# Patient Record
Sex: FEMALE | Race: WHITE | Employment: OTHER | URBAN - METROPOLITAN AREA
[De-identification: names, ages, dates, MRNs, and addresses within clinical notes are randomized per-mention and may not be internally consistent; named-entity substitution may affect disease eponyms.]

---

## 2017-06-29 ENCOUNTER — HOSPITAL ENCOUNTER (OUTPATIENT)
Dept: VASCULAR LAB | Age: 67
Discharge: HOME | End: 2017-06-29
Attending: INTERNAL MEDICINE | Admitting: INTERNAL MEDICINE

## 2017-06-29 DIAGNOSIS — R60.9 EDEMA: ICD-10-CM

## 2017-07-17 ENCOUNTER — HOSPITAL ENCOUNTER (OUTPATIENT)
Dept: MRI IMAGING | Age: 67
Discharge: HOME | End: 2017-07-17
Attending: ORTHOPAEDIC SURGERY | Admitting: ORTHOPAEDIC SURGERY

## 2017-07-17 DIAGNOSIS — M23.204 DERANGEMENT OF MEDIAL MENISCUS OF LEFT KNEE DUE TO OLD INJURY: ICD-10-CM

## 2017-07-17 DIAGNOSIS — M25.562 LEFT KNEE PAIN, UNSPECIFIED CHRONICITY: ICD-10-CM

## 2020-04-07 ENCOUNTER — HOSPITAL ENCOUNTER (OUTPATIENT)
Dept: CT IMAGING | Age: 70
Discharge: HOME | End: 2020-04-07
Payer: MEDICARE

## 2020-04-07 PROCEDURE — 71270 CT THORAX DX C-/C+: CPT

## 2020-04-07 PROCEDURE — 6360000004 HC RX CONTRAST MEDICATION: Performed by: INTERNAL MEDICINE

## 2020-04-07 RX ADMIN — IOPAMIDOL 75 ML: 755 INJECTION, SOLUTION INTRAVENOUS at 14:51

## 2021-10-21 ENCOUNTER — OFFICE VISIT (OUTPATIENT)
Dept: ORTHOPEDIC SURGERY | Age: 71
End: 2021-10-21
Payer: MEDICARE

## 2021-10-21 VITALS — BODY MASS INDEX: 49.47 KG/M2 | WEIGHT: 262 LBS | HEIGHT: 61 IN

## 2021-10-21 DIAGNOSIS — M25.562 LEFT KNEE PAIN, UNSPECIFIED CHRONICITY: Primary | ICD-10-CM

## 2021-10-21 PROCEDURE — 4004F PT TOBACCO SCREEN RCVD TLK: CPT | Performed by: ORTHOPAEDIC SURGERY

## 2021-10-21 PROCEDURE — G8400 PT W/DXA NO RESULTS DOC: HCPCS | Performed by: ORTHOPAEDIC SURGERY

## 2021-10-21 PROCEDURE — 99204 OFFICE O/P NEW MOD 45 MIN: CPT | Performed by: ORTHOPAEDIC SURGERY

## 2021-10-21 PROCEDURE — 20610 DRAIN/INJ JOINT/BURSA W/O US: CPT | Performed by: ORTHOPAEDIC SURGERY

## 2021-10-21 PROCEDURE — G8484 FLU IMMUNIZE NO ADMIN: HCPCS | Performed by: ORTHOPAEDIC SURGERY

## 2021-10-21 PROCEDURE — G8427 DOCREV CUR MEDS BY ELIG CLIN: HCPCS | Performed by: ORTHOPAEDIC SURGERY

## 2021-10-21 PROCEDURE — 4040F PNEUMOC VAC/ADMIN/RCVD: CPT | Performed by: ORTHOPAEDIC SURGERY

## 2021-10-21 PROCEDURE — G8417 CALC BMI ABV UP PARAM F/U: HCPCS | Performed by: ORTHOPAEDIC SURGERY

## 2021-10-21 PROCEDURE — 1123F ACP DISCUSS/DSCN MKR DOCD: CPT | Performed by: ORTHOPAEDIC SURGERY

## 2021-10-21 PROCEDURE — 1090F PRES/ABSN URINE INCON ASSESS: CPT | Performed by: ORTHOPAEDIC SURGERY

## 2021-10-21 PROCEDURE — 3017F COLORECTAL CA SCREEN DOC REV: CPT | Performed by: ORTHOPAEDIC SURGERY

## 2021-10-21 RX ORDER — METHYLPREDNISOLONE ACETATE 40 MG/ML
40 INJECTION, SUSPENSION INTRA-ARTICULAR; INTRALESIONAL; INTRAMUSCULAR; SOFT TISSUE ONCE
Status: COMPLETED | OUTPATIENT
Start: 2021-10-21 | End: 2021-10-21

## 2021-10-21 RX ORDER — BUPIVACAINE HYDROCHLORIDE 5 MG/ML
4 INJECTION, SOLUTION PERINEURAL ONCE
Status: COMPLETED | OUTPATIENT
Start: 2021-10-21 | End: 2021-10-21

## 2021-10-21 RX ADMIN — BUPIVACAINE HYDROCHLORIDE 20 MG: 5 INJECTION, SOLUTION PERINEURAL at 11:18

## 2021-10-21 RX ADMIN — METHYLPREDNISOLONE ACETATE 40 MG: 40 INJECTION, SUSPENSION INTRA-ARTICULAR; INTRALESIONAL; INTRAMUSCULAR; SOFT TISSUE at 11:19

## 2021-10-25 NOTE — PROGRESS NOTES
10/21/2021     Reason for visit:  Left knee pain    History of Present Illness: The patient is a 71-year-old female who presents for evaluation of her left knee. She reports pain for several months with recent worsening on 10/8/2021. At the time she was coming down the stairs when she felt a popping sensation within the knee. Ever since then she has had worsening pain. She localized the pain diffusely throughout the knee including anterior and deep. The pain is made worse with standing, walking, stairs. Occasional swelling. Medical History:  Past Medical History:   Diagnosis Date    Asthma     Back pain     DJD    Bipolar 1 disorder     Diabetes mellitus     GERD (gastroesophageal reflux disease)     Hyperlipidemia     Hypertension     Tachycardia       Past Surgical History:   Procedure Laterality Date    APPENDECTOMY      CHOLECYSTECTOMY      ENDOMETRIAL ABLATION      LEG SURGERY      LEFT TIB FIB FX, SURGERY X2      No family history on file. Social History     Socioeconomic History    Marital status:      Spouse name: Not on file    Number of children: Not on file    Years of education: Not on file    Highest education level: Not on file   Occupational History    Not on file   Tobacco Use    Smoking status: Never Smoker    Tobacco comment: 1 CIG /DAY   Substance and Sexual Activity    Alcohol use: No    Drug use: No    Sexual activity: Not on file   Other Topics Concern    Not on file   Social History Narrative    Not on file     Social Determinants of Health     Financial Resource Strain:     Difficulty of Paying Living Expenses:    Food Insecurity:     Worried About Running Out of Food in the Last Year:     [address] in the Last Year:    Transportation Needs:     Lack of Transportation (Medical):      Lack of Transportation (Non-Medical):    Physical Activity:     Days of Exercise per Week:     Minutes of Exercise per Session:    Stress:     Feeling of Stress :    Social Connections:     Frequency of Communication with Friends and Family:     Frequency of Social Gatherings with Friends and Family:     Attends Zoroastrianism Services:     Active Member of Clubs or Organizations:     Attends Club or Organization Meetings:     Marital Status:    Intimate Partner Violence:     Fear of Current or Ex-Partner:     Emotionally Abused:     Physically Abused:     Sexually Abused:       Current Outpatient Medications on File Prior to Visit   Medication Sig Dispense Refill    naproxen (NAPROSYN) 500 MG tablet Take 1 tablet by mouth 2 times daily for 20 doses. 20 tablet 0    methocarbamol (ROBAXIN) 500 MG tablet Take 500 mg by mouth 4 times daily.  lisinopril (PRINIVIL;ZESTRIL) 10 MG tablet Take 10 mg by mouth daily.  omeprazole (PRILOSEC) 20 MG capsule Take 20 mg by mouth as needed.  niacin 500 MG CR capsule Take 500 mg by mouth nightly.  simvastatin (ZOCOR) 20 MG tablet Take 20 mg by mouth nightly.  ALPRAZolam (XANAX) 1 MG tablet Take 1 mg by mouth daily.  ARIPiprazole (ABILIFY) 10 MG tablet Take 10 mg by mouth daily.  montelukast (SINGULAIR) 10 MG tablet Take 10 mg by mouth nightly.  QUEtiapine (SEROQUEL) 100 MG tablet Take 100 mg by mouth daily.  albuterol (PROVENTIL HFA;VENTOLIN HFA) 108 (90 BASE) MCG/ACT inhaler Inhale 2 puffs into the lungs every 6 hours as needed.  QUEtiapine (SEROQUEL) 200 MG tablet Take 200 mg by mouth 2 times daily.  atenolol (TENORMIN) 50 MG tablet Take 50 mg by mouth See Admin Instructions. 1/2 TAB DAILY       glipiZIDE (GLUCOTROL) 5 MG CR tablet Take 5 mg by mouth daily.  ibuprofen (IBU) 600 MG tablet Take 1 tablet by mouth every 8 hours. 20 tablet 0     No current facility-administered medications on file prior to visit.       Allergies   Allergen Reactions    Penicillins Swelling        Review of Systems:  Constitutional: Patient is adequately groomed with no evidence of malnutrition  Mental Status: The patient is oriented to time, place and person. The patient's mood and affect are appropriate. Lymphatic: The lymphatic examination bilaterally reveals all areas to be without enlargement or induration. Vascular: Examination reveals no swelling or calf tenderness. Peripheral pulses are palpable and 2+. Neurological: The patient has good coordination. There is no weakness or sensory deficit. Skin:  Head/Neck: inspection reveals no rashes, ulcerations or lesions. Trunk: inspection reveals no rashes, ulcerations or lesions. Objective:  Ht 5' 1\" (1.549 m)   Wt 262 lb (118.8 kg)   BMI 49.50 kg/m²      Physical Exam:  The patient is well-appearing and in no apparent distress  Examination of the left knee   There is a small effusion, no gross deformity or skin changes  Range of motion reveals 0 to 130  Neg lachman, negative posterior drawer, no pain or laxity with varus or valgus stress at 0 degrees and 30 degrees of flexion  + joint line tenderness  5 out of 5 strength throughout distal muscle groups  Sensation is intact to light touch throughout all distributions  There is no calf swelling or tenderness  Palpable DP pulse, brisk cap refill, 2+ symmetric reflexes    Imagin view x-rays of the left knee were obtained the office today on 10/21/2021. Previous x-rays were reviewed as well. Mild decreased patellofemoral joint space is noted. Assessment:  Left knee pain. X-ray reveals patellofemoral degenerative joint disease. Possible additional meniscus tear    Plan:  I discussed with the patient the diagnosis and treatment options. We discussed operative and nonoperative management. At this point I do recommend nonoperative management. Nonoperative treatment options include activity modification, anti-inflammatory medications, physical therapy, and injections. The patient elected proceed with cortisone injection.   Therefore injection was given to to the left knee via sterile technique. The injection consisted of 40 mg of Depo-Medrol combined with 4 mL of 0.5% Marcaine. The patient tolerated this well. We will also start physical therapy. The patient will return as needed. If she does remain symptomatic we would obtain an MRI as a neck step. Greater than 45 minutes were spent with this encounter. Time spent included evaluating the patient's chart prior to arrival.  Evaluating the patient in the office including history, physical examination, imaging reviewing, and counseling on next steps. Lastly, time was spent discussing orders with my staff as well as providing documentation in the chart. Jose Constantino MD            Orthopaedic Surgery Sports Medicine and [address] and [address] [facility]            Team Physician [facility] ([place]      Disclaimer: This note was dictated with voice recognition software. Though review and correction are routine, we apologize for any errors.

## 2023-03-14 ENCOUNTER — TELEPHONE (OUTPATIENT)
Dept: CARDIOLOGY CLINIC | Age: 73
End: 2023-03-14

## 2023-03-14 ENCOUNTER — OFFICE VISIT (OUTPATIENT)
Dept: PULMONOLOGY | Age: 73
End: 2023-03-14
Payer: MEDICARE

## 2023-03-14 VITALS
BODY MASS INDEX: 55.32 KG/M2 | HEIGHT: 61 IN | SYSTOLIC BLOOD PRESSURE: 100 MMHG | DIASTOLIC BLOOD PRESSURE: 46 MMHG | OXYGEN SATURATION: 93 % | WEIGHT: 293 LBS

## 2023-03-14 DIAGNOSIS — I10 PRIMARY HYPERTENSION: ICD-10-CM

## 2023-03-14 DIAGNOSIS — G47.33 OSA (OBSTRUCTIVE SLEEP APNEA): ICD-10-CM

## 2023-03-14 DIAGNOSIS — R06.02 SHORTNESS OF BREATH: ICD-10-CM

## 2023-03-14 DIAGNOSIS — J45.20 MILD INTERMITTENT ASTHMA WITHOUT COMPLICATION: Primary | ICD-10-CM

## 2023-03-14 DIAGNOSIS — E66.01 CLASS 3 SEVERE OBESITY DUE TO EXCESS CALORIES WITH SERIOUS COMORBIDITY AND BODY MASS INDEX (BMI) OF 50.0 TO 59.9 IN ADULT (HCC): ICD-10-CM

## 2023-03-14 LAB
ANION GAP SERPL CALCULATED.3IONS-SCNC: 11 MMOL/L (ref 3–16)
BASOPHILS # BLD: 0 K/UL (ref 0–0.2)
BASOPHILS NFR BLD: 0.6 %
BUN SERPL-MCNC: 11 MG/DL (ref 7–20)
CALCIUM SERPL-MCNC: 9.8 MG/DL (ref 8.3–10.6)
CHLORIDE SERPL-SCNC: 99 MMOL/L (ref 99–110)
CO2 SERPL-SCNC: 32 MMOL/L (ref 21–32)
CREAT SERPL-MCNC: 0.6 MG/DL (ref 0.6–1.2)
DEPRECATED RDW RBC AUTO: 14.3 % (ref 12.4–15.4)
EOSINOPHIL # BLD: 0.2 K/UL (ref 0–0.6)
EOSINOPHIL NFR BLD: 4 %
GFR SERPLBLD CREATININE-BSD FMLA CKD-EPI: >60 ML/MIN/{1.73_M2}
GLUCOSE SERPL-MCNC: 123 MG/DL (ref 70–99)
HCT VFR BLD AUTO: 35.4 % (ref 36–48)
HGB BLD-MCNC: 11.4 G/DL (ref 12–16)
LYMPHOCYTES # BLD: 0.8 K/UL (ref 1–5.1)
LYMPHOCYTES NFR BLD: 19.8 %
MCH RBC QN AUTO: 30.4 PG (ref 26–34)
MCHC RBC AUTO-ENTMCNC: 32.2 G/DL (ref 31–36)
MCV RBC AUTO: 94.5 FL (ref 80–100)
MONOCYTES # BLD: 0.3 K/UL (ref 0–1.3)
MONOCYTES NFR BLD: 8 %
NEUTROPHILS # BLD: 2.9 K/UL (ref 1.7–7.7)
NEUTROPHILS NFR BLD: 67.6 %
NT-PROBNP SERPL-MCNC: 452 PG/ML (ref 0–124)
PATH INTERP BLD-IMP: NO
PLATELET # BLD AUTO: 98 K/UL (ref 135–450)
PLATELET BLD QL SMEAR: ABNORMAL
PMV BLD AUTO: 10.3 FL (ref 5–10.5)
POTASSIUM SERPL-SCNC: 4.7 MMOL/L (ref 3.5–5.1)
RBC # BLD AUTO: 3.74 M/UL (ref 4–5.2)
SLIDE REVIEW: ABNORMAL
SODIUM SERPL-SCNC: 142 MMOL/L (ref 136–145)
WBC # BLD AUTO: 4.3 K/UL (ref 4–11)

## 2023-03-14 PROCEDURE — 1090F PRES/ABSN URINE INCON ASSESS: CPT | Performed by: INTERNAL MEDICINE

## 2023-03-14 PROCEDURE — 3017F COLORECTAL CA SCREEN DOC REV: CPT | Performed by: INTERNAL MEDICINE

## 2023-03-14 PROCEDURE — G8427 DOCREV CUR MEDS BY ELIG CLIN: HCPCS | Performed by: INTERNAL MEDICINE

## 2023-03-14 PROCEDURE — G8400 PT W/DXA NO RESULTS DOC: HCPCS | Performed by: INTERNAL MEDICINE

## 2023-03-14 PROCEDURE — 1036F TOBACCO NON-USER: CPT | Performed by: INTERNAL MEDICINE

## 2023-03-14 PROCEDURE — G8419 CALC BMI OUT NRM PARAM NOF/U: HCPCS | Performed by: INTERNAL MEDICINE

## 2023-03-14 PROCEDURE — 3078F DIAST BP <80 MM HG: CPT | Performed by: INTERNAL MEDICINE

## 2023-03-14 PROCEDURE — 94664 DEMO&/EVAL PT USE INHALER: CPT | Performed by: INTERNAL MEDICINE

## 2023-03-14 PROCEDURE — 99205 OFFICE O/P NEW HI 60 MIN: CPT | Performed by: INTERNAL MEDICINE

## 2023-03-14 PROCEDURE — 3074F SYST BP LT 130 MM HG: CPT | Performed by: INTERNAL MEDICINE

## 2023-03-14 PROCEDURE — 1123F ACP DISCUSS/DSCN MKR DOCD: CPT | Performed by: INTERNAL MEDICINE

## 2023-03-14 PROCEDURE — G8484 FLU IMMUNIZE NO ADMIN: HCPCS | Performed by: INTERNAL MEDICINE

## 2023-03-14 RX ORDER — UBIDECARENONE 75 MG
50 CAPSULE ORAL DAILY
COMMUNITY

## 2023-03-14 RX ORDER — DULAGLUTIDE 1.5 MG/.5ML
INJECTION, SOLUTION SUBCUTANEOUS
COMMUNITY
Start: 2023-01-13

## 2023-03-14 RX ORDER — INSULIN DETEMIR 100 [IU]/ML
INJECTION, SOLUTION SUBCUTANEOUS
COMMUNITY
Start: 2023-02-09

## 2023-03-14 RX ORDER — FERROUS SULFATE 325(65) MG
325 TABLET ORAL
COMMUNITY

## 2023-03-14 NOTE — TELEPHONE ENCOUNTER
Patient was referred by Keith Salamanca  to the [facility] location with Dr. Isiah Lindquist . Patient has been scheduled for 4/28/23 at 9 am (am/pm). Patient verbalized understanding of appointment instructions. Call complete.

## 2023-03-14 NOTE — PROGRESS NOTES
PULMONARY OFFICE NEW PATIENT VISIT    CONSULTING PHYSICIAN:  Adam Cobos MD     REASON FOR VISIT:   Chief Complaint   Patient presents with    New Patient     Ref: Dr. Golden Sapp of Breath     Uses 2 L/ O2        DATE OF VISIT: 3/14/2023    HISTORY OF PRESENT ILLNESS: 81y.o. year old female comes into the pulmonary office for the first time. Patient reports that for the last 2 years she has been having progressively worsening shortness of breath mostly with exertion. For the last 3 weeks this has been worse. She gets short of breath with minimal exertion does occasionally wheeze. Denies any significant cough. Intermittently does have dry cough. No discolored expectoration. She has been on oxygen for the last 3 to 4 weeks. Uses it at 2 L/min all the time. Even with oxygen supplementation on a 2 L/min with exertion she does desaturate. Quickly recovers on resting. Off of oxygen her oxygen levels dropped down as low as in mid 70s. She has been gaining weight and has bilateral pedal edema. Also reports orthopnea. At nighttime her sleep remains disturbed with shallow breathing, snoring. Feels exhausted and tired throughout the day. Has been diagnosed with asthma since her childhood. Also smoked cigarettes for 20 years before quitting 30 years ago. Used to smoke about 1 pack/day. Also was exposed to secondhand smoking through her family. She was given Spiriva in the past but has not used it recently. Only uses albuterol as needed. Denies any fevers or night sweats. Does have palpitations, presyncope. She does take Lasix 20 mg p.o. daily but this has been ineffective in reducing her weight. Also has a history of breast cancer and did undergo chemotherapy in the past.  Suffered with subsegmental PE in 2018 and was on Xarelto for 2 years after that. Recent testing for lower extremities as well as CT angiogram have not revealed any recurrence of PEs.       REVIEW OF SYSTEMS: CONSTITUTIONAL SYMPTOMS: The patient denies fever and night sweats. Reports of daytime fatigue and recent weight gain. HEENT: No vision changes. No tinnitus, Denies sinus pain. No hoarseness, or dysphagia. NECK: Patient denies swelling in the neck. CARDIOVASCULAR: Denies chest pain, palpitation, syncope. RESPIRATORY: As per HPI. GASTROINTESTINAL: Denies nausea, abdominal pain or change in bowel function. GENITOURINARY: Denies obstructive symptoms. No history of incontinence. BREASTS: No masses or lumps in the breasts. SKIN: No rashes or itching. MUSCULOSKELETAL: Denies weakness or bone pain. Does have bilateral pedal edema. NEUROLOGICAL: No headaches or seizures. PSYCHIATRIC: Denies mood swings or depression. ENDOCRINE: Denies heat or cold intolerance or excessive thirst.  HEMATOLOGIC/LYMPHATIC: Denies easy bruising or lymph node swelling. ALLERGIC/IMMUNOLOGIC: No environmental allergies. PAST MEDICAL HISTORY:   Past Medical History:   Diagnosis Date    Asthma     Back pain     DJD    Bipolar 1 disorder (HCC)     Diabetes mellitus (HCC)     GERD (gastroesophageal reflux disease)     Hyperlipidemia     Hypertension     Tachycardia        PAST SURGICAL HISTORY:   Past Surgical History:   Procedure Laterality Date    APPENDECTOMY      CHOLECYSTECTOMY      ENDOMETRIAL ABLATION      LEG SURGERY      LEFT TIB FIB FX, SURGERY X2       SOCIAL HISTORY:   Social History     Tobacco Use    Smoking status: Never    Smokeless tobacco: Never    Tobacco comments:     1 CIG /DAY   Substance Use Topics    Alcohol use: No    Drug use: No       FAMILY HISTORY: No family history on file.     MEDICATIONS:     Current Outpatient Medications on File Prior to Visit   Medication Sig Dispense Refill    metFORMIN (GLUCOPHAGE) 500 MG tablet Take 1,000 mg by mouth daily (with breakfast)      ferrous sulfate (IRON 325) 325 (65 Fe) MG tablet Take 325 mg by mouth daily (with breakfast)      vitamin B-12 (CYANOCOBALAMIN) 100 MCG tablet Take 50 mcg by mouth daily      Furosemide (LASIX PO) Take by mouth daily      TRULICITY 1.5 DZ/5.9GK SC injection       LEVEMIR FLEXTOUCH 100 UNIT/ML injection pen       lisinopril (PRINIVIL;ZESTRIL) 10 MG tablet Take 30 mg by mouth daily      niacin 500 MG CR capsule Take 500 mg by mouth nightly. simvastatin (ZOCOR) 20 MG tablet Take 20 mg by mouth nightly. ARIPiprazole (ABILIFY) 10 MG tablet Take 10 mg by mouth daily. montelukast (SINGULAIR) 10 MG tablet Take 10 mg by mouth nightly. QUEtiapine (SEROQUEL) 100 MG tablet Take 100 mg by mouth daily. albuterol (PROVENTIL HFA;VENTOLIN HFA) 108 (90 BASE) MCG/ACT inhaler Inhale 2 puffs into the lungs every 6 hours as needed. QUEtiapine (SEROQUEL) 200 MG tablet Take 200 mg by mouth at bedtime      atenolol (TENORMIN) 50 MG tablet Take 50 mg by mouth See Admin Instructions. 1/2 TAB DAILY       glipiZIDE (GLUCOTROL) 5 MG CR tablet Take 5 mg by mouth daily. naproxen (NAPROSYN) 500 MG tablet Take 1 tablet by mouth 2 times daily for 20 doses. 20 tablet 0     No current facility-administered medications on file prior to visit. ALLERGIES:   Allergies as of 03/14/2023 - Fully Reviewed 03/14/2023   Allergen Reaction Noted    Penicillins Swelling 12/01/2012      OBJECTIVE:   height is 5' 1\" (1.549 m) and weight is 305 lb (138.3 kg) (abnormal). Her blood pressure is 100/46 (abnormal). Her oxygen saturation is 93%. PHYSICAL EXAM:    CONSTITUTIONAL: She is a 81y.o.-year-old who appears her stated age. She is alert and oriented x 3 and in no acute distress. HEENT: PERRLA, EOMI. No scleral icterus. No thrush, atraumatic, normocephalic. NECK: Supple, without cervical or supraclavicular lymphadenopathy:  CARDIOVASCULAR: S1 S2 RRR. Without murmer  RESPIRATORY & CHEST: Bibasilar decreased breath sounds. No wheezing or crackles heard.   GASTROINTESTINAL & ABDOMEN: Soft, nontender, positive bowel sounds in all quadrants, non-distended, without hepatosplenomegaly. GENITOURINARY: Deferred. MUSCULOSKELETAL: No tenderness to palpation of the axial skeleton. There is no clubbing. No cyanosis. 1+ pitting edema in bilateral lower extremities. SKIN OF BODY: No rash or jaundice. PSYCHIATRIC EVALUATION: Normal affect. Patient answers questions appropriately. HEMATOLOGIC/LYMPHATIC/ IMMUNOLOGIC: No palpable lymphadenopathy. NEUROLOGIC: Alert and oriented x 3. Groslly non-focal. Motor strength is 5+/5 in all muscle groups. The patient has a normal sensorium globally. LABS:    No flowsheet data found. IMAGING:    CT angiogram of the chest done on 12/28/2022 at [facility]:   PULMONARY ARTERIES:  Enhance normally without filling defect or other evidence of pulmonary embolism   LUNGS/AIRWAYS:  Mild basilar atelectatic changes left greater than right   PLEURA: Unremarkable. No pleural effusion or pneumothorax   MEDIASTINUM/SONU:  Unremarkable   HEART/PERICARDIUM:  Unremarkable   VESSELS:  Unremarkable. No aneurysm   CHEST WALL/LOWER NECK:  Unremarkable   UPPER ABDOMEN:  Unremarkable   BONES:  Chronic degenerative changes in the spine   OTHER:  None       IMPRESSION       No significant abnormality. No evidence of pulmonary embolus      Pulmonary Functions Testing Results:      Transthoracic echocardiogram with Doppler done on 11/15/2022 at [facility]    Summary:   Left ventricular function is normal.   Overall left ventricular ejection fraction is estimated to be 55-60%. Left ventricular wall motion is normal.   Left atrial size is at the upper limits of normal.   The Aortic Valve is mildly calcified. No hemodynamically significant valvular aortic stenosis. Borderline dilated ascending aorta. Right ventricular systolic pressure is normal at <35 mmHg. The aortic valve is trileaflet. Left atrial filling pressure is elevated based on E/E >15.    A contrast agent was used to demonstrate all left ventricular wall segments. IMPRESSION AND RECOMMENDATIONS:     1. Mild intermittent asthma without complication  -Patient has been diagnosed to have asthma since her childhood.  -She also smoked for 20 years in the past.  -She may have developed a COPD-asthma overlap.  -I will get a complete PFT in order to assess the level of obstructive airway disease present currently. -In the meanwhile I will represcribe her with Spiriva Respimat 2.5 mcg 2 puff daily.  -Patient was instructed in the use of Respimat demonstration Device. Patient understood instructions and is aware to call the office if they have any problems or questions. Time spent was 5 mins.  -She will continue to use albuterol as needed. - Full PFT Study With Bronchodilator; Future  -I will also get a daytime ABG done in order to rule out hypercapnia. 2. Class 3 severe obesity due to excess calories with serious comorbidity and body mass index (BMI) of 50.0 to 59.9 in [facility])  -I discussed weight loss as a treatment strategy in achieving the healthcare goals. If patient loses even 10 to 15% of current body weight, it will be beneficial in improving the overall health. 3. Primary hypertension  -Currently on atenolol, lisinopril  -Refractory hypertension could be leading to heart failure with preserved ejection fraction.  -Uncontrolled hypertension could be a sign of untreated sleep apnea. 4. ALEXANDRU (obstructive sleep apnea)  -Patient does shallow breathing at nighttime and does have daytime sleepiness.  -I also suspect that she may have obesity hypoventilation syndrome.  -I will get a split-night sleep study done in order to investigate this further.  - Sleep Study with PAP Titration; Future    5. Shortness of breath  -Patient has dyspnea on exertion, fluid retention, palpitation and presyncope. -She has a history of PE in the past.  Recent echocardiogram has not shown elevated right ventricle systolic pressure. (Echo windows were poor)  -I wonder if she has developed heart failure with preserved action fraction or chronic thromboembolic pulmonary hypertension? .  -She may need a repeat echocardiogram versus right heart catheterization  -I will send her to Dr. Tabby Rico (cardiology) for evaluation  - Full PFT Study With Bronchodilator; Future  - Brain natriuretic peptide; Future  - CBC with Auto Differential; Future  - Basic Metabolic Panel; Future  - Ambulatory referral to Cardiology  - BLOOD GAS, ARTERIAL; Future      Thank you Dr. Devin Gilman for letting me take care of this very pleasant patient. I spent 60 minutes of face to face time with the patient with greater than 50% spent on counseling about  the clinical diagnosis, treatment plan and prognosis. Return in about 1 month (around 4/14/2023). Torey Crooks MD  Pulmonary Critical Care and Sleep Medicine  3/14/2023, 12:16 PM    This note was completed using dragon medical speech recognition software. Grammatical errors, random word insertions, pronoun errors and incomplete sentences are occasional consequences of this technology due to software limitations. If there are questions or concerns about the content of this note of information contained within the body of this dictation they should be addressed with the provider for clarification.

## 2023-03-14 NOTE — TELEPHONE ENCOUNTER
Referral received from Dr Anabel Myles for patient to see Dr Samantha Meza for SOB and CHF. I called and LMOM to return the call to schedule an appt.

## 2023-03-15 ENCOUNTER — TELEPHONE (OUTPATIENT)
Dept: SLEEP CENTER | Age: 73
End: 2023-03-15

## 2023-03-15 NOTE — TELEPHONE ENCOUNTER
Called to schedule split night per Jeffrey Kohli with pt -she wants me to call back in May   Is not ready to schedule this appt just yet. Has a lot of other appts -wanted to hold off.      [employer] medicare insurance

## 2023-03-17 ENCOUNTER — HOSPITAL ENCOUNTER (OUTPATIENT)
Dept: PULMONOLOGY | Age: 73
Discharge: HOME | End: 2023-03-17
Payer: MEDICARE

## 2023-03-17 VITALS — OXYGEN SATURATION: 93 % | RESPIRATION RATE: 16 BRPM | HEART RATE: 75 BPM

## 2023-03-17 DIAGNOSIS — J45.20 MILD INTERMITTENT ASTHMA WITHOUT COMPLICATION: ICD-10-CM

## 2023-03-17 DIAGNOSIS — R06.02 SHORTNESS OF BREATH: ICD-10-CM

## 2023-03-17 LAB
DLCO %PRED: 35 %
DLCO PRED: NORMAL
DLCO/VA %PRED: NORMAL
DLCO/VA PRED: NORMAL
DLCO/VA: NORMAL
DLCO: NORMAL
EXPIRATORY TIME-POST: NORMAL
EXPIRATORY TIME: NORMAL
FEF 25-75% %CHNG: NORMAL
FEF 25-75% %PRED-POST: NORMAL
FEF 25-75% %PRED-PRE: NORMAL
FEF 25-75% PRED: NORMAL
FEF 25-75%-POST: NORMAL
FEF 25-75%-PRE: NORMAL
FEV1 %PRED-POST: 50 %
FEV1 %PRED-PRE: 49 %
FEV1 PRED: NORMAL
FEV1-POST: NORMAL
FEV1-PRE: NORMAL
FEV1/FVC %PRED-POST: NORMAL
FEV1/FVC %PRED-PRE: NORMAL
FEV1/FVC PRED: NORMAL
FEV1/FVC-POST: 103 %
FEV1/FVC-PRE: 102 %
FVC %PRED-POST: NORMAL
FVC %PRED-PRE: NORMAL
FVC PRED: NORMAL
FVC-POST: NORMAL
FVC-PRE: NORMAL
GAW %PRED: NORMAL
GAW PRED: NORMAL
GAW: NORMAL
IC %PRED: NORMAL
IC PRED: NORMAL
IC: NORMAL
MEP: NORMAL
MIP: NORMAL
MVV %PRED-PRE: NORMAL
MVV PRED: NORMAL
MVV-PRE: NORMAL
PEF %PRED-POST: NORMAL
PEF %PRED-PRE: NORMAL
PEF PRED: NORMAL
PEF%CHNG: NORMAL
PEF-POST: NORMAL
PEF-PRE: NORMAL
RAW %PRED: NORMAL
RAW PRED: NORMAL
RAW: NORMAL
RV %PRED: NORMAL
RV PRED: NORMAL
RV: NORMAL
SVC %PRED: NORMAL
SVC PRED: NORMAL
SVC: NORMAL
TLC %PRED: 66 %
TLC PRED: NORMAL
TLC: NORMAL
VA %PRED: NORMAL
VA PRED: NORMAL
VA: NORMAL
VTG %PRED: NORMAL
VTG PRED: NORMAL
VTG: NORMAL

## 2023-03-17 PROCEDURE — 94726 PLETHYSMOGRAPHY LUNG VOLUMES: CPT

## 2023-03-17 PROCEDURE — 94729 DIFFUSING CAPACITY: CPT

## 2023-03-17 PROCEDURE — 94760 N-INVAS EAR/PLS OXIMETRY 1: CPT

## 2023-03-17 PROCEDURE — 6370000000 HC RX 637 (ALT 250 FOR IP): Performed by: INTERNAL MEDICINE

## 2023-03-17 PROCEDURE — 94060 EVALUATION OF WHEEZING: CPT

## 2023-03-17 RX ORDER — ALBUTEROL SULFATE 90 UG/1
4 AEROSOL, METERED RESPIRATORY (INHALATION) ONCE
Status: CANCELLED | OUTPATIENT
Start: 2023-03-17

## 2023-03-17 RX ORDER — ALBUTEROL SULFATE 90 UG/1
4 AEROSOL, METERED RESPIRATORY (INHALATION) ONCE
Status: COMPLETED | OUTPATIENT
Start: 2023-03-17 | End: 2023-03-17

## 2023-03-17 RX ADMIN — Medication 4 PUFF: at 14:17

## 2023-03-17 ASSESSMENT — PULMONARY FUNCTION TESTS
FEV1/FVC_POST: 103
FEV1_PERCENT_PREDICTED_PRE: 49
FEV1/FVC_PRE: 102
FEV1_PERCENT_PREDICTED_POST: 50

## 2023-03-20 ENCOUNTER — HOSPITAL ENCOUNTER (INPATIENT)
Age: 73
LOS: 7 days | Discharge: HOME | End: 2023-03-27
Attending: EMERGENCY MEDICINE | Admitting: INTERNAL MEDICINE
Payer: MEDICARE

## 2023-03-20 ENCOUNTER — APPOINTMENT (OUTPATIENT)
Dept: CT IMAGING | Age: 73
End: 2023-03-20
Payer: MEDICARE

## 2023-03-20 ENCOUNTER — APPOINTMENT (OUTPATIENT)
Dept: GENERAL RADIOLOGY | Age: 73
End: 2023-03-20
Payer: MEDICARE

## 2023-03-20 DIAGNOSIS — I50.9 ACUTE ON CHRONIC CONGESTIVE HEART FAILURE, UNSPECIFIED HEART FAILURE TYPE (HCC): Primary | ICD-10-CM

## 2023-03-20 PROBLEM — I50.43 CHF (CONGESTIVE HEART FAILURE), NYHA CLASS I, ACUTE ON CHRONIC, COMBINED (HCC): Status: ACTIVE | Noted: 2023-03-20

## 2023-03-20 LAB
ALBUMIN SERPL-MCNC: 3.9 G/DL (ref 3.4–5)
ALBUMIN/GLOB SERPL: 1.3 {RATIO} (ref 1.1–2.2)
ALP SERPL-CCNC: 72 U/L (ref 40–129)
ALT SERPL-CCNC: 11 U/L (ref 10–40)
ANION GAP SERPL CALCULATED.3IONS-SCNC: 10 MMOL/L (ref 3–16)
AST SERPL-CCNC: 19 U/L (ref 15–37)
BASOPHILS # BLD: 0 K/UL (ref 0–0.2)
BASOPHILS NFR BLD: 0.5 %
BILIRUB SERPL-MCNC: 0.3 MG/DL (ref 0–1)
BUN SERPL-MCNC: 11 MG/DL (ref 7–20)
CALCIUM SERPL-MCNC: 9.3 MG/DL (ref 8.3–10.6)
CHLORIDE SERPL-SCNC: 97 MMOL/L (ref 99–110)
CO2 SERPL-SCNC: 35 MMOL/L (ref 21–32)
CREAT SERPL-MCNC: 0.5 MG/DL (ref 0.6–1.2)
D DIMER: 0.8 UG/ML FEU (ref 0–0.6)
DEPRECATED RDW RBC AUTO: 14.5 % (ref 12.4–15.4)
EKG ATRIAL RATE: 80 BPM
EKG DIAGNOSIS: NORMAL
EKG P AXIS: 44 DEGREES
EKG P-R INTERVAL: 132 MS
EKG Q-T INTERVAL: 354 MS
EKG QRS DURATION: 76 MS
EKG QTC CALCULATION (BAZETT): 408 MS
EKG R AXIS: 3 DEGREES
EKG T AXIS: 58 DEGREES
EKG VENTRICULAR RATE: 80 BPM
EOSINOPHIL # BLD: 0.1 K/UL (ref 0–0.6)
EOSINOPHIL NFR BLD: 4.2 %
FOLATE SERPL-MCNC: 14.91 NG/ML (ref 4.78–24.2)
GFR SERPLBLD CREATININE-BSD FMLA CKD-EPI: >60 ML/MIN/{1.73_M2}
GLUCOSE BLD-MCNC: 135 MG/DL (ref 70–99)
GLUCOSE BLD-MCNC: 135 MG/DL (ref 70–99)
GLUCOSE BLD-MCNC: 140 MG/DL (ref 70–99)
GLUCOSE SERPL-MCNC: 117 MG/DL (ref 70–99)
HCT VFR BLD AUTO: 32.8 % (ref 36–48)
HGB BLD-MCNC: 10.2 G/DL (ref 12–16)
IRON SATN MFR SERPL: 11 % (ref 15–50)
IRON SERPL-MCNC: 42 UG/DL (ref 37–145)
LYMPHOCYTES # BLD: 0.8 K/UL (ref 1–5.1)
LYMPHOCYTES NFR BLD: 32.1 %
MAGNESIUM SERPL-MCNC: 1.8 MG/DL (ref 1.8–2.4)
MCH RBC QN AUTO: 29.7 PG (ref 26–34)
MCHC RBC AUTO-ENTMCNC: 31.2 G/DL (ref 31–36)
MCV RBC AUTO: 95.2 FL (ref 80–100)
MONOCYTES # BLD: 0.2 K/UL (ref 0–1.3)
MONOCYTES NFR BLD: 9.3 %
NEUTROPHILS # BLD: 1.3 K/UL (ref 1.7–7.7)
NEUTROPHILS NFR BLD: 53.9 %
NT-PROBNP SERPL-MCNC: 416 PG/ML (ref 0–124)
PERFORMED ON: ABNORMAL
PLATELET # BLD AUTO: 86 K/UL (ref 135–450)
PMV BLD AUTO: 9.1 FL (ref 5–10.5)
POTASSIUM SERPL-SCNC: 3.9 MMOL/L (ref 3.5–5.1)
PROT SERPL-MCNC: 7 G/DL (ref 6.4–8.2)
RBC # BLD AUTO: 3.44 M/UL (ref 4–5.2)
SODIUM SERPL-SCNC: 142 MMOL/L (ref 136–145)
TIBC SERPL-MCNC: 367 UG/DL (ref 260–445)
TROPONIN T SERPL-MCNC: <0.01 NG/ML
TSH SERPL DL<=0.005 MIU/L-ACNC: 2.29 UIU/ML (ref 0.27–4.2)
VIT B12 SERPL-MCNC: 539 PG/ML (ref 211–911)
WBC # BLD AUTO: 2.4 K/UL (ref 4–11)

## 2023-03-20 PROCEDURE — 2700000000 HC OXYGEN THERAPY PER DAY

## 2023-03-20 PROCEDURE — 83735 ASSAY OF MAGNESIUM: CPT

## 2023-03-20 PROCEDURE — 96374 THER/PROPH/DIAG INJ IV PUSH: CPT

## 2023-03-20 PROCEDURE — 99285 EMERGENCY DEPT VISIT HI MDM: CPT

## 2023-03-20 PROCEDURE — 82746 ASSAY OF FOLIC ACID SERUM: CPT

## 2023-03-20 PROCEDURE — 6360000002 HC RX W HCPCS: Performed by: INTERNAL MEDICINE

## 2023-03-20 PROCEDURE — 84443 ASSAY THYROID STIM HORMONE: CPT

## 2023-03-20 PROCEDURE — 71260 CT THORAX DX C+: CPT | Performed by: INTERNAL MEDICINE

## 2023-03-20 PROCEDURE — 94760 N-INVAS EAR/PLS OXIMETRY 1: CPT

## 2023-03-20 PROCEDURE — 80053 COMPREHEN METABOLIC PANEL: CPT

## 2023-03-20 PROCEDURE — 94640 AIRWAY INHALATION TREATMENT: CPT

## 2023-03-20 PROCEDURE — 85025 COMPLETE CBC W/AUTO DIFF WBC: CPT

## 2023-03-20 PROCEDURE — 82607 VITAMIN B-12: CPT

## 2023-03-20 PROCEDURE — 93005 ELECTROCARDIOGRAM TRACING: CPT | Performed by: EMERGENCY MEDICINE

## 2023-03-20 PROCEDURE — 83880 ASSAY OF NATRIURETIC PEPTIDE: CPT

## 2023-03-20 PROCEDURE — 6370000000 HC RX 637 (ALT 250 FOR IP): Performed by: INTERNAL MEDICINE

## 2023-03-20 PROCEDURE — 84484 ASSAY OF TROPONIN QUANT: CPT

## 2023-03-20 PROCEDURE — 6360000002 HC RX W HCPCS: Performed by: EMERGENCY MEDICINE

## 2023-03-20 PROCEDURE — 2580000003 HC RX 258: Performed by: INTERNAL MEDICINE

## 2023-03-20 PROCEDURE — 36415 COLL VENOUS BLD VENIPUNCTURE: CPT

## 2023-03-20 PROCEDURE — 1200000000 HC SEMI PRIVATE

## 2023-03-20 PROCEDURE — 93010 ELECTROCARDIOGRAM REPORT: CPT | Performed by: INTERNAL MEDICINE

## 2023-03-20 PROCEDURE — 0202U NFCT DS 22 TRGT SARS-COV-2: CPT

## 2023-03-20 PROCEDURE — 85379 FIBRIN DEGRADATION QUANT: CPT

## 2023-03-20 PROCEDURE — 83540 ASSAY OF IRON: CPT

## 2023-03-20 PROCEDURE — 83036 HEMOGLOBIN GLYCOSYLATED A1C: CPT

## 2023-03-20 PROCEDURE — 6360000004 HC RX CONTRAST MEDICATION: Performed by: INTERNAL MEDICINE

## 2023-03-20 PROCEDURE — 83550 IRON BINDING TEST: CPT

## 2023-03-20 PROCEDURE — 71045 X-RAY EXAM CHEST 1 VIEW: CPT

## 2023-03-20 RX ORDER — INSULIN GLARGINE 100 [IU]/ML
10 INJECTION, SOLUTION SUBCUTANEOUS NIGHTLY
Status: DISCONTINUED | OUTPATIENT
Start: 2023-03-20 | End: 2023-03-27 | Stop reason: HOSPADM

## 2023-03-20 RX ORDER — POLYETHYLENE GLYCOL 3350 17 G/17G
17 POWDER, FOR SOLUTION ORAL DAILY PRN
Status: DISCONTINUED | OUTPATIENT
Start: 2023-03-20 | End: 2023-03-27 | Stop reason: HOSPADM

## 2023-03-20 RX ORDER — SODIUM CHLORIDE 0.9 % (FLUSH) 0.9 %
5-40 SYRINGE (ML) INJECTION EVERY 12 HOURS SCHEDULED
Status: DISCONTINUED | OUTPATIENT
Start: 2023-03-20 | End: 2023-03-27 | Stop reason: HOSPADM

## 2023-03-20 RX ORDER — FERROUS SULFATE 325(65) MG
325 TABLET ORAL
Status: DISCONTINUED | OUTPATIENT
Start: 2023-03-21 | End: 2023-03-27 | Stop reason: HOSPADM

## 2023-03-20 RX ORDER — FUROSEMIDE 10 MG/ML
40 INJECTION INTRAMUSCULAR; INTRAVENOUS 2 TIMES DAILY
Status: DISCONTINUED | OUTPATIENT
Start: 2023-03-20 | End: 2023-03-20

## 2023-03-20 RX ORDER — INSULIN LISPRO 100 [IU]/ML
0-4 INJECTION, SOLUTION INTRAVENOUS; SUBCUTANEOUS
Status: DISCONTINUED | OUTPATIENT
Start: 2023-03-20 | End: 2023-03-27 | Stop reason: HOSPADM

## 2023-03-20 RX ORDER — SODIUM CHLORIDE 0.9 % (FLUSH) 0.9 %
5-40 SYRINGE (ML) INJECTION PRN
Status: DISCONTINUED | OUTPATIENT
Start: 2023-03-20 | End: 2023-03-27 | Stop reason: HOSPADM

## 2023-03-20 RX ORDER — SODIUM CHLORIDE 9 MG/ML
INJECTION, SOLUTION INTRAVENOUS PRN
Status: DISCONTINUED | OUTPATIENT
Start: 2023-03-20 | End: 2023-03-27 | Stop reason: HOSPADM

## 2023-03-20 RX ORDER — ACETAMINOPHEN 650 MG/1
650 SUPPOSITORY RECTAL EVERY 6 HOURS PRN
Status: DISCONTINUED | OUTPATIENT
Start: 2023-03-20 | End: 2023-03-27 | Stop reason: HOSPADM

## 2023-03-20 RX ORDER — QUETIAPINE FUMARATE 200 MG/1
200 TABLET, FILM COATED ORAL NIGHTLY
Status: DISCONTINUED | OUTPATIENT
Start: 2023-03-20 | End: 2023-03-27 | Stop reason: HOSPADM

## 2023-03-20 RX ORDER — ENOXAPARIN SODIUM 100 MG/ML
30 INJECTION SUBCUTANEOUS 2 TIMES DAILY
Status: DISCONTINUED | OUTPATIENT
Start: 2023-03-20 | End: 2023-03-27 | Stop reason: HOSPADM

## 2023-03-20 RX ORDER — DEXTROSE MONOHYDRATE 100 MG/ML
INJECTION, SOLUTION INTRAVENOUS CONTINUOUS PRN
Status: DISCONTINUED | OUTPATIENT
Start: 2023-03-20 | End: 2023-03-27 | Stop reason: HOSPADM

## 2023-03-20 RX ORDER — ONDANSETRON 2 MG/ML
4 INJECTION INTRAMUSCULAR; INTRAVENOUS EVERY 6 HOURS PRN
Status: DISCONTINUED | OUTPATIENT
Start: 2023-03-20 | End: 2023-03-27 | Stop reason: HOSPADM

## 2023-03-20 RX ORDER — ATORVASTATIN CALCIUM 10 MG/1
10 TABLET, FILM COATED ORAL NIGHTLY
Status: DISCONTINUED | OUTPATIENT
Start: 2023-03-20 | End: 2023-03-27 | Stop reason: HOSPADM

## 2023-03-20 RX ORDER — ACETAMINOPHEN 325 MG/1
650 TABLET ORAL EVERY 6 HOURS PRN
Status: DISCONTINUED | OUTPATIENT
Start: 2023-03-20 | End: 2023-03-27 | Stop reason: HOSPADM

## 2023-03-20 RX ORDER — INSULIN LISPRO 100 [IU]/ML
0-4 INJECTION, SOLUTION INTRAVENOUS; SUBCUTANEOUS NIGHTLY
Status: DISCONTINUED | OUTPATIENT
Start: 2023-03-20 | End: 2023-03-27 | Stop reason: HOSPADM

## 2023-03-20 RX ORDER — FUROSEMIDE 10 MG/ML
20 INJECTION INTRAMUSCULAR; INTRAVENOUS ONCE
Status: COMPLETED | OUTPATIENT
Start: 2023-03-20 | End: 2023-03-20

## 2023-03-20 RX ORDER — MAGNESIUM SULFATE IN WATER 40 MG/ML
2000 INJECTION, SOLUTION INTRAVENOUS PRN
Status: DISCONTINUED | OUTPATIENT
Start: 2023-03-20 | End: 2023-03-27 | Stop reason: HOSPADM

## 2023-03-20 RX ORDER — QUETIAPINE FUMARATE 100 MG/1
100 TABLET, FILM COATED ORAL DAILY
Status: DISCONTINUED | OUTPATIENT
Start: 2023-03-20 | End: 2023-03-27 | Stop reason: HOSPADM

## 2023-03-20 RX ORDER — ATENOLOL 25 MG/1
25 TABLET ORAL DAILY
Status: DISCONTINUED | OUTPATIENT
Start: 2023-03-20 | End: 2023-03-20

## 2023-03-20 RX ORDER — ALBUTEROL SULFATE 90 UG/1
2 AEROSOL, METERED RESPIRATORY (INHALATION) EVERY 6 HOURS PRN
Status: DISCONTINUED | OUTPATIENT
Start: 2023-03-20 | End: 2023-03-27 | Stop reason: HOSPADM

## 2023-03-20 RX ORDER — POTASSIUM CHLORIDE 7.45 MG/ML
10 INJECTION INTRAVENOUS PRN
Status: DISCONTINUED | OUTPATIENT
Start: 2023-03-20 | End: 2023-03-27 | Stop reason: HOSPADM

## 2023-03-20 RX ORDER — POTASSIUM CHLORIDE 20 MEQ/1
40 TABLET, EXTENDED RELEASE ORAL PRN
Status: DISCONTINUED | OUTPATIENT
Start: 2023-03-20 | End: 2023-03-27 | Stop reason: HOSPADM

## 2023-03-20 RX ORDER — MONTELUKAST SODIUM 10 MG/1
10 TABLET ORAL NIGHTLY
Status: DISCONTINUED | OUTPATIENT
Start: 2023-03-20 | End: 2023-03-27 | Stop reason: HOSPADM

## 2023-03-20 RX ORDER — ONDANSETRON 4 MG/1
4 TABLET, ORALLY DISINTEGRATING ORAL EVERY 8 HOURS PRN
Status: DISCONTINUED | OUTPATIENT
Start: 2023-03-20 | End: 2023-03-27 | Stop reason: HOSPADM

## 2023-03-20 RX ORDER — IPRATROPIUM BROMIDE AND ALBUTEROL SULFATE 2.5; .5 MG/3ML; MG/3ML
1 SOLUTION RESPIRATORY (INHALATION) EVERY 4 HOURS PRN
Status: DISCONTINUED | OUTPATIENT
Start: 2023-03-20 | End: 2023-03-27 | Stop reason: HOSPADM

## 2023-03-20 RX ORDER — ARIPIPRAZOLE 5 MG/1
10 TABLET ORAL DAILY
Status: DISCONTINUED | OUTPATIENT
Start: 2023-03-20 | End: 2023-03-27 | Stop reason: HOSPADM

## 2023-03-20 RX ORDER — SIMVASTATIN 10 MG
20 TABLET ORAL NIGHTLY
Status: DISCONTINUED | OUTPATIENT
Start: 2023-03-20 | End: 2023-03-20 | Stop reason: CLARIF

## 2023-03-20 RX ADMIN — QUETIAPINE FUMARATE 200 MG: 200 TABLET ORAL at 20:35

## 2023-03-20 RX ADMIN — MONTELUKAST SODIUM 10 MG: 10 TABLET, FILM COATED ORAL at 20:34

## 2023-03-20 RX ADMIN — Medication 10 ML: at 11:56

## 2023-03-20 RX ADMIN — INSULIN GLARGINE 10 UNITS: 100 INJECTION, SOLUTION SUBCUTANEOUS at 20:35

## 2023-03-20 RX ADMIN — ENOXAPARIN SODIUM 30 MG: 100 INJECTION SUBCUTANEOUS at 20:35

## 2023-03-20 RX ADMIN — QUETIAPINE FUMARATE 100 MG: 100 TABLET ORAL at 16:52

## 2023-03-20 RX ADMIN — LISINOPRIL 30 MG: 20 TABLET ORAL at 11:25

## 2023-03-20 RX ADMIN — Medication 10 ML: at 20:39

## 2023-03-20 RX ADMIN — ATENOLOL 25 MG: 25 TABLET ORAL at 11:21

## 2023-03-20 RX ADMIN — ENOXAPARIN SODIUM 30 MG: 100 INJECTION SUBCUTANEOUS at 11:21

## 2023-03-20 RX ADMIN — ATORVASTATIN CALCIUM 10 MG: 10 TABLET, FILM COATED ORAL at 20:34

## 2023-03-20 RX ADMIN — IPRATROPIUM BROMIDE AND ALBUTEROL SULFATE 1 AMPULE: .5; 2.5 SOLUTION RESPIRATORY (INHALATION) at 15:04

## 2023-03-20 RX ADMIN — FUROSEMIDE 20 MG: 10 INJECTION, SOLUTION INTRAMUSCULAR; INTRAVENOUS at 04:55

## 2023-03-20 RX ADMIN — TIOTROPIUM BROMIDE INHALATION SPRAY 2 PUFF: 3.12 SPRAY, METERED RESPIRATORY (INHALATION) at 11:02

## 2023-03-20 RX ADMIN — ARIPIPRAZOLE 10 MG: 5 TABLET ORAL at 11:22

## 2023-03-20 RX ADMIN — IOPAMIDOL 75 ML: 755 INJECTION, SOLUTION INTRAVENOUS at 13:48

## 2023-03-20 ASSESSMENT — PAIN - FUNCTIONAL ASSESSMENT: PAIN_FUNCTIONAL_ASSESSMENT: NONE - DENIES PAIN

## 2023-03-20 NOTE — H&P
HOSPITALISTS HISTORY AND PHYSICAL    3/20/2023 5:33 PM    Patient Information:  Stevenson Velásquez is a 81 y.o. female [id number]  PCP:  Camerno Ruelas MD (Tel: [phone] )    Chief complaint:    Chief Complaint   Patient presents with    Shortness of Breath     Patient was brought in by [facility] due to having shortness of breath and trouble breathing. Per patent, this has been going on for 4 days. Patient has a history of asthma, copd and chf.         History of Present Illness:  Layman Brought is a 81 y.o. female  with PMHx of hypertension, HFpEF hyperlipidemia, diabetes mellitus, prior history of PE, morbid obesity and ALEXANDRU presented with worsening shortness of breath and hypoxia. Per patient report, she normally uses supplemental O2 only at night but from past 3 to 4 days, has been using 2 L around-the-clock as she desaturates on minimal exertion. Of note, patient was recently seen by pulmonologist; underwent PFTs which showed restrictive lung disease with reduced diffusion capacity and referred her to Dr. Ofelia Mario for cardiac cath. Patient endorsed chest discomfort with deep breaths but any fever, chills, chest pain, palpitations, PND, syncope, recent travel, sick contact, change in medications, bowel or bladder dysfunction. Initial diagnostic lab work showed proBNP of 416. CTPA -ve for PE or any other acute pulmonary pathology. REVIEW OF SYSTEMS:   Detailed 12 point ROS obtained which were negative except what mentioned above in HPI    Past Medical History:   has a past medical history of Asthma, Back pain, Bipolar 1 disorder ([address] Diabetes mellitus ([address] GERD (gastroesophageal reflux disease), Hyperlipidemia, Hypertension, and Tachycardia. Past Surgical History:   has a past surgical history that includes Cholecystectomy; Leg Surgery; Appendectomy; and Endometrial ablation.

## 2023-03-20 NOTE — ED PROVIDER NOTES
Emergency Department Encounter  Location: [address]    Patient: Shauna Buckner  MRN: [id number]  : 1950  Date of evaluation: 3/20/2023  ED Provider: Ted Herzog MD    Shauna Buckner was checked out to me by Dr Chelita Bruner Please see his/her initial documentation for details of the patient's initial ED presentation, physical exam and completed studies. In brief, Shauna Buckner is a 81 y.o. female that presented to the emergency department for shortness of breath  Hx copd, CHF    On exam pt is resting comfortably  Cardiac RRR, no murmur  Lungs clear bilaterally, no increased work of breathing  2 L O2 requirement at baseline. Peripheral edema noted. Echo 2022 reviewed:    Left ventricular function is normal.   Overall left ventricular ejection fraction is estimated to be 55-60%. Left ventricular wall motion is normal.   Left atrial size is at the upper limits of normal.   The Aortic Valve is mildly calcified. No hemodynamically significant valvular aortic stenosis. Borderline dilated ascending aorta. Right ventricular systolic pressure is normal at <35 mmHg. The aortic valve is trileaflet. Left atrial filling pressure is elevated based on E/E >15. A contrast agent was used to demonstrate all left ventricular wall segments.      I have reviewed and interpreted all of the currently available lab results and diagnostics from this visit:    Labs  Results for orders placed or performed during the hospital encounter of 23   CBC with Auto Differential   Result Value Ref Range    WBC 2.4 (L) 4.0 - 11.0 K/uL    RBC 3.44 (L) 4.00 - 5.20 M/uL    Hemoglobin 10.2 (L) 12.0 - 16.0 g/dL    Hematocrit 32.8 (L) 36.0 - 48.0 %    MCV 95.2 80.0 - 100.0 fL    MCH 29.7 26.0 - 34.0 pg    MCHC 31.2 31.0 - 36.0 g/dL    RDW 14.5 12.4 - 15.4 %    Platelets 86 (L) 180 - 450 K/uL    MPV 9.1 5.0 - 10.5 fL    Neutrophils % 53.9 %    Lymphocytes % 32.1 %    Monocytes %
components:    POC Glucose 139 (*)     All other components within normal limits   POCT GLUCOSE - Abnormal; Notable for the following components:    POC Glucose 151 (*)     All other components within normal limits   POCT GLUCOSE - Abnormal; Notable for the following components:    POC Glucose 112 (*)     All other components within normal limits   POCT GLUCOSE - Abnormal; Notable for the following components:    POC Glucose 168 (*)     All other components within normal limits   POCT GLUCOSE - Abnormal; Notable for the following components:    POC Glucose 134 (*)     All other components within normal limits   POCT GLUCOSE - Abnormal; Notable for the following components:    POC Glucose 140 (*)     All other components within normal limits   POCT GLUCOSE - Abnormal; Notable for the following components:    POC Glucose 155 (*)     All other components within normal limits   POCT GLUCOSE - Abnormal; Notable for the following components:    POC Glucose 148 (*)     All other components within normal limits   POCT GLUCOSE - Abnormal; Notable for the following components:    POC Glucose 126 (*)     All other components within normal limits   POCT GLUCOSE - Abnormal; Notable for the following components:    POC Glucose 203 (*)     All other components within normal limits   POCT GLUCOSE - Abnormal; Notable for the following components:    POC Glucose 166 (*)     All other components within normal limits   POCT GLUCOSE - Abnormal; Notable for the following components:    POC Glucose 115 (*)     All other components within normal limits   POCT GLUCOSE - Abnormal; Notable for the following components:    POC Glucose 139 (*)     All other components within normal limits   POCT GLUCOSE - Abnormal; Notable for the following components:    POC Glucose 127 (*)     All other components within normal limits   POCT GLUCOSE - Abnormal; Notable for the following components:    POC Glucose 114 (*)     All other components within normal

## 2023-03-21 LAB
ANION GAP SERPL CALCULATED.3IONS-SCNC: 8 MMOL/L (ref 3–16)
BASOPHILS # BLD: 0 K/UL (ref 0–0.2)
BASOPHILS NFR BLD: 0.5 %
BUN SERPL-MCNC: 10 MG/DL (ref 7–20)
C DIFF TOX A+B STL QL IA: NORMAL
CALCIUM SERPL-MCNC: 9.3 MG/DL (ref 8.3–10.6)
CHLORIDE SERPL-SCNC: 97 MMOL/L (ref 99–110)
CHOLEST SERPL-MCNC: 120 MG/DL (ref 0–199)
CO2 SERPL-SCNC: 36 MMOL/L (ref 21–32)
CREAT SERPL-MCNC: 0.6 MG/DL (ref 0.6–1.2)
DEPRECATED RDW RBC AUTO: 14.6 % (ref 12.4–15.4)
EOSINOPHIL # BLD: 0.2 K/UL (ref 0–0.6)
EOSINOPHIL NFR BLD: 6.1 %
EST. AVERAGE GLUCOSE BLD GHB EST-MCNC: 145.6 MG/DL
GFR SERPLBLD CREATININE-BSD FMLA CKD-EPI: >60 ML/MIN/{1.73_M2}
GLUCOSE BLD-MCNC: 112 MG/DL (ref 70–99)
GLUCOSE BLD-MCNC: 139 MG/DL (ref 70–99)
GLUCOSE BLD-MCNC: 151 MG/DL (ref 70–99)
GLUCOSE BLD-MCNC: 168 MG/DL (ref 70–99)
GLUCOSE SERPL-MCNC: 118 MG/DL (ref 70–99)
HBA1C MFR BLD: 6.7 %
HCT VFR BLD AUTO: 33.1 % (ref 36–48)
HDLC SERPL-MCNC: 49 MG/DL (ref 40–60)
HGB BLD-MCNC: 10.4 G/DL (ref 12–16)
LDLC SERPL CALC-MCNC: 51 MG/DL
LYMPHOCYTES # BLD: 0.9 K/UL (ref 1–5.1)
LYMPHOCYTES NFR BLD: 28.8 %
MAGNESIUM SERPL-MCNC: 1.9 MG/DL (ref 1.8–2.4)
MCH RBC QN AUTO: 29.8 PG (ref 26–34)
MCHC RBC AUTO-ENTMCNC: 31.3 G/DL (ref 31–36)
MCV RBC AUTO: 95.1 FL (ref 80–100)
MONOCYTES # BLD: 0.3 K/UL (ref 0–1.3)
MONOCYTES NFR BLD: 8.2 %
NEUTROPHILS # BLD: 1.8 K/UL (ref 1.7–7.7)
NEUTROPHILS NFR BLD: 56.4 %
ORGANISM: ABNORMAL
PERFORMED ON: ABNORMAL
PLATELET # BLD AUTO: 94 K/UL (ref 135–450)
PMV BLD AUTO: 9 FL (ref 5–10.5)
POTASSIUM SERPL-SCNC: 3.7 MMOL/L (ref 3.5–5.1)
RBC # BLD AUTO: 3.48 M/UL (ref 4–5.2)
REPORT: NORMAL
RESP PATH DNA+RNA PNL NPH NAA+NON-PROBE: ABNORMAL
SODIUM SERPL-SCNC: 141 MMOL/L (ref 136–145)
TRIGL SERPL-MCNC: 102 MG/DL (ref 0–150)
VLDLC SERPL CALC-MCNC: 20 MG/DL
WBC # BLD AUTO: 3.2 K/UL (ref 4–11)

## 2023-03-21 PROCEDURE — 87449 NOS EACH ORGANISM AG IA: CPT

## 2023-03-21 PROCEDURE — 85025 COMPLETE CBC W/AUTO DIFF WBC: CPT

## 2023-03-21 PROCEDURE — 94640 AIRWAY INHALATION TREATMENT: CPT

## 2023-03-21 PROCEDURE — 94760 N-INVAS EAR/PLS OXIMETRY 1: CPT

## 2023-03-21 PROCEDURE — 97530 THERAPEUTIC ACTIVITIES: CPT

## 2023-03-21 PROCEDURE — 6360000002 HC RX W HCPCS: Performed by: INTERNAL MEDICINE

## 2023-03-21 PROCEDURE — 80061 LIPID PANEL: CPT

## 2023-03-21 PROCEDURE — 1200000000 HC SEMI PRIVATE

## 2023-03-21 PROCEDURE — 6370000000 HC RX 637 (ALT 250 FOR IP): Performed by: INTERNAL MEDICINE

## 2023-03-21 PROCEDURE — 97162 PT EVAL MOD COMPLEX 30 MIN: CPT

## 2023-03-21 PROCEDURE — 97116 GAIT TRAINING THERAPY: CPT

## 2023-03-21 PROCEDURE — 2580000003 HC RX 258: Performed by: INTERNAL MEDICINE

## 2023-03-21 PROCEDURE — 36415 COLL VENOUS BLD VENIPUNCTURE: CPT

## 2023-03-21 PROCEDURE — 87324 CLOSTRIDIUM AG IA: CPT

## 2023-03-21 PROCEDURE — 2700000000 HC OXYGEN THERAPY PER DAY

## 2023-03-21 PROCEDURE — 80048 BASIC METABOLIC PNL TOTAL CA: CPT

## 2023-03-21 PROCEDURE — 83735 ASSAY OF MAGNESIUM: CPT

## 2023-03-21 RX ADMIN — ACETAMINOPHEN 650 MG: 325 TABLET ORAL at 08:26

## 2023-03-21 RX ADMIN — TIOTROPIUM BROMIDE INHALATION SPRAY 2 PUFF: 3.12 SPRAY, METERED RESPIRATORY (INHALATION) at 07:47

## 2023-03-21 RX ADMIN — LISINOPRIL 30 MG: 20 TABLET ORAL at 08:26

## 2023-03-21 RX ADMIN — Medication 10 ML: at 21:14

## 2023-03-21 RX ADMIN — MONTELUKAST SODIUM 10 MG: 10 TABLET, FILM COATED ORAL at 21:13

## 2023-03-21 RX ADMIN — QUETIAPINE FUMARATE 200 MG: 200 TABLET ORAL at 21:13

## 2023-03-21 RX ADMIN — INSULIN GLARGINE 10 UNITS: 100 INJECTION, SOLUTION SUBCUTANEOUS at 21:17

## 2023-03-21 RX ADMIN — FERROUS SULFATE TAB 325 MG (65 MG ELEMENTAL FE) 325 MG: 325 (65 FE) TAB at 08:26

## 2023-03-21 RX ADMIN — ENOXAPARIN SODIUM 30 MG: 100 INJECTION SUBCUTANEOUS at 08:26

## 2023-03-21 RX ADMIN — ENOXAPARIN SODIUM 30 MG: 100 INJECTION SUBCUTANEOUS at 21:15

## 2023-03-21 RX ADMIN — ATORVASTATIN CALCIUM 10 MG: 10 TABLET, FILM COATED ORAL at 21:13

## 2023-03-21 RX ADMIN — ARIPIPRAZOLE 5 MG: 5 TABLET ORAL at 08:26

## 2023-03-21 RX ADMIN — ACETAMINOPHEN 650 MG: 325 TABLET ORAL at 21:14

## 2023-03-21 RX ADMIN — Medication 10 ML: at 08:27

## 2023-03-21 RX ADMIN — QUETIAPINE FUMARATE 100 MG: 100 TABLET ORAL at 08:26

## 2023-03-21 ASSESSMENT — PAIN SCALES - GENERAL
PAINLEVEL_OUTOF10: 6
PAINLEVEL_OUTOF10: 6

## 2023-03-21 ASSESSMENT — PAIN DESCRIPTION - PAIN TYPE: TYPE: ACUTE PAIN

## 2023-03-21 ASSESSMENT — PAIN DESCRIPTION - DESCRIPTORS
DESCRIPTORS: ACHING
DESCRIPTORS: ACHING

## 2023-03-21 ASSESSMENT — PAIN DESCRIPTION - LOCATION
LOCATION: HEAD
LOCATION: GENERALIZED

## 2023-03-21 NOTE — DISCHARGE INSTR - DIET
For nutrition questions after discharge please call the Registered Dietitian at [phone]. Heart Failure Nutrition Therapy  This diet will help you feel better and support your heart by reducing symptoms of fluid retention, shortness of breath and swelling. You should focus on:  Limiting sodium in your diet by reading labels and limiting foods high in sodium. Limit your daily sodium intake to 2,000 mg per day. Select foods with 140 mg of sodium or less per serving. Foods with more than 300 mg of sodium per serving may not fit into a reduced-sodium meal plan. Check serving sizes. If you eat more than 1 serving, you will get more sodium than the amount listed. Limiting fluid in your diet. Ask your doctor how much fluid you can have per day  Remember foods that are liquid at room temperature such as popsicles, soup, ice cream and Jell-O are fluids. Checking your weight to make sure you're not retaining too much fluid. Weigh yourself every morning. If you gain 3 or more pounds in one day or 5 pounds within 1 week, call your doctor. Foods to choose and avoid:  Avoid processed foods. Eat more fresh foods. Fresh and frozen fruits and vegetables are good choices. Choose fresh meats. Avoid processed meats such as cervantes, sausage and hot dogs. Do not add salt to your food while cooking or at the table. Try dry or fresh herbs, pepper, lemon juice, or a sodium-free seasoning blend such as Mrs. Dash to add flavor to food. Do not use a salt substitute. Use caution at [employer] foods are high in sodium. Ask for your food to be cooked without salt and request sauces and dressing to come on the side. 

## 2023-03-21 NOTE — PLAN OF CARE
Problem: Discharge Planning  Goal: Discharge to home or other facility with appropriate resources  Outcome: Progressing     Problem: Safety - Adult  Goal: Free from fall injury  Outcome: Progressing     Problem: Respiratory - Adult  Goal: Achieves optimal ventilation and oxygenation  Outcome: Progressing

## 2023-03-22 ENCOUNTER — APPOINTMENT (OUTPATIENT)
Dept: ULTRASOUND IMAGING | Age: 73
End: 2023-03-22
Payer: MEDICARE

## 2023-03-22 LAB
ANION GAP SERPL CALCULATED.3IONS-SCNC: 11 MMOL/L (ref 3–16)
BASOPHILS # BLD: 0 K/UL (ref 0–0.2)
BASOPHILS NFR BLD: 0.6 %
BUN SERPL-MCNC: 12 MG/DL (ref 7–20)
CALCIUM SERPL-MCNC: 9.1 MG/DL (ref 8.3–10.6)
CHLORIDE SERPL-SCNC: 99 MMOL/L (ref 99–110)
CO2 SERPL-SCNC: 30 MMOL/L (ref 21–32)
CREAT SERPL-MCNC: 0.6 MG/DL (ref 0.6–1.2)
CRP SERPL-MCNC: 3.1 MG/L (ref 0–5.1)
DEPRECATED RDW RBC AUTO: 14.5 % (ref 12.4–15.4)
EOSINOPHIL # BLD: 0.2 K/UL (ref 0–0.6)
EOSINOPHIL NFR BLD: 5.2 %
GFR SERPLBLD CREATININE-BSD FMLA CKD-EPI: >60 ML/MIN/{1.73_M2}
GLUCOSE BLD-MCNC: 134 MG/DL (ref 70–99)
GLUCOSE BLD-MCNC: 140 MG/DL (ref 70–99)
GLUCOSE BLD-MCNC: 148 MG/DL (ref 70–99)
GLUCOSE BLD-MCNC: 155 MG/DL (ref 70–99)
GLUCOSE SERPL-MCNC: 123 MG/DL (ref 70–99)
HCT VFR BLD AUTO: 33.8 % (ref 36–48)
HGB BLD-MCNC: 10.7 G/DL (ref 12–16)
LV EF: 58 %
LVEF MODALITY: NORMAL
LYMPHOCYTES # BLD: 1.2 K/UL (ref 1–5.1)
LYMPHOCYTES NFR BLD: 27.9 %
MAGNESIUM SERPL-MCNC: 2.1 MG/DL (ref 1.8–2.4)
MCH RBC QN AUTO: 30.3 PG (ref 26–34)
MCHC RBC AUTO-ENTMCNC: 31.8 G/DL (ref 31–36)
MCV RBC AUTO: 95.4 FL (ref 80–100)
MONOCYTES # BLD: 0.4 K/UL (ref 0–1.3)
MONOCYTES NFR BLD: 8.7 %
NEUTROPHILS # BLD: 2.4 K/UL (ref 1.7–7.7)
NEUTROPHILS NFR BLD: 57.6 %
PERFORMED ON: ABNORMAL
PLATELET # BLD AUTO: 101 K/UL (ref 135–450)
PMV BLD AUTO: 8.9 FL (ref 5–10.5)
POTASSIUM SERPL-SCNC: 3.8 MMOL/L (ref 3.5–5.1)
RBC # BLD AUTO: 3.54 M/UL (ref 4–5.2)
SODIUM SERPL-SCNC: 140 MMOL/L (ref 136–145)
WBC # BLD AUTO: 4.2 K/UL (ref 4–11)

## 2023-03-22 PROCEDURE — 6360000004 HC RX CONTRAST MEDICATION: Performed by: INTERNAL MEDICINE

## 2023-03-22 PROCEDURE — 94760 N-INVAS EAR/PLS OXIMETRY 1: CPT

## 2023-03-22 PROCEDURE — 83735 ASSAY OF MAGNESIUM: CPT

## 2023-03-22 PROCEDURE — 1200000000 HC SEMI PRIVATE

## 2023-03-22 PROCEDURE — 80048 BASIC METABOLIC PNL TOTAL CA: CPT

## 2023-03-22 PROCEDURE — 94640 AIRWAY INHALATION TREATMENT: CPT

## 2023-03-22 PROCEDURE — 97530 THERAPEUTIC ACTIVITIES: CPT

## 2023-03-22 PROCEDURE — 86140 C-REACTIVE PROTEIN: CPT

## 2023-03-22 PROCEDURE — 97535 SELF CARE MNGMENT TRAINING: CPT

## 2023-03-22 PROCEDURE — 85025 COMPLETE CBC W/AUTO DIFF WBC: CPT

## 2023-03-22 PROCEDURE — 6370000000 HC RX 637 (ALT 250 FOR IP): Performed by: INTERNAL MEDICINE

## 2023-03-22 PROCEDURE — C8929 TTE W OR WO FOL WCON,DOPPLER: HCPCS

## 2023-03-22 PROCEDURE — 2700000000 HC OXYGEN THERAPY PER DAY

## 2023-03-22 PROCEDURE — 97165 OT EVAL LOW COMPLEX 30 MIN: CPT

## 2023-03-22 PROCEDURE — 97166 OT EVAL MOD COMPLEX 45 MIN: CPT

## 2023-03-22 PROCEDURE — 76700 US EXAM ABDOM COMPLETE: CPT

## 2023-03-22 PROCEDURE — 94680 O2 UPTK RST&XERS DIR SIMPLE: CPT

## 2023-03-22 PROCEDURE — 36415 COLL VENOUS BLD VENIPUNCTURE: CPT

## 2023-03-22 PROCEDURE — 6360000002 HC RX W HCPCS: Performed by: INTERNAL MEDICINE

## 2023-03-22 PROCEDURE — 82728 ASSAY OF FERRITIN: CPT

## 2023-03-22 PROCEDURE — 2580000003 HC RX 258: Performed by: INTERNAL MEDICINE

## 2023-03-22 RX ORDER — FUROSEMIDE 10 MG/ML
20 INJECTION INTRAMUSCULAR; INTRAVENOUS ONCE
Status: COMPLETED | OUTPATIENT
Start: 2023-03-22 | End: 2023-03-22

## 2023-03-22 RX ORDER — FUROSEMIDE 10 MG/ML
40 INJECTION INTRAMUSCULAR; INTRAVENOUS DAILY
Status: DISCONTINUED | OUTPATIENT
Start: 2023-03-23 | End: 2023-03-23

## 2023-03-22 RX ADMIN — ATORVASTATIN CALCIUM 10 MG: 10 TABLET, FILM COATED ORAL at 20:34

## 2023-03-22 RX ADMIN — ACETAMINOPHEN 650 MG: 325 TABLET ORAL at 05:57

## 2023-03-22 RX ADMIN — QUETIAPINE FUMARATE 200 MG: 200 TABLET ORAL at 20:34

## 2023-03-22 RX ADMIN — TIOTROPIUM BROMIDE INHALATION SPRAY 2 PUFF: 3.12 SPRAY, METERED RESPIRATORY (INHALATION) at 11:10

## 2023-03-22 RX ADMIN — INSULIN GLARGINE 10 UNITS: 100 INJECTION, SOLUTION SUBCUTANEOUS at 20:35

## 2023-03-22 RX ADMIN — Medication 10 ML: at 08:26

## 2023-03-22 RX ADMIN — PERFLUTREN 1.5 ML: 6.52 INJECTION, SUSPENSION INTRAVENOUS at 14:01

## 2023-03-22 RX ADMIN — FUROSEMIDE 20 MG: 10 INJECTION, SOLUTION INTRAMUSCULAR; INTRAVENOUS at 20:47

## 2023-03-22 RX ADMIN — MONTELUKAST SODIUM 10 MG: 10 TABLET, FILM COATED ORAL at 20:34

## 2023-03-22 RX ADMIN — Medication 10 ML: at 20:47

## 2023-03-22 RX ADMIN — LISINOPRIL 30 MG: 20 TABLET ORAL at 08:25

## 2023-03-22 RX ADMIN — ENOXAPARIN SODIUM 30 MG: 100 INJECTION SUBCUTANEOUS at 20:35

## 2023-03-22 RX ADMIN — FERROUS SULFATE TAB 325 MG (65 MG ELEMENTAL FE) 325 MG: 325 (65 FE) TAB at 08:24

## 2023-03-22 RX ADMIN — ACETAMINOPHEN 650 MG: 325 TABLET ORAL at 20:34

## 2023-03-22 RX ADMIN — QUETIAPINE FUMARATE 100 MG: 100 TABLET ORAL at 08:24

## 2023-03-22 RX ADMIN — ARIPIPRAZOLE 10 MG: 5 TABLET ORAL at 08:25

## 2023-03-22 RX ADMIN — ENOXAPARIN SODIUM 30 MG: 100 INJECTION SUBCUTANEOUS at 08:25

## 2023-03-22 ASSESSMENT — PAIN SCALES - GENERAL
PAINLEVEL_OUTOF10: 6
PAINLEVEL_OUTOF10: 6
PAINLEVEL_OUTOF10: 4

## 2023-03-22 ASSESSMENT — PAIN DESCRIPTION - LOCATION
LOCATION: HEAD
LOCATION: HEAD

## 2023-03-22 ASSESSMENT — PAIN DESCRIPTION - PAIN TYPE: TYPE: ACUTE PAIN

## 2023-03-22 ASSESSMENT — PAIN DESCRIPTION - DESCRIPTORS
DESCRIPTORS: ACHING
DESCRIPTORS: ACHING

## 2023-03-22 NOTE — PLAN OF CARE
Problem: Discharge Planning  Goal: Discharge to home or other facility with appropriate resources  Outcome: Progressing     Problem: Safety - Adult  Goal: Free from fall injury  Outcome: Progressing     Problem: Respiratory - Adult  Goal: Achieves optimal ventilation and oxygenation  Outcome: Progressing     Problem: Chronic Conditions and Co-morbidities  Goal: Patient's chronic conditions and co-morbidity symptoms are monitored and maintained or improved  Outcome: Progressing     Problem: Pain  Goal: Verbalizes/displays adequate comfort level or baseline comfort level  Outcome: Progressing

## 2023-03-22 NOTE — DISCHARGE INSTRUCTIONS
0 =  [x]  1 =    [] 2 =    [] 3 =    []    Total Score = 14    If your total score is 10 or greater, you are experiencing excessive sleepiness and should consider seeking a medical follow-up. Take a copy of this screening test to your primary care physician on your next office visit. Interpretation:      0 -   7: It is unlikely that you are abnormally sleepy. 8 -   9: You have an average amount of daytime sleepiness. 10 - 15: You may be excessively sleepy depending on the situation. You may want to consider seeking medical attention. 16 - 24: You are excessively sleepy and should consider seeking medical attention.

## 2023-03-22 NOTE — DISCHARGE INSTR - COC
Continuity of Care Form    Patient Name: Osbaldo Vaughn   :  1950  MRN:  [id number]    Admit date:  3/20/2023  Discharge date:  ***    Code Status Order: Full Code   Advance Directives:     Admitting Physician:  Tricia Radford MD  PCP: Israel Laird MD    Discharging Nurse: [facility] Unit/Room#: 3AN-3321/3321-01  Discharging Unit Phone Number: ***    Emergency Contact:   Extended Emergency Contact Information  Primary Emergency Contact: Shelby Gomez  Home Phone: [phone]  Relation: Brother/Sister    Past Surgical History:  Past Surgical History:   Procedure Laterality Date    APPENDECTOMY      CHOLECYSTECTOMY      ENDOMETRIAL ABLATION      LEG SURGERY      LEFT TIB FIB FX, SURGERY X2       Immunization History:   Immunization History   Administered Date(s) Administered    COVID-19, PFIZER PURPLE top, DILUTE for use, (age 15 y+), 30mcg/0.3mL 2021, 2021, 2022       Active Problems:  Patient Active Problem List   Diagnosis Code    CHF (congestive heart failure), NYHA class I, [facility] I[facility].43       Isolation/Infection:   Isolation            Droplet Plus          Patient Infection Status       Infection Onset Added Last Indicated Last Indicated By Review Planned Expiration Resolved Resolved By    None active    Resolved    C-diff Rule Out 23 Clostridium difficile toxin/antigen (Ordered)   23 Rule-Out Test Resulted    COVID-19 (Rule Out) 23 Respiratory Panel, Molecular, with COVID-19 (Restricted: peds pts or suitable admitted adults) (Ordered)   23 Rule-Out Test Resulted            Nurse Assessment:  Last Vital Signs: /67   Pulse 77   Temp 97.8 °F (36.6 °C) (Oral)   Resp 20   Ht 5' 1\" (1.549 m)   Wt 277 lb 9 oz (125.9 kg)   SpO2 96%   BMI 52.44 kg/m²     Last documented pain score (0-10 scale): Pain Level: 4  Last Weight:   Wt Readings from Last 1 Encounters:   23 277

## 2023-03-23 PROBLEM — J96.21 ACUTE ON CHRONIC RESPIRATORY FAILURE WITH HYPOXIA (HCC): Status: ACTIVE | Noted: 2023-03-23

## 2023-03-23 PROBLEM — D61.818 PANCYTOPENIA (HCC): Status: ACTIVE | Noted: 2023-03-23

## 2023-03-23 PROBLEM — I50.9 ACUTE ON CHRONIC CONGESTIVE HEART FAILURE (HCC): Status: ACTIVE | Noted: 2023-03-23

## 2023-03-23 PROBLEM — E66.01 MORBID OBESITY (HCC): Status: ACTIVE | Noted: 2023-03-23

## 2023-03-23 PROBLEM — D50.9 IRON DEFICIENCY ANEMIA: Status: ACTIVE | Noted: 2023-03-23

## 2023-03-23 LAB
ANION GAP SERPL CALCULATED.3IONS-SCNC: 7 MMOL/L (ref 3–16)
BASE EXCESS BLDA CALC-SCNC: 11.1 MMOL/L (ref -3–3)
BASOPHILS # BLD: 0 K/UL (ref 0–0.2)
BASOPHILS NFR BLD: 0.9 %
BUN SERPL-MCNC: 8 MG/DL (ref 7–20)
CALCIUM SERPL-MCNC: 9 MG/DL (ref 8.3–10.6)
CHLORIDE SERPL-SCNC: 100 MMOL/L (ref 99–110)
CO2 BLDA-SCNC: 86.8 MMOL/L
CO2 SERPL-SCNC: 33 MMOL/L (ref 21–32)
COHGB MFR BLDA: 1.6 % (ref 0–1.5)
CREAT SERPL-MCNC: 0.6 MG/DL (ref 0.6–1.2)
DEPRECATED RDW RBC AUTO: 14.6 % (ref 12.4–15.4)
EOSINOPHIL # BLD: 0.2 K/UL (ref 0–0.6)
EOSINOPHIL NFR BLD: 5.9 %
FERRITIN SERPL IA-MCNC: 57.1 NG/ML (ref 15–150)
GFR SERPLBLD CREATININE-BSD FMLA CKD-EPI: >60 ML/MIN/{1.73_M2}
GLUCOSE BLD-MCNC: 115 MG/DL (ref 70–99)
GLUCOSE BLD-MCNC: 126 MG/DL (ref 70–99)
GLUCOSE BLD-MCNC: 166 MG/DL (ref 70–99)
GLUCOSE BLD-MCNC: 203 MG/DL (ref 70–99)
GLUCOSE SERPL-MCNC: 147 MG/DL (ref 70–99)
HCO3 BLDA-SCNC: 37.1 MMOL/L (ref 21–29)
HCT VFR BLD AUTO: 32.5 % (ref 36–48)
HGB BLD-MCNC: 10.4 G/DL (ref 12–16)
HGB BLDA-MCNC: 11.6 G/DL (ref 12–16)
LYMPHOCYTES # BLD: 0.8 K/UL (ref 1–5.1)
LYMPHOCYTES NFR BLD: 24.2 %
MAGNESIUM SERPL-MCNC: 1.9 MG/DL (ref 1.8–2.4)
MCH RBC QN AUTO: 30.3 PG (ref 26–34)
MCHC RBC AUTO-ENTMCNC: 32.1 G/DL (ref 31–36)
MCV RBC AUTO: 94.3 FL (ref 80–100)
METHGB MFR BLDA: 0.4 %
MONOCYTES # BLD: 0.3 K/UL (ref 0–1.3)
MONOCYTES NFR BLD: 8.1 %
NEUTROPHILS # BLD: 2.1 K/UL (ref 1.7–7.7)
NEUTROPHILS NFR BLD: 60.9 %
NT-PROBNP SERPL-MCNC: 80 PG/ML (ref 0–124)
O2 THERAPY: ABNORMAL
PCO2 BLDA: 54.4 MMHG (ref 35–45)
PERFORMED ON: ABNORMAL
PH BLDA: 7.44 [PH] (ref 7.35–7.45)
PLATELET # BLD AUTO: 97 K/UL (ref 135–450)
PMV BLD AUTO: 8.7 FL (ref 5–10.5)
PO2 BLDA: 72.8 MMHG (ref 75–108)
POTASSIUM SERPL-SCNC: 4 MMOL/L (ref 3.5–5.1)
RBC # BLD AUTO: 3.45 M/UL (ref 4–5.2)
S PYO AG THROAT QL: NEGATIVE
SAO2 % BLDA: 95.4 %
SODIUM SERPL-SCNC: 140 MMOL/L (ref 136–145)
WBC # BLD AUTO: 3.4 K/UL (ref 4–11)

## 2023-03-23 PROCEDURE — 1200000000 HC SEMI PRIVATE

## 2023-03-23 PROCEDURE — 82803 BLOOD GASES ANY COMBINATION: CPT

## 2023-03-23 PROCEDURE — 36600 WITHDRAWAL OF ARTERIAL BLOOD: CPT

## 2023-03-23 PROCEDURE — 99223 1ST HOSP IP/OBS HIGH 75: CPT | Performed by: INTERNAL MEDICINE

## 2023-03-23 PROCEDURE — 85025 COMPLETE CBC W/AUTO DIFF WBC: CPT

## 2023-03-23 PROCEDURE — 6360000002 HC RX W HCPCS: Performed by: INTERNAL MEDICINE

## 2023-03-23 PROCEDURE — 87880 STREP A ASSAY W/OPTIC: CPT

## 2023-03-23 PROCEDURE — 83735 ASSAY OF MAGNESIUM: CPT

## 2023-03-23 PROCEDURE — 2580000003 HC RX 258: Performed by: INTERNAL MEDICINE

## 2023-03-23 PROCEDURE — 87081 CULTURE SCREEN ONLY: CPT

## 2023-03-23 PROCEDURE — 36415 COLL VENOUS BLD VENIPUNCTURE: CPT

## 2023-03-23 PROCEDURE — 80048 BASIC METABOLIC PNL TOTAL CA: CPT

## 2023-03-23 PROCEDURE — 83880 ASSAY OF NATRIURETIC PEPTIDE: CPT

## 2023-03-23 PROCEDURE — 6370000000 HC RX 637 (ALT 250 FOR IP): Performed by: INTERNAL MEDICINE

## 2023-03-23 RX ORDER — FUROSEMIDE 10 MG/ML
80 INJECTION INTRAMUSCULAR; INTRAVENOUS 2 TIMES DAILY
Status: DISCONTINUED | OUTPATIENT
Start: 2023-03-23 | End: 2023-03-26

## 2023-03-23 RX ORDER — LISINOPRIL 20 MG/1
20 TABLET ORAL DAILY
Status: DISCONTINUED | OUTPATIENT
Start: 2023-03-24 | End: 2023-03-23

## 2023-03-23 RX ORDER — SPIRONOLACTONE 25 MG/1
12.5 TABLET ORAL DAILY
Status: DISCONTINUED | OUTPATIENT
Start: 2023-03-24 | End: 2023-03-27 | Stop reason: HOSPADM

## 2023-03-23 RX ADMIN — MONTELUKAST SODIUM 10 MG: 10 TABLET, FILM COATED ORAL at 22:15

## 2023-03-23 RX ADMIN — Medication 10 ML: at 08:45

## 2023-03-23 RX ADMIN — ACETAMINOPHEN 650 MG: 325 TABLET ORAL at 05:38

## 2023-03-23 RX ADMIN — ENOXAPARIN SODIUM 30 MG: 100 INJECTION SUBCUTANEOUS at 22:17

## 2023-03-23 RX ADMIN — ARIPIPRAZOLE 10 MG: 5 TABLET ORAL at 08:44

## 2023-03-23 RX ADMIN — ACETAMINOPHEN 650 MG: 325 TABLET ORAL at 12:21

## 2023-03-23 RX ADMIN — QUETIAPINE FUMARATE 200 MG: 200 TABLET ORAL at 22:15

## 2023-03-23 RX ADMIN — INSULIN GLARGINE 10 UNITS: 100 INJECTION, SOLUTION SUBCUTANEOUS at 22:10

## 2023-03-23 RX ADMIN — ACETAMINOPHEN 650 MG: 325 TABLET ORAL at 22:16

## 2023-03-23 RX ADMIN — Medication 10 ML: at 22:31

## 2023-03-23 RX ADMIN — LISINOPRIL 30 MG: 20 TABLET ORAL at 08:44

## 2023-03-23 RX ADMIN — ENOXAPARIN SODIUM 30 MG: 100 INJECTION SUBCUTANEOUS at 08:45

## 2023-03-23 RX ADMIN — ATORVASTATIN CALCIUM 10 MG: 10 TABLET, FILM COATED ORAL at 22:16

## 2023-03-23 RX ADMIN — FUROSEMIDE 40 MG: 10 INJECTION, SOLUTION INTRAMUSCULAR; INTRAVENOUS at 08:44

## 2023-03-23 RX ADMIN — FUROSEMIDE 80 MG: 10 INJECTION, SOLUTION INTRAMUSCULAR; INTRAVENOUS at 22:12

## 2023-03-23 RX ADMIN — FERROUS SULFATE TAB 325 MG (65 MG ELEMENTAL FE) 325 MG: 325 (65 FE) TAB at 08:44

## 2023-03-23 RX ADMIN — QUETIAPINE FUMARATE 100 MG: 100 TABLET ORAL at 08:44

## 2023-03-23 ASSESSMENT — PAIN SCALES - GENERAL
PAINLEVEL_OUTOF10: 3
PAINLEVEL_OUTOF10: 6

## 2023-03-23 ASSESSMENT — PAIN DESCRIPTION - LOCATION: LOCATION: HEAD

## 2023-03-23 ASSESSMENT — PAIN DESCRIPTION - DESCRIPTORS: DESCRIPTORS: ACHING

## 2023-03-24 LAB
ANION GAP SERPL CALCULATED.3IONS-SCNC: 8 MMOL/L (ref 3–16)
BUN SERPL-MCNC: 10 MG/DL (ref 7–20)
CALCIUM SERPL-MCNC: 9.2 MG/DL (ref 8.3–10.6)
CHLORIDE SERPL-SCNC: 98 MMOL/L (ref 99–110)
CO2 SERPL-SCNC: 37 MMOL/L (ref 21–32)
CREAT SERPL-MCNC: 0.6 MG/DL (ref 0.6–1.2)
DEPRECATED RDW RBC AUTO: 14.3 % (ref 12.4–15.4)
GFR SERPLBLD CREATININE-BSD FMLA CKD-EPI: >60 ML/MIN/{1.73_M2}
GLUCOSE BLD-MCNC: 114 MG/DL (ref 70–99)
GLUCOSE BLD-MCNC: 127 MG/DL (ref 70–99)
GLUCOSE BLD-MCNC: 139 MG/DL (ref 70–99)
GLUCOSE BLD-MCNC: 148 MG/DL (ref 70–99)
GLUCOSE SERPL-MCNC: 125 MG/DL (ref 70–99)
HCT VFR BLD AUTO: 35.6 % (ref 36–48)
HGB BLD-MCNC: 11.3 G/DL (ref 12–16)
MAGNESIUM SERPL-MCNC: 2.5 MG/DL (ref 1.8–2.4)
MCH RBC QN AUTO: 29.8 PG (ref 26–34)
MCHC RBC AUTO-ENTMCNC: 31.8 G/DL (ref 31–36)
MCV RBC AUTO: 93.8 FL (ref 80–100)
PERFORMED ON: ABNORMAL
PLATELET # BLD AUTO: 109 K/UL (ref 135–450)
PMV BLD AUTO: 8.7 FL (ref 5–10.5)
POTASSIUM SERPL-SCNC: 3.9 MMOL/L (ref 3.5–5.1)
RBC # BLD AUTO: 3.8 M/UL (ref 4–5.2)
SODIUM SERPL-SCNC: 143 MMOL/L (ref 136–145)
WBC # BLD AUTO: 3.5 K/UL (ref 4–11)

## 2023-03-24 PROCEDURE — 97530 THERAPEUTIC ACTIVITIES: CPT

## 2023-03-24 PROCEDURE — 97110 THERAPEUTIC EXERCISES: CPT

## 2023-03-24 PROCEDURE — 85027 COMPLETE CBC AUTOMATED: CPT

## 2023-03-24 PROCEDURE — 1200000000 HC SEMI PRIVATE

## 2023-03-24 PROCEDURE — 6360000002 HC RX W HCPCS: Performed by: INTERNAL MEDICINE

## 2023-03-24 PROCEDURE — 36415 COLL VENOUS BLD VENIPUNCTURE: CPT

## 2023-03-24 PROCEDURE — 94761 N-INVAS EAR/PLS OXIMETRY MLT: CPT

## 2023-03-24 PROCEDURE — 2580000003 HC RX 258: Performed by: INTERNAL MEDICINE

## 2023-03-24 PROCEDURE — 6370000000 HC RX 637 (ALT 250 FOR IP): Performed by: INTERNAL MEDICINE

## 2023-03-24 PROCEDURE — 99233 SBSQ HOSP IP/OBS HIGH 50: CPT | Performed by: INTERNAL MEDICINE

## 2023-03-24 PROCEDURE — 97116 GAIT TRAINING THERAPY: CPT

## 2023-03-24 PROCEDURE — 80048 BASIC METABOLIC PNL TOTAL CA: CPT

## 2023-03-24 PROCEDURE — 83735 ASSAY OF MAGNESIUM: CPT

## 2023-03-24 PROCEDURE — 2700000000 HC OXYGEN THERAPY PER DAY

## 2023-03-24 PROCEDURE — 94640 AIRWAY INHALATION TREATMENT: CPT

## 2023-03-24 RX ORDER — KETOROLAC TROMETHAMINE 30 MG/ML
30 INJECTION, SOLUTION INTRAMUSCULAR; INTRAVENOUS EVERY 6 HOURS PRN
Status: DISCONTINUED | OUTPATIENT
Start: 2023-03-24 | End: 2023-03-27 | Stop reason: HOSPADM

## 2023-03-24 RX ADMIN — ACETAMINOPHEN 650 MG: 325 TABLET ORAL at 10:51

## 2023-03-24 RX ADMIN — QUETIAPINE FUMARATE 100 MG: 100 TABLET ORAL at 08:22

## 2023-03-24 RX ADMIN — SPIRONOLACTONE 12.5 MG: 25 TABLET ORAL at 08:22

## 2023-03-24 RX ADMIN — IRON SUCROSE 200 MG: 20 INJECTION, SOLUTION INTRAVENOUS at 11:39

## 2023-03-24 RX ADMIN — TIOTROPIUM BROMIDE INHALATION SPRAY 2 PUFF: 3.12 SPRAY, METERED RESPIRATORY (INHALATION) at 09:00

## 2023-03-24 RX ADMIN — FERROUS SULFATE TAB 325 MG (65 MG ELEMENTAL FE) 325 MG: 325 (65 FE) TAB at 08:23

## 2023-03-24 RX ADMIN — QUETIAPINE FUMARATE 200 MG: 200 TABLET ORAL at 20:31

## 2023-03-24 RX ADMIN — KETOROLAC TROMETHAMINE 30 MG: 30 INJECTION, SOLUTION INTRAMUSCULAR; INTRAVENOUS at 15:30

## 2023-03-24 RX ADMIN — ENOXAPARIN SODIUM 30 MG: 100 INJECTION SUBCUTANEOUS at 20:32

## 2023-03-24 RX ADMIN — MONTELUKAST SODIUM 10 MG: 10 TABLET, FILM COATED ORAL at 20:31

## 2023-03-24 RX ADMIN — Medication 10 ML: at 20:32

## 2023-03-24 RX ADMIN — FUROSEMIDE 80 MG: 10 INJECTION, SOLUTION INTRAMUSCULAR; INTRAVENOUS at 17:10

## 2023-03-24 RX ADMIN — LISINOPRIL 30 MG: 20 TABLET ORAL at 08:21

## 2023-03-24 RX ADMIN — ATORVASTATIN CALCIUM 10 MG: 10 TABLET, FILM COATED ORAL at 20:32

## 2023-03-24 RX ADMIN — ENOXAPARIN SODIUM 30 MG: 100 INJECTION SUBCUTANEOUS at 08:25

## 2023-03-24 RX ADMIN — EMPAGLIFLOZIN 10 MG: 10 TABLET, FILM COATED ORAL at 08:22

## 2023-03-24 RX ADMIN — FUROSEMIDE 80 MG: 10 INJECTION, SOLUTION INTRAMUSCULAR; INTRAVENOUS at 08:23

## 2023-03-24 RX ADMIN — ACETAMINOPHEN 650 MG: 325 TABLET ORAL at 04:53

## 2023-03-24 RX ADMIN — INSULIN GLARGINE 10 UNITS: 100 INJECTION, SOLUTION SUBCUTANEOUS at 20:33

## 2023-03-24 RX ADMIN — ARIPIPRAZOLE 10 MG: 5 TABLET ORAL at 08:22

## 2023-03-24 RX ADMIN — Medication 10 ML: at 08:25

## 2023-03-24 ASSESSMENT — PAIN DESCRIPTION - ORIENTATION: ORIENTATION: MID

## 2023-03-24 ASSESSMENT — PAIN SCALES - GENERAL
PAINLEVEL_OUTOF10: 6
PAINLEVEL_OUTOF10: 6
PAINLEVEL_OUTOF10: 4
PAINLEVEL_OUTOF10: 2

## 2023-03-24 ASSESSMENT — PAIN DESCRIPTION - LOCATION
LOCATION: HEAD

## 2023-03-24 ASSESSMENT — PAIN DESCRIPTION - DESCRIPTORS
DESCRIPTORS: ACHING

## 2023-03-24 NOTE — CONSULTS
Recent Labs     03/20/23  0517   PROT 7.0     PTT:  No results for input(s): APTT in the last 720 hours. CMP:    Recent Labs     03/23/23  0434 03/22/23  0520 03/21/23  0425 03/20/23  0517    140 141 142   K 4.0 3.8 3.7 3.9    99 97* 97*   CO2 33* 30 36* 35*   GLUCOSE 147* 123* 118* 117*   BUN 8 12 10 11   CREATININE 0.6 0.6 0.6 0.5*   LABGLOM >60 >60 >60 >60   CALCIUM 9.0 9.1 9.3 9.3   PROT  --   --   --  7.0   LABALBU  --   --   --  3.9   AGRATIO  --   --   --  1.3   BILITOT  --   --   --  0.3   ALKPHOS  --   --   --  72   ALT  --   --   --  11   AST  --   --   --  19   MG 1.90 2.10 1.90 1.80       Lab Results   Component Value Date    CALCIUM 9.0 03/23/2023       LDH:No results for input(s): LDH in the last 720 hours. Radiology Review:  US ABDOMEN COMPLETE  Narrative: EXAMINATION:  COMPLETE ABDOMINAL ULTRASOUND 3/22/2023 3:09 pm    COMPARISON:  None. HISTORY:  ORDERING SYSTEM PROVIDED HISTORY: pancytopenia, evaluate for hepatomegaly,  splenomegaly  TECHNOLOGIST PROVIDED HISTORY:    Reason for exam:->pancytopenia, evaluate for hepatomegaly, splenomegaly  Reason for Exam: pancytopenia  Eval for hepatomegaly and splenomegaly  Additional signs and symptoms: +coronavirus  Relevant Medical/Surgical History: CHF (congestive heart failure), NYHA class  I, acute on chronic[facility]    FINDINGS:  LIVER: The liver demonstrates normal echogenicity without evidence of  intrahepatic biliary ductal dilatation. Enlarged 25.25 cm. BILIARY SYSTEM: Not present    Common bile duct is within normal limits measuring 6.7 mm. KIDNEYS: The kidneys are unremarkable in appearance without evidence of  hydronephrosis. The left kidney is barely seen. No hydronephrosis. PANCREAS: Visualized portions of the pancreas are unremarkable. SPLEEN: Enlarged at 16.1 x 8.8 by 9.7 cm. Volume is 725 mL. IVC: The IVC is patent. AORTA: Not seen secondary to overlying bowel gas.     OTHER: No evidence of
pounds in one week. Mutually agreed upon goals were discussed such as calling the MD as soon as they recognize symptoms and weight gain, maintaining proper diet, taking medications as prescribed, joining cardiac rehab when able. Also reviewed importance of risk factor reduction. Patient provided with CHF Zone Management tool and CHF symptoms magnet. Discussed importance of lifestyle changes: encourage daily weights, fluid limitation    PATIENT/CAREGIVER TEACHING:    Level of patient/caregiver understanding able to:   [ x] Verbalize understanding [ ] Demonstrate understanding [ ] Teach back   [ ] Needs reinforcement [ ] Other:       Time spent teachin mins    1. WEIGHT: Admit Weight: (!) 305 lb (138.3 kg)      Today  Weight: 291 lb 12.8 oz (132.4 kg)   2. I/O   Intake/Output Summary (Last 24 hours) at 3/24/2023 1439  Last data filed at 3/24/2023 1208  Gross per 24 hour   Intake 720 ml   Output --   Net 720 ml       Recommendations:   Patient needs one week hospital follow up at discharge  2. Educate further on fluid restriction 48 oz- 64 oz during inpatient stay so she can understand how to measure intake at home. 3. Continue to educate on S/S.   4. Emphasize daily weights, diet, and knowing when and who to call  5. Provided patient with CHF Resource Line for questions and concerns.          MARION JERNIGAN RN 3/24/2023 2:39 PM
LHC/RHC at some point to evaluate for CHF. Patient has a strong family history of coronary artery disease, but also her morbid obesity [BMI of 50] could also be contributing to this. Check ABG to evaluate for hypercapnia. Will empirically start her on BiPAP today. Pancytopenia with hepatosplenomegaly ? RICHEY, hemoglobin around 10-out of proportion to the degree of patient's hypoxia and shortness of breath. Patient will need evaluation and treatment of ALEXANDRU/OHS. Ultimately, patient will benefit from weight loss surgery following completion of cardiac work-up.     Pulmonary will follow    Miquel Villagomez MD
our CHF team    Might benefit from ischemic work up as an outpatient given her risk factors for CAD. The patient was seen for > 80 minutes. I reviewed interval history, physical exam, review of data including labs, imaging, development and implementation of treatment plan and coordination of complex care. More than 50% of the time was devoted to counseling the patient on their diagnoses/treatments, as well as coordination of care with the other care teams        I appreciate the opportunity of cooperating in the care of this patient.     Germain Copeland M.D., [facility]

## 2023-03-25 LAB
ANION GAP SERPL CALCULATED.3IONS-SCNC: 7 MMOL/L (ref 3–16)
BUN SERPL-MCNC: 13 MG/DL (ref 7–20)
CALCIUM SERPL-MCNC: 9.5 MG/DL (ref 8.3–10.6)
CHLORIDE SERPL-SCNC: 94 MMOL/L (ref 99–110)
CO2 SERPL-SCNC: 38 MMOL/L (ref 21–32)
CREAT SERPL-MCNC: 0.6 MG/DL (ref 0.6–1.2)
DEPRECATED RDW RBC AUTO: 14.7 % (ref 12.4–15.4)
GFR SERPLBLD CREATININE-BSD FMLA CKD-EPI: >60 ML/MIN/{1.73_M2}
GLUCOSE BLD-MCNC: 142 MG/DL (ref 70–99)
GLUCOSE BLD-MCNC: 148 MG/DL (ref 70–99)
GLUCOSE BLD-MCNC: 151 MG/DL (ref 70–99)
GLUCOSE SERPL-MCNC: 127 MG/DL (ref 70–99)
HCT VFR BLD AUTO: 36.1 % (ref 36–48)
HGB BLD-MCNC: 11.5 G/DL (ref 12–16)
MAGNESIUM SERPL-MCNC: 2.1 MG/DL (ref 1.8–2.4)
MCH RBC QN AUTO: 29.9 PG (ref 26–34)
MCHC RBC AUTO-ENTMCNC: 31.7 G/DL (ref 31–36)
MCV RBC AUTO: 94.3 FL (ref 80–100)
NT-PROBNP SERPL-MCNC: 33 PG/ML (ref 0–124)
PERFORMED ON: ABNORMAL
PLATELET # BLD AUTO: 109 K/UL (ref 135–450)
PMV BLD AUTO: 9.1 FL (ref 5–10.5)
POTASSIUM SERPL-SCNC: 4.2 MMOL/L (ref 3.5–5.1)
RBC # BLD AUTO: 3.83 M/UL (ref 4–5.2)
S PYO THROAT QL CULT: NORMAL
SODIUM SERPL-SCNC: 139 MMOL/L (ref 136–145)
WBC # BLD AUTO: 3.5 K/UL (ref 4–11)

## 2023-03-25 PROCEDURE — 80048 BASIC METABOLIC PNL TOTAL CA: CPT

## 2023-03-25 PROCEDURE — 85027 COMPLETE CBC AUTOMATED: CPT

## 2023-03-25 PROCEDURE — 6370000000 HC RX 637 (ALT 250 FOR IP): Performed by: INTERNAL MEDICINE

## 2023-03-25 PROCEDURE — 6360000002 HC RX W HCPCS: Performed by: INTERNAL MEDICINE

## 2023-03-25 PROCEDURE — 94761 N-INVAS EAR/PLS OXIMETRY MLT: CPT

## 2023-03-25 PROCEDURE — 83735 ASSAY OF MAGNESIUM: CPT

## 2023-03-25 PROCEDURE — 1200000000 HC SEMI PRIVATE

## 2023-03-25 PROCEDURE — 2580000003 HC RX 258: Performed by: INTERNAL MEDICINE

## 2023-03-25 PROCEDURE — 36415 COLL VENOUS BLD VENIPUNCTURE: CPT

## 2023-03-25 PROCEDURE — 2700000000 HC OXYGEN THERAPY PER DAY

## 2023-03-25 PROCEDURE — 83880 ASSAY OF NATRIURETIC PEPTIDE: CPT

## 2023-03-25 PROCEDURE — 99232 SBSQ HOSP IP/OBS MODERATE 35: CPT | Performed by: NURSE PRACTITIONER

## 2023-03-25 PROCEDURE — 94640 AIRWAY INHALATION TREATMENT: CPT

## 2023-03-25 RX ADMIN — EMPAGLIFLOZIN 10 MG: 10 TABLET, FILM COATED ORAL at 09:00

## 2023-03-25 RX ADMIN — Medication 1 SPRAY: at 10:57

## 2023-03-25 RX ADMIN — LISINOPRIL 30 MG: 20 TABLET ORAL at 09:00

## 2023-03-25 RX ADMIN — ACETAMINOPHEN 650 MG: 325 TABLET ORAL at 20:27

## 2023-03-25 RX ADMIN — ARIPIPRAZOLE 10 MG: 5 TABLET ORAL at 08:59

## 2023-03-25 RX ADMIN — QUETIAPINE FUMARATE 200 MG: 200 TABLET ORAL at 20:27

## 2023-03-25 RX ADMIN — INSULIN GLARGINE 10 UNITS: 100 INJECTION, SOLUTION SUBCUTANEOUS at 20:23

## 2023-03-25 RX ADMIN — Medication 10 ML: at 20:27

## 2023-03-25 RX ADMIN — IRON SUCROSE 200 MG: 20 INJECTION, SOLUTION INTRAVENOUS at 09:08

## 2023-03-25 RX ADMIN — ATORVASTATIN CALCIUM 10 MG: 10 TABLET, FILM COATED ORAL at 20:27

## 2023-03-25 RX ADMIN — QUETIAPINE FUMARATE 100 MG: 100 TABLET ORAL at 09:00

## 2023-03-25 RX ADMIN — KETOROLAC TROMETHAMINE 30 MG: 30 INJECTION, SOLUTION INTRAMUSCULAR; INTRAVENOUS at 06:00

## 2023-03-25 RX ADMIN — Medication 10 ML: at 09:01

## 2023-03-25 RX ADMIN — FUROSEMIDE 80 MG: 10 INJECTION, SOLUTION INTRAMUSCULAR; INTRAVENOUS at 09:00

## 2023-03-25 RX ADMIN — KETOROLAC TROMETHAMINE 30 MG: 30 INJECTION, SOLUTION INTRAMUSCULAR; INTRAVENOUS at 22:52

## 2023-03-25 RX ADMIN — ENOXAPARIN SODIUM 30 MG: 100 INJECTION SUBCUTANEOUS at 08:59

## 2023-03-25 RX ADMIN — KETOROLAC TROMETHAMINE 30 MG: 30 INJECTION, SOLUTION INTRAMUSCULAR; INTRAVENOUS at 14:49

## 2023-03-25 RX ADMIN — SPIRONOLACTONE 12.5 MG: 25 TABLET ORAL at 09:00

## 2023-03-25 RX ADMIN — FERROUS SULFATE TAB 325 MG (65 MG ELEMENTAL FE) 325 MG: 325 (65 FE) TAB at 09:00

## 2023-03-25 RX ADMIN — MONTELUKAST SODIUM 10 MG: 10 TABLET, FILM COATED ORAL at 20:27

## 2023-03-25 RX ADMIN — TIOTROPIUM BROMIDE INHALATION SPRAY 2 PUFF: 3.12 SPRAY, METERED RESPIRATORY (INHALATION) at 08:55

## 2023-03-25 RX ADMIN — FUROSEMIDE 80 MG: 10 INJECTION, SOLUTION INTRAMUSCULAR; INTRAVENOUS at 18:10

## 2023-03-25 RX ADMIN — ENOXAPARIN SODIUM 30 MG: 100 INJECTION SUBCUTANEOUS at 20:27

## 2023-03-25 ASSESSMENT — PAIN DESCRIPTION - DESCRIPTORS
DESCRIPTORS: ACHING

## 2023-03-25 ASSESSMENT — PAIN SCALES - GENERAL
PAINLEVEL_OUTOF10: 6
PAINLEVEL_OUTOF10: 6
PAINLEVEL_OUTOF10: 7
PAINLEVEL_OUTOF10: 6
PAINLEVEL_OUTOF10: 0

## 2023-03-25 ASSESSMENT — PAIN DESCRIPTION - LOCATION
LOCATION: HEAD

## 2023-03-25 NOTE — PLAN OF CARE
Problem: Discharge Planning  Goal: Discharge to home or other facility with appropriate resources  Outcome: Progressing  Flowsheets (Taken 3/25/2023 0809)  Discharge to home or other facility with appropriate resources: Identify barriers to discharge with patient and caregiver     Problem: Safety - Adult  Goal: Free from fall injury  Outcome: Progressing     Problem: Respiratory - Adult  Goal: Achieves optimal ventilation and oxygenation  Outcome: Progressing     Problem: Pain  Goal: Verbalizes/displays adequate comfort level or baseline comfort level  Outcome: Progressing  Flowsheets (Taken 3/25/2023 0809)  Verbalizes/displays adequate comfort level or baseline comfort level: Encourage patient to monitor pain and request assistance     Problem: Chronic Conditions and Co-morbidities  Goal: Patient's chronic conditions and co-morbidity symptoms are monitored and maintained or improved  Recent Flowsheet Documentation  Taken 3/25/2023 0809 by Julia Madrid RN  Care Plan - Patient's Chronic Conditions and Co-Morbidity Symptoms are Monitored and Maintained or Improved: Monitor and assess patient's chronic conditions and comorbid symptoms for stability, deterioration, or improvement

## 2023-03-26 LAB
ANION GAP SERPL CALCULATED.3IONS-SCNC: 10 MMOL/L (ref 3–16)
BACTERIA URNS QL MICRO: ABNORMAL /HPF
BILIRUB UR QL STRIP.AUTO: NEGATIVE
BUN SERPL-MCNC: 22 MG/DL (ref 7–20)
CALCIUM SERPL-MCNC: 9.3 MG/DL (ref 8.3–10.6)
CHLORIDE SERPL-SCNC: 92 MMOL/L (ref 99–110)
CLARITY UR: ABNORMAL
CO2 SERPL-SCNC: 35 MMOL/L (ref 21–32)
COLOR UR: YELLOW
CREAT SERPL-MCNC: 0.9 MG/DL (ref 0.6–1.2)
DEPRECATED RDW RBC AUTO: 14.5 % (ref 12.4–15.4)
EPI CELLS #/AREA URNS AUTO: 4 /HPF (ref 0–5)
GFR SERPLBLD CREATININE-BSD FMLA CKD-EPI: >60 ML/MIN/{1.73_M2}
GLUCOSE BLD-MCNC: 124 MG/DL (ref 70–99)
GLUCOSE BLD-MCNC: 144 MG/DL (ref 70–99)
GLUCOSE BLD-MCNC: 150 MG/DL (ref 70–99)
GLUCOSE BLD-MCNC: 183 MG/DL (ref 70–99)
GLUCOSE SERPL-MCNC: 132 MG/DL (ref 70–99)
GLUCOSE UR STRIP.AUTO-MCNC: >=1000 MG/DL
HCT VFR BLD AUTO: 33.5 % (ref 36–48)
HGB BLD-MCNC: 10.8 G/DL (ref 12–16)
HGB UR QL STRIP.AUTO: ABNORMAL
HYALINE CASTS #/AREA URNS AUTO: 1 /LPF (ref 0–8)
KETONES UR STRIP.AUTO-MCNC: NEGATIVE MG/DL
LEUKOCYTE ESTERASE UR QL STRIP.AUTO: ABNORMAL
MCH RBC QN AUTO: 30.2 PG (ref 26–34)
MCHC RBC AUTO-ENTMCNC: 32.3 G/DL (ref 31–36)
MCV RBC AUTO: 93.6 FL (ref 80–100)
NITRITE UR QL STRIP.AUTO: NEGATIVE
PERFORMED ON: ABNORMAL
PH UR STRIP.AUTO: 5 [PH] (ref 5–8)
PLATELET # BLD AUTO: 113 K/UL (ref 135–450)
PMV BLD AUTO: 9 FL (ref 5–10.5)
POTASSIUM SERPL-SCNC: 3.8 MMOL/L (ref 3.5–5.1)
PROT UR STRIP.AUTO-MCNC: NEGATIVE MG/DL
RBC # BLD AUTO: 3.58 M/UL (ref 4–5.2)
RBC CLUMPS #/AREA URNS AUTO: 14 /HPF (ref 0–4)
SODIUM SERPL-SCNC: 137 MMOL/L (ref 136–145)
SP GR UR STRIP.AUTO: 1.01 (ref 1–1.03)
UA COMPLETE W REFLEX CULTURE PNL UR: YES
UA DIPSTICK W REFLEX MICRO PNL UR: YES
URN SPEC COLLECT METH UR: ABNORMAL
UROBILINOGEN UR STRIP-ACNC: 0.2 E.U./DL
WBC # BLD AUTO: 4.4 K/UL (ref 4–11)
WBC #/AREA URNS AUTO: 66 /HPF (ref 0–5)

## 2023-03-26 PROCEDURE — 6370000000 HC RX 637 (ALT 250 FOR IP): Performed by: INTERNAL MEDICINE

## 2023-03-26 PROCEDURE — 81001 URINALYSIS AUTO W/SCOPE: CPT

## 2023-03-26 PROCEDURE — 6360000002 HC RX W HCPCS: Performed by: INTERNAL MEDICINE

## 2023-03-26 PROCEDURE — 85027 COMPLETE CBC AUTOMATED: CPT

## 2023-03-26 PROCEDURE — 87086 URINE CULTURE/COLONY COUNT: CPT

## 2023-03-26 PROCEDURE — 94640 AIRWAY INHALATION TREATMENT: CPT

## 2023-03-26 PROCEDURE — 36415 COLL VENOUS BLD VENIPUNCTURE: CPT

## 2023-03-26 PROCEDURE — 2700000000 HC OXYGEN THERAPY PER DAY

## 2023-03-26 PROCEDURE — 94760 N-INVAS EAR/PLS OXIMETRY 1: CPT

## 2023-03-26 PROCEDURE — 2580000003 HC RX 258: Performed by: INTERNAL MEDICINE

## 2023-03-26 PROCEDURE — 80048 BASIC METABOLIC PNL TOTAL CA: CPT

## 2023-03-26 PROCEDURE — 99232 SBSQ HOSP IP/OBS MODERATE 35: CPT | Performed by: NURSE PRACTITIONER

## 2023-03-26 PROCEDURE — 6370000000 HC RX 637 (ALT 250 FOR IP): Performed by: NURSE PRACTITIONER

## 2023-03-26 PROCEDURE — 1200000000 HC SEMI PRIVATE

## 2023-03-26 RX ORDER — FUROSEMIDE 40 MG/1
40 TABLET ORAL 2 TIMES DAILY
Status: DISCONTINUED | OUTPATIENT
Start: 2023-03-26 | End: 2023-03-27 | Stop reason: HOSPADM

## 2023-03-26 RX ORDER — SPIRONOLACTONE 25 MG/1
12.5 TABLET ORAL DAILY
Qty: 30 TABLET | Refills: 3 | Status: SHIPPED | OUTPATIENT
Start: 2023-03-27

## 2023-03-26 RX ORDER — CIPROFLOXACIN 2 MG/ML
400 INJECTION, SOLUTION INTRAVENOUS EVERY 12 HOURS
Status: DISCONTINUED | OUTPATIENT
Start: 2023-03-26 | End: 2023-03-26

## 2023-03-26 RX ORDER — LEVOFLOXACIN 5 MG/ML
750 INJECTION, SOLUTION INTRAVENOUS EVERY 24 HOURS
Status: DISCONTINUED | OUTPATIENT
Start: 2023-03-26 | End: 2023-03-27 | Stop reason: HOSPADM

## 2023-03-26 RX ORDER — FUROSEMIDE 40 MG/1
40 TABLET ORAL 2 TIMES DAILY
Qty: 60 TABLET | Refills: 3 | Status: SHIPPED | OUTPATIENT
Start: 2023-03-26

## 2023-03-26 RX ADMIN — Medication 10 ML: at 20:45

## 2023-03-26 RX ADMIN — ENOXAPARIN SODIUM 30 MG: 100 INJECTION SUBCUTANEOUS at 20:45

## 2023-03-26 RX ADMIN — ACETAMINOPHEN 650 MG: 325 TABLET ORAL at 08:56

## 2023-03-26 RX ADMIN — FUROSEMIDE 40 MG: 40 TABLET ORAL at 17:18

## 2023-03-26 RX ADMIN — ATORVASTATIN CALCIUM 10 MG: 10 TABLET, FILM COATED ORAL at 20:45

## 2023-03-26 RX ADMIN — LEVOFLOXACIN 750 MG: 5 INJECTION, SOLUTION INTRAVENOUS at 17:20

## 2023-03-26 RX ADMIN — FUROSEMIDE 40 MG: 40 TABLET ORAL at 08:57

## 2023-03-26 RX ADMIN — ENOXAPARIN SODIUM 30 MG: 100 INJECTION SUBCUTANEOUS at 08:58

## 2023-03-26 RX ADMIN — INSULIN GLARGINE 10 UNITS: 100 INJECTION, SOLUTION SUBCUTANEOUS at 20:46

## 2023-03-26 RX ADMIN — ARIPIPRAZOLE 10 MG: 5 TABLET ORAL at 08:57

## 2023-03-26 RX ADMIN — ACETAMINOPHEN 650 MG: 325 TABLET ORAL at 19:06

## 2023-03-26 RX ADMIN — TIOTROPIUM BROMIDE INHALATION SPRAY 2 PUFF: 3.12 SPRAY, METERED RESPIRATORY (INHALATION) at 07:49

## 2023-03-26 RX ADMIN — SPIRONOLACTONE 12.5 MG: 25 TABLET ORAL at 08:57

## 2023-03-26 RX ADMIN — MONTELUKAST SODIUM 10 MG: 10 TABLET, FILM COATED ORAL at 20:45

## 2023-03-26 RX ADMIN — KETOROLAC TROMETHAMINE 30 MG: 30 INJECTION, SOLUTION INTRAMUSCULAR; INTRAVENOUS at 15:24

## 2023-03-26 RX ADMIN — QUETIAPINE FUMARATE 100 MG: 100 TABLET ORAL at 08:58

## 2023-03-26 RX ADMIN — FERROUS SULFATE TAB 325 MG (65 MG ELEMENTAL FE) 325 MG: 325 (65 FE) TAB at 08:57

## 2023-03-26 RX ADMIN — QUETIAPINE FUMARATE 200 MG: 200 TABLET ORAL at 20:45

## 2023-03-26 RX ADMIN — EMPAGLIFLOZIN 10 MG: 10 TABLET, FILM COATED ORAL at 08:57

## 2023-03-26 RX ADMIN — LISINOPRIL 30 MG: 20 TABLET ORAL at 08:56

## 2023-03-26 RX ADMIN — Medication 10 ML: at 08:57

## 2023-03-26 ASSESSMENT — PAIN DESCRIPTION - ORIENTATION
ORIENTATION: MID
ORIENTATION: MID

## 2023-03-26 ASSESSMENT — PAIN DESCRIPTION - LOCATION
LOCATION: HEAD
LOCATION: HEAD

## 2023-03-26 ASSESSMENT — PAIN DESCRIPTION - DIRECTION: RADIATING_TOWARDS: PAIN

## 2023-03-26 ASSESSMENT — PAIN SCALES - GENERAL
PAINLEVEL_OUTOF10: 6
PAINLEVEL_OUTOF10: 7
PAINLEVEL_OUTOF10: 7

## 2023-03-26 ASSESSMENT — PAIN DESCRIPTION - DESCRIPTORS
DESCRIPTORS: ACHING
DESCRIPTORS: ACHING

## 2023-03-26 ASSESSMENT — PAIN DESCRIPTION - PAIN TYPE
TYPE: ACUTE PAIN
TYPE: ACUTE PAIN

## 2023-03-26 NOTE — PLAN OF CARE
Problem: Discharge Planning  Goal: Discharge to home or other facility with appropriate resources  3/25/2023 2346 by Johana Barroso RN  Outcome: Progressing    Problem: Cardiovascular - Adult  Goal: Maintains optimal cardiac output and hemodynamic stability  Outcome: Progressing  IV Lasix BID. I&O. Daily weights. CHF education 10 mins. Cardiology and CHF team following. Oncology following. IV iron. Daily labs monitoring CBC and BMP. Problem: Safety - Adult  Goal: Free from fall injury  3/25/2023 2346 by Johana Barroso RN  Outcome: Progressing     Problem: Respiratory - Adult  Goal: Achieves optimal ventilation and oxygenation  3/25/2023 2346 by Johana Barroso RN  Outcome: Progressing  Supplemental oxygen 2L. Pulmonology following.      Problem: Chronic Conditions and Co-morbidities  Goal: Patient's chronic conditions and co-morbidity symptoms are monitored and maintained or improved  Outcome: Progressing     Problem: Pain  Goal: Verbalizes/displays adequate comfort level or baseline comfort level  3/25/2023 2346 by Johana Barroso RN  Outcome: Progressing

## 2023-03-26 NOTE — RT PROTOCOL NOTE
RT Inhaler-Nebulizer Bronchodilator Protocol Note    There is a bronchodilator order in the chart from a provider indicating to follow the RT Bronchodilator Protocol and there is an Initiate RT Inhaler-Nebulizer Bronchodilator Protocol order as well (see protocol at bottom of note). CXR Findings:  No results found. The findings from the last RT Protocol Assessment were as follows:   History Pulmonary Disease: Chronic pulmonary disease  Respiratory Pattern: Regular pattern and RR 12-20 bpm  Breath Sounds: Clear breath sounds  Cough: Strong, spontaneous, non-productive  Indication for Bronchodilator Therapy: Decreased or absent breath sounds  Bronchodilator Assessment Score: 2    Aerosolized bronchodilator medication orders have been revised according to the RT Inhaler-Nebulizer Bronchodilator Protocol below. Respiratory Therapist to perform RT Therapy Protocol Assessment initially then follow the protocol. Repeat RT Therapy Protocol Assessment PRN for score 0-3 or on second treatment, BID, and PRN for scores above 3. No Indications - adjust the frequency to every 6 hours PRN wheezing or bronchospasm, if no treatments needed after 48 hours then discontinue using Per Protocol order mode. If indication present, adjust the RT bronchodilator orders based on the Bronchodilator Assessment Score as indicated below. Use Inhaler orders unless patient has one or more of the following: on home nebulizer, not able to hold breath for 10 seconds, is not alert and oriented, cannot activate and use MDI correctly, or respiratory rate 25 breaths per minute or more, then use the equivalent nebulizer order(s) with same Frequency and PRN reasons based on the score. If a patient is on this medication at home then do not decrease Frequency below that used at home.     0-3 - enter or revise RT bronchodilator order(s) to equivalent RT Bronchodilator order with Frequency of every 4 hours PRN for wheezing or increased work
RT Nebulizer Bronchodilator Protocol Note    There is a bronchodilator order in the chart from a provider indicating to follow the RT Bronchodilator Protocol and there is an Initiate RT Bronchodilator Protocol order as well (see protocol at bottom of note). CXR Findings:  No results found. The findings from the last RT Protocol Assessment were as follows:  Smoking: Chronic pulmonary disease  Respiratory Pattern: Regular pattern and RR 12-20 bpm  Breath Sounds: Clear breath sounds  Cough: Strong, spontaneous, non-productive  Indication for Bronchodilator Therapy:    Bronchodilator Assessment Score: 2    Aerosolized bronchodilator medication orders have been revised according to the RT Nebulizer Bronchodilator Protocol below. Respiratory Therapist to perform RT Therapy Protocol Assessment initially then follow the protocol. Repeat RT Therapy Protocol Assessment PRN for score 0-3 or on second treatment, BID, and PRN for scores above 3. No Indications - adjust the frequency to every 6 hours PRN wheezing or bronchospasm, if no treatments needed after 48 hours then discontinue using Per Protocol order mode. If indication present, adjust the RT bronchodilator orders based on the Bronchodilator Assessment Score as indicated below. If a patient is on this medication at home then do not decrease Frequency below that used at home. 0-3 - enter or revise RT bronchodilator order(s) to equivalent RT Bronchodilator order with Frequency of every 4 hours PRN for wheezing or increased work of breathing using Per Protocol order mode. 4-6 - enter or revise RT Bronchodilator order(s) to two equivalent RT bronchodilator orders with one order with BID Frequency and one order with Frequency of every 4 hours PRN wheezing or increased work of breathing using Per Protocol order mode.          7-10 - enter or revise RT Bronchodilator order(s) to two equivalent RT bronchodilator orders with one order with TID
hours PRN for wheezing or increased work of breathing using Per Protocol order mode. 4-6 - enter or revise RT Bronchodilator order(s) to two equivalent RT bronchodilator orders with one order with BID Frequency and one order with Frequency of every 4 hours PRN wheezing or increased work of breathing using Per Protocol order mode. 7-10 - enter or revise RT Bronchodilator order(s) to two equivalent RT bronchodilator orders with one order with TID Frequency and one order with Frequency of every 4 hours PRN wheezing or increased work of breathing using Per Protocol order mode. 11-13 - enter or revise RT Bronchodilator order(s) to one equivalent RT bronchodilator order with QID Frequency and an Albuterol order with Frequency of every 4 hours PRN wheezing or increased work of breathing using Per Protocol order mode. Greater than 13 - enter or revise RT Bronchodilator order(s) to one equivalent RT bronchodilator order with every 4 hours Frequency and an Albuterol order with Frequency of every 2 hours PRN wheezing or increased work of breathing using Per Protocol order mode. RT to enter RT Home Evaluation for COPD & MDI Assessment order using Per Protocol order mode.     Electronically signed by Caren Hinton RCP on 3/20/2023 at 11:06 AM

## 2023-03-27 VITALS
DIASTOLIC BLOOD PRESSURE: 79 MMHG | HEART RATE: 82 BPM | HEIGHT: 61 IN | WEIGHT: 286.82 LBS | OXYGEN SATURATION: 97 % | SYSTOLIC BLOOD PRESSURE: 144 MMHG | RESPIRATION RATE: 18 BRPM | BODY MASS INDEX: 54.15 KG/M2 | TEMPERATURE: 98.1 F

## 2023-03-27 PROBLEM — I10 PRIMARY HYPERTENSION: Status: ACTIVE | Noted: 2023-03-27

## 2023-03-27 LAB
BACTERIA UR CULT: NORMAL
DEPRECATED RDW RBC AUTO: 14.8 % (ref 12.4–15.4)
GLUCOSE BLD-MCNC: 111 MG/DL (ref 70–99)
GLUCOSE BLD-MCNC: 134 MG/DL (ref 70–99)
GLUCOSE BLD-MCNC: 174 MG/DL (ref 70–99)
HCT VFR BLD AUTO: 33.4 % (ref 36–48)
HGB BLD-MCNC: 10.8 G/DL (ref 12–16)
MCH RBC QN AUTO: 30.1 PG (ref 26–34)
MCHC RBC AUTO-ENTMCNC: 32.2 G/DL (ref 31–36)
MCV RBC AUTO: 93.4 FL (ref 80–100)
PERFORMED ON: ABNORMAL
PLATELET # BLD AUTO: 113 K/UL (ref 135–450)
PMV BLD AUTO: 9.4 FL (ref 5–10.5)
RBC # BLD AUTO: 3.57 M/UL (ref 4–5.2)
WBC # BLD AUTO: 4.3 K/UL (ref 4–11)

## 2023-03-27 PROCEDURE — 94761 N-INVAS EAR/PLS OXIMETRY MLT: CPT

## 2023-03-27 PROCEDURE — 94640 AIRWAY INHALATION TREATMENT: CPT

## 2023-03-27 PROCEDURE — 99231 SBSQ HOSP IP/OBS SF/LOW 25: CPT | Performed by: NURSE PRACTITIONER

## 2023-03-27 PROCEDURE — 6370000000 HC RX 637 (ALT 250 FOR IP): Performed by: INTERNAL MEDICINE

## 2023-03-27 PROCEDURE — 97116 GAIT TRAINING THERAPY: CPT

## 2023-03-27 PROCEDURE — 85027 COMPLETE CBC AUTOMATED: CPT

## 2023-03-27 PROCEDURE — 99233 SBSQ HOSP IP/OBS HIGH 50: CPT | Performed by: INTERNAL MEDICINE

## 2023-03-27 PROCEDURE — 6360000002 HC RX W HCPCS: Performed by: INTERNAL MEDICINE

## 2023-03-27 PROCEDURE — 6370000000 HC RX 637 (ALT 250 FOR IP): Performed by: NURSE PRACTITIONER

## 2023-03-27 PROCEDURE — 2580000003 HC RX 258: Performed by: INTERNAL MEDICINE

## 2023-03-27 PROCEDURE — 2700000000 HC OXYGEN THERAPY PER DAY

## 2023-03-27 RX ORDER — CIPROFLOXACIN 500 MG/1
500 TABLET, FILM COATED ORAL 2 TIMES DAILY
Qty: 10 TABLET | Refills: 0 | Status: SHIPPED | OUTPATIENT
Start: 2023-03-27 | End: 2023-04-01

## 2023-03-27 RX ADMIN — EMPAGLIFLOZIN 10 MG: 10 TABLET, FILM COATED ORAL at 08:17

## 2023-03-27 RX ADMIN — FUROSEMIDE 40 MG: 40 TABLET ORAL at 08:17

## 2023-03-27 RX ADMIN — Medication 10 ML: at 08:18

## 2023-03-27 RX ADMIN — ENOXAPARIN SODIUM 30 MG: 100 INJECTION SUBCUTANEOUS at 08:16

## 2023-03-27 RX ADMIN — FERROUS SULFATE TAB 325 MG (65 MG ELEMENTAL FE) 325 MG: 325 (65 FE) TAB at 08:17

## 2023-03-27 RX ADMIN — ACETAMINOPHEN 650 MG: 325 TABLET ORAL at 08:17

## 2023-03-27 RX ADMIN — LISINOPRIL 30 MG: 20 TABLET ORAL at 08:17

## 2023-03-27 RX ADMIN — FUROSEMIDE 40 MG: 40 TABLET ORAL at 16:39

## 2023-03-27 RX ADMIN — QUETIAPINE FUMARATE 100 MG: 100 TABLET ORAL at 08:18

## 2023-03-27 RX ADMIN — KETOROLAC TROMETHAMINE 30 MG: 30 INJECTION, SOLUTION INTRAMUSCULAR; INTRAVENOUS at 16:39

## 2023-03-27 RX ADMIN — SPIRONOLACTONE 12.5 MG: 25 TABLET ORAL at 08:16

## 2023-03-27 RX ADMIN — TIOTROPIUM BROMIDE INHALATION SPRAY 2 PUFF: 3.12 SPRAY, METERED RESPIRATORY (INHALATION) at 07:46

## 2023-03-27 RX ADMIN — KETOROLAC TROMETHAMINE 30 MG: 30 INJECTION, SOLUTION INTRAMUSCULAR; INTRAVENOUS at 01:55

## 2023-03-27 RX ADMIN — ARIPIPRAZOLE 10 MG: 5 TABLET ORAL at 08:16

## 2023-03-27 RX ADMIN — SODIUM CHLORIDE, PRESERVATIVE FREE 10 ML: 5 INJECTION INTRAVENOUS at 01:56

## 2023-03-27 ASSESSMENT — PAIN DESCRIPTION - LOCATION: LOCATION: HEAD

## 2023-03-27 ASSESSMENT — PAIN SCALES - GENERAL
PAINLEVEL_OUTOF10: 7
PAINLEVEL_OUTOF10: 6
PAINLEVEL_OUTOF10: 6
PAINLEVEL_OUTOF10: 7

## 2023-03-27 ASSESSMENT — PAIN DESCRIPTION - DESCRIPTORS: DESCRIPTORS: ACHING

## 2023-03-27 NOTE — PLAN OF CARE
Problem: Discharge Planning  Goal: Discharge to home or other facility with appropriate resources  Outcome: Completed     Problem: Safety - Adult  Goal: Free from fall injury  Outcome: Completed     Problem: Respiratory - Adult  Goal: Achieves optimal ventilation and oxygenation  Outcome: Completed     Problem: Cardiovascular - Adult  Goal: Maintains optimal cardiac output and hemodynamic stability  Outcome: Completed     Problem: Chronic Conditions and Co-morbidities  Goal: Patient's chronic conditions and co-morbidity symptoms are monitored and maintained or improved  Outcome: Completed     Problem: Pain  Goal: Verbalizes/displays adequate comfort level or baseline comfort level  Outcome: Completed

## 2023-03-27 NOTE — CARE COORDINATION
SW reviewed pt's chart for discharge planning. Patient was evaluated by PT/OT who did not have any recommendations for continuing therapy or DME. Patient is currently on oxygen here at 2 liters. Merna wilson/Thelma who reported pt is already active with their home oxygen services at 2 liters. Pt will just need to have a family member bring a portable tank or portable concentrator at discharge. No other d/c needs identified at this time. Discharge Plan:  Home w/continuing home oxygen.     Electronically signed by JOHN Miguel LSW on 3/27/2023 at 4:11 PM

## 2023-03-27 NOTE — PROGRESS NOTES
03/20/23 1105   RT Protocol   History Pulmonary Disease 2   Respiratory pattern 0   Breath sounds 0   Cough 0   Bronchodilator Assessment Score 2
03/22/23 1108   Resting (Room Air)   SpO2 86   HR 86   Resting (On O2)   SpO2 94   HR 86   O2 Device Nasal cannula   O2 Flow Rate (l/min) 2 l/min   During Walk (On O2)   SpO2 90   HR 98   O2 Device Nasal cannula   O2 Flow Rate (l/min) 2 l/min   Rate of Dyspnea 2   After Walk   SpO2 92   HR 88   O2 Device Nasal cannula   O2 Flow Rate (l/min) 2 l/min   Rate of Dyspnea 1   Does the Patient Qualify for Home O2 Yes   Liter Flow at Rest 2   Liter Flow on Exertion 2   Does the Patient Need Portable Oxygen Tanks Yes     Electronically signed by Mona Phelps RCP on 3/22/2023 at 11:09 AM
03/26/23 0755   RT Protocol   History Pulmonary Disease 2   Respiratory pattern 0   Breath sounds 0   Cough 0   Indications for Bronchodilator Therapy Decreased or absent breath sounds   Bronchodilator Assessment Score 2
4 Eyes Skin Assessment     NAME:  Josephine Leija  YOB: 1950  MEDICAL RECORD NUMBER:  [id number]    The patient is being assessed for  Admission    I agree that One RN has performed a thorough Head to Toe Skin Assessment on the patient. ALL assessment sites listed below have been assessed. Areas assessed by both nurses:    Head, Face, Ears, Shoulders, Back, Chest, Arms, Elbows, Hands, Sacrum. Buttock, Coccyx, Ischium, and Legs. Feet and Heels        Does the Patient have a Wound?  No noted wound(s)       Gene Prevention initiated by RN: Yes   Wound Care Orders initiated by RN: NA    Pressure Injury (Stage 3,4, Unstageable, DTI, NWPT, and Complex wounds) if present, place referral order by RN under : NA    New and Established Ostomies, if present place, referral order under : NA      Nurse 1 eSignature: Electronically signed by Donna Shaw RN on 3/20/23 at 7:25 PM EDT    **SHARE this note so that the co-signing nurse can place an eSignature**    Nurse 2 eSignature: Electronically signed by Judith Cordova RN on 3/21/23 at 2:34 AM EDT
[address]   Cardiology Progress Note     Date: 3/25/2023  Admit Date: 3/20/2023     Reason for consultation: shortness of breath, dHF    Chief Complaint:   Chief Complaint   Patient presents with    Shortness of Breath     Patient was brought in by [facility] due to having shortness of breath and trouble breathing. Per patent, this has been going on for 4 days. Patient has a history of asthma, copd and chf.        History of Present Illness: History obtained from patient and medical record. Meghan Bland is a 81 y.o. female with a past medical history of PMH of HTN, HFpEF, hyperlipidemia, diabetes, prior history of PE, morbid obesity, and ALEXANDRU who presented with shortness of breath and hypoxia. She normally uses supplemental oxygen only at night but for the last 3-4 days, she has been using the oxygen around the clock as she has been desaturating with minimal exertion. She recently saw Dr. Tanner Carvajal and underwent PFTs showing restrictive lung disease with reduced diffusion capacity. She was due to see cardiology next week in the office. Interval Hx: Today, she says she is very anxious to get home. Continues to complain of shortness of breath with minimal exertion. Patient seen and examined. Clinical notes reviewed. Telemetry reviewed. No new complaints today. No major events overnight. Denies having chest pain, palpitations, shortness of breath, orthopnea/PND, cough, or dizziness at the time of this visit. Allergies: Allergies   Allergen Reactions    Penicillins Swelling       Home Meds:  Prior to Visit Medications    Medication Sig Taking?  Authorizing Provider   metFORMIN (GLUCOPHAGE) 500 MG tablet Take 1,000 mg by mouth daily (with breakfast)  Historical Provider, MD   ferrous sulfate (IRON 325) 325 (65 Fe) MG tablet Take 325 mg by mouth daily (with breakfast)  Historical Provider, MD   vitamin B-12 (CYANOCOBALAMIN) 100 MCG tablet Take 50 mcg by mouth daily  Historical
Definity given per IV per echo protocol. Pt tolerated well.
HOSPITALISTS PROGRESS NOTE    3/21/2023 8:45 AM        Name: Harley Harding . Admitted: 3/20/2023  Primary Care Provider: Janeen Logan MD (Tel: [phone])      Brief Course: 81 y.o. female  with PMHx of hypertension, HFpEF hyperlipidemia, diabetes mellitus, prior history of PE, morbid obesity and ALEXANDRU presented with worsening shortness of breath and hypoxia. Of note, patient was recently seen by pulmonologist; underwent PFTs which showed restrictive lung disease with reduced diffusion capacity and referred her to Dr. Yas Wood for cardiac cath. Initial diagnostic lab work showed proBNP of 416. CTPA -ve for PE or any other acute pulmonary pathology. Interval history:   Pt seen and examined today   Overnight events noted and interval ancillary notes reviewed. On 2 L nasal cannula satting around 91%  Afebrile overnight, WBC 3.2K. Respiratory molecular panel positive for coronavirus 229 E  Reported she is not feeling better; endorsed generalized body aches& pains and shortness of breath  Echo pending. PT/OT pending  Plan for discharge tomorrow medical stable. Will need home O2 eval      Assessment & Plan:     Shortness of breath: Likely  2/2 coronavirus 2/20/1990  CTPA negative for PE or any acute pulmonary pathology  Last echo on 11/22 showed EF of 55 to 60%. Recent PFT suggestive of restrictive lung disease with reduced diffusion capacity  echo ordered. Continue IV diuresis     Hypertension; continue home dose of lisinopril and atenolol. Monitor BP closely     Hx of diabetes mellitus; hold oral antidiabetic medication. Hemoglobin A1c ordered  Continue Lantus and SSI monitor blood glucose closely    Pancytopenia; chronic. Follows with oncology  Monitor CBC       Morbid obesity; BMI 52.44.   Counseled on weight loss and healthy lifestyle modification     Hx of ALEXANDRU    DVT PPX: Lovenox  Code:Full Code    Disposition: Once
Hospitalist Progress Note      PCP: Ev Dougherty MD    Date of Admission: 3/20/2023    Chief Complaint: Dyspnea      Subjective:   Dyspnea improving. Some generalized weakness and intermittent cough. Otherwise denies new acute complaints. Medications:  Reviewed    Infusion Medications    sodium chloride      dextrose       Scheduled Medications    furosemide  40 mg IntraVENous Daily    sodium chloride flush  5-40 mL IntraVENous 2 times per day    enoxaparin  30 mg SubCUTAneous BID    insulin lispro  0-4 Units SubCUTAneous TID WC    insulin lispro  0-4 Units SubCUTAneous Nightly    ferrous sulfate  325 mg Oral Daily with breakfast    lisinopril  30 mg Oral Daily    montelukast  10 mg Oral Nightly    tiotropium  2 puff Inhalation Daily    insulin glargine  10 Units SubCUTAneous Nightly    QUEtiapine  100 mg Oral Daily    QUEtiapine  200 mg Oral Nightly    ARIPiprazole  10 mg Oral Daily    atorvastatin  10 mg Oral Nightly     PRN Meds: sodium chloride flush, sodium chloride, ondansetron **OR** ondansetron, polyethylene glycol, acetaminophen **OR** acetaminophen, potassium chloride **OR** potassium alternative oral replacement **OR** potassium chloride, magnesium sulfate, glucose, dextrose bolus **OR** dextrose bolus, glucagon (rDNA), dextrose, albuterol sulfate HFA, ipratropium-albuterol    No intake or output data in the 24 hours ending 03/23/23 1612    Exam:    /82   Pulse 84   Temp 97.8 °F (36.6 °C) (Oral)   Resp 18   Ht 5' 1\" (1.549 m)   Wt 277 lb 9 oz (125.9 kg)   SpO2 91%   BMI 52.44 kg/m²     Gen/overall appearance: Not in acute distress. Alert. Head: Normocephalic, atraumatic  Eyes: EOMI, no scleral icterus  CVS: regular rate and rhythm, Normal S1S2  Pulm: Diminished. No crackles/wheezes  Gastrointestinal: Soft, nontender, nondistended, no guarding or rebound  Extremities: No edema.  No erythema or warmth  Neuro: No gross focal deficits noted  Skin: Warm, dry    Labs:   Recent Labs
Hospitalist Progress Note      PCP: Klaudia Stewart MD    Date of Admission: 3/20/2023    Chief Complaint: Dyspnea      Subjective:   Dyspnea intermittent. Worse with exertion  Otherwise denies new acute complaints. Medications:  Reviewed    Infusion Medications    sodium chloride      dextrose       Scheduled Medications    furosemide  80 mg IntraVENous BID    spironolactone  12.5 mg Oral Daily    empagliflozin  10 mg Oral Daily    sodium chloride flush  5-40 mL IntraVENous 2 times per day    enoxaparin  30 mg SubCUTAneous BID    insulin lispro  0-4 Units SubCUTAneous TID WC    insulin lispro  0-4 Units SubCUTAneous Nightly    ferrous sulfate  325 mg Oral Daily with breakfast    lisinopril  30 mg Oral Daily    montelukast  10 mg Oral Nightly    tiotropium  2 puff Inhalation Daily    insulin glargine  10 Units SubCUTAneous Nightly    QUEtiapine  100 mg Oral Daily    QUEtiapine  200 mg Oral Nightly    ARIPiprazole  10 mg Oral Daily    atorvastatin  10 mg Oral Nightly     PRN Meds: sodium chloride, ketorolac, sodium chloride flush, sodium chloride, ondansetron **OR** ondansetron, polyethylene glycol, acetaminophen **OR** acetaminophen, potassium chloride **OR** potassium alternative oral replacement **OR** potassium chloride, magnesium sulfate, glucose, dextrose bolus **OR** dextrose bolus, glucagon (rDNA), dextrose, albuterol sulfate HFA, ipratropium-albuterol      Intake/Output Summary (Last 24 hours) at 3/25/2023 1254  Last data filed at 3/24/2023 1850  Gross per 24 hour   Intake 240 ml   Output --   Net 240 ml         Exam:    /82   Pulse 88   Temp 98.1 °F (36.7 °C) (Oral)   Resp 18   Ht 5' 1\" (1.549 m)   Wt 292 lb (132.5 kg)   SpO2 93%   BMI 55.17 kg/m²     Gen/overall appearance: Not in acute distress. Alert. Head: Normocephalic, atraumatic  Eyes: EOMI, no scleral icterus  CVS: regular rate and rhythm, Normal S1S2  Pulm: Diminished. No crackles/wheezes  Extremities: trace edema.  No
Hospitalist Progress Note      PCP: Mayra Poole MD    Date of Admission: 3/20/2023    Chief Complaint: Dyspnea      Subjective:   Dyspnea intermittent. Some generalized weakness and cough at times  Otherwise denies new acute complaints. Medications:  Reviewed    Infusion Medications    sodium chloride      dextrose       Scheduled Medications    iron sucrose (VENOFER) iv piggyback 100 mL  200 mg IntraVENous Q24H    furosemide  80 mg IntraVENous BID    spironolactone  12.5 mg Oral Daily    empagliflozin  10 mg Oral Daily    sodium chloride flush  5-40 mL IntraVENous 2 times per day    enoxaparin  30 mg SubCUTAneous BID    insulin lispro  0-4 Units SubCUTAneous TID WC    insulin lispro  0-4 Units SubCUTAneous Nightly    ferrous sulfate  325 mg Oral Daily with breakfast    lisinopril  30 mg Oral Daily    montelukast  10 mg Oral Nightly    tiotropium  2 puff Inhalation Daily    insulin glargine  10 Units SubCUTAneous Nightly    QUEtiapine  100 mg Oral Daily    QUEtiapine  200 mg Oral Nightly    ARIPiprazole  10 mg Oral Daily    atorvastatin  10 mg Oral Nightly     PRN Meds: ketorolac, sodium chloride flush, sodium chloride, ondansetron **OR** ondansetron, polyethylene glycol, acetaminophen **OR** acetaminophen, potassium chloride **OR** potassium alternative oral replacement **OR** potassium chloride, magnesium sulfate, glucose, dextrose bolus **OR** dextrose bolus, glucagon (rDNA), dextrose, albuterol sulfate HFA, ipratropium-albuterol      Intake/Output Summary (Last 24 hours) at 3/24/2023 1504  Last data filed at 3/24/2023 1208  Gross per 24 hour   Intake 720 ml   Output --   Net 720 ml       Exam:    BP (!) 146/81   Pulse (!) 102   Temp 97.9 °F (36.6 °C) (Oral)   Resp 20   Ht 5' 1\" (1.549 m)   Wt 291 lb 12.8 oz (132.4 kg)   SpO2 97%   BMI 55.14 kg/m²     Gen/overall appearance: Not in acute distress. Alert.   Head: Normocephalic, atraumatic  Eyes: EOMI, no scleral icterus  CVS: regular rate and
[facility] Department   Phone: [phone]    Physical Therapy    [] Initial Evaluation            [x] Daily Treatment Note         [] Discharge Summary      Patient: Jm Washburn   : 1950   MRN: [id number]   Date of Service:  3/27/2023  Admitting Diagnosis: CHF (congestive heart failure), NYHA class I, acute on chronic, combined ([facility] [address]  Current Admission Summary: Pt. Presents with acute on chronic CHF. Negative for PE. Covid and C-diff pending. Past Medical History:  has a past medical history of Asthma, Back pain, Bipolar 1 disorder ([facility] [address] [facility] GERD (gastroesophageal reflux disease), Hyperlipidemia, Hypertension, and Tachycardia. Past Surgical History:  has a past surgical history that includes Cholecystectomy; Leg Surgery; Appendectomy; and Endometrial ablation. Discharge Recommendations: Jm Washburn scored a 23/24 on the AM-PAC short mobility form. At this time, no further PT is recommended upon discharge due to being steady with mobility. Recommend patient returns to prior setting with prior services.      DME Required For Discharge: no DME required at discharge  Precautions/Restrictions: high fall risk, up ad lee  Weight Bearing Restrictions: no restrictions  [] Right Upper Extremity  [] Left Upper Extremity [] Right Lower Extremity  [] Left Lower Extremity     Required Braces/Orthotics: no braces required   [] Right  [] Left  Positional Restrictions:no positional restrictions    Pre-Admission Information   Lives With: family, Sister -in-law     Type of Home: house  Home Layout: one level, finished basement  Home Access: level entry  Bathroom Layout: tub/shower unit, with transfer bench  Bathroom Equipment: grab bars in shower, grab bars around toilet, tub transfer bench  Toilet Height: standard height  Home Equipment: rolling walker, single point cane, manual wheelchair, lift chair, oxygen, alert
[facility] Department   Phone: [phone]    Physical Therapy    [] Initial Evaluation            [x] Daily Treatment Note         [] Discharge Summary      Patient: Josephine Leija   : 1950   MRN: [id number]   Date of Service:  3/24/2023  Admitting Diagnosis: CHF (congestive heart failure), NYHA class I, acute on chronic, combined ([facility] [address]  Current Admission Summary: Pt. Presents with acute on chronic CHF. Negative for PE. Covid and C-diff pending. Past Medical History:  has a past medical history of Asthma, Back pain, Bipolar 1 disorder ([facility] [address] [facility] GERD (gastroesophageal reflux disease), Hyperlipidemia, Hypertension, and Tachycardia. Past Surgical History:  has a past surgical history that includes Cholecystectomy; Leg Surgery; Appendectomy; and Endometrial ablation. Discharge Recommendations: Josephine Leija scored a 20/24 on the AM-PAC short mobility form. At this time, no further PT is recommended upon discharge due to being steady with mobility. Recommend patient returns to prior setting with prior services.      DME Required For Discharge: no DME required at discharge  Precautions/Restrictions: Isolation precautions covid+  Weight Bearing Restrictions: no restrictions  [] Right Upper Extremity  [] Left Upper Extremity [] Right Lower Extremity  [] Left Lower Extremity     Required Braces/Orthotics: no braces required   [] Right  [] Left  Positional Restrictions:no positional restrictions    Pre-Admission Information   Lives With: family, Sister -in-law     Type of Home: house  Home Layout: one level, finished basement  Home Access: level entry  Bathroom Layout: tub/shower unit, with transfer bench  Bathroom Equipment: grab bars in shower, grab bars around toilet, tub transfer bench  Toilet Height: standard height  Home Equipment: rolling walker, single point cane, manual wheelchair, lift chair, oxygen, alert
Melinda tc/o burning with urination and states she feels like she is getting a uti. Message sent to notify Dr. Mauricio Barnard via perfect serve.
ONCOLOGY HEMATOLOGY CARE PROGRESS NOTE      HPI:    The patient was evaluated in [facility] office on 3/13/2023 for pancytopenia. The patient has several medical problems which include hypertension, diabetes, hyperlipidemia, asthma/COPD, morbid obesity. Following work-up done in the office was negative    Iron studies, B12, folate  Hepatitis B surface antigen, hepatitis C antibody, hepatitis B core IgM, hepatitis A IgM  BILLY, RA  Copper panel    Ultrasound of the liver and spleen were ordered due to high clinical suspicion of RICHEY causing splenomegaly and therefore pancytopenia    She is hospitalized on 3/20/2023 which shortness of breath. CTPA was negative for pulmonary embolism. Recent PFTs were suggestive of restrictive lung disease with reduced diffusion capacity. She has been evaluated by pulmonary as well as cardiology. She might need cardiac catheterization. Patient does not report external bleeding  No frequent infections    SUBJECTIVE:    Feels okay  Has stable shortness of breath  No external bleeding      ROS:         OBJECTIVE        Physical    VITALS:  Patient Vitals for the past 24 hrs:   BP Temp Temp src Pulse Resp SpO2 Weight   03/24/23 0901 -- -- -- -- -- 97 % --   03/24/23 0800 (!) 146/81 97.9 °F (36.6 °C) Oral (!) 102 20 94 % --   03/24/23 0453 -- -- -- -- -- -- 291 lb 12.8 oz (132.4 kg)   03/24/23 0345 (!) 93/54 98 °F (36.7 °C) Oral 78 14 94 % --   03/23/23 2330 (!) 96/58 98.3 °F (36.8 °C) Oral 82 16 91 % --   03/23/23 2212 (!) 149/86 -- -- -- -- -- --   03/23/23 1622 137/82 97.9 °F (36.6 °C) Oral 88 18 91 % --         24HR INTAKE/OUTPUT:  No intake or output data in the 24 hours ending 03/24/23 1256    Conscious alert and oriented. Morbidly obese-BMI 52  Has mild to moderate respiratory distress.   Abdominal obesity noted  Extremities 1+ edema noted      DATA:  CBC:    Recent Labs     03/24/23  0449 03/23/23  0434 03/22/23  0520 03/21/23  0425
PULMONARY AND CRITICAL CARE MEDICINE PROGRESS NOTE      SUBJECTIVE: Patient is sitting comfortably in the chair. She is maintaining saturation on 2 L nasal cannula. States improved shortness of breath. No cough or wheezing or chest pain or hemoptysis. Still has lower extremity edema. REVIEW OF SYSTEMS:   CONSTITUTIONAL SYMPTOMS: The patient denies fever, fatigue, night sweats, weight loss or weight gain. HEENT: No vision changes. No tinnitus, Denies sinus pain. No hoarseness, or dysphagia. CARDIOVASCULAR: Denies chest pain, palpitation, syncope. RESPIRATORY: See above  GASTROINTESTINAL: Denies nausea, abdominal pain or change in bowel function. SKIN: No rashes or itching. MUSCULOSKELETAL: Denies weakness or bone pain. NEUROLOGICAL: No headaches or seizures.      MEDICATIONS:      furosemide  40 mg Oral BID    levofloxacin  750 mg IntraVENous Q24H    spironolactone  12.5 mg Oral Daily    empagliflozin  10 mg Oral Daily    sodium chloride flush  5-40 mL IntraVENous 2 times per day    enoxaparin  30 mg SubCUTAneous BID    insulin lispro  0-4 Units SubCUTAneous TID WC    insulin lispro  0-4 Units SubCUTAneous Nightly    ferrous sulfate  325 mg Oral Daily with breakfast    lisinopril  30 mg Oral Daily    montelukast  10 mg Oral Nightly    tiotropium  2 puff Inhalation Daily    insulin glargine  10 Units SubCUTAneous Nightly    QUEtiapine  100 mg Oral Daily    QUEtiapine  200 mg Oral Nightly    ARIPiprazole  10 mg Oral Daily    atorvastatin  10 mg Oral Nightly      sodium chloride      dextrose       sodium chloride, ketorolac, sodium chloride flush, sodium chloride, ondansetron **OR** ondansetron, polyethylene glycol, acetaminophen **OR** acetaminophen, potassium chloride **OR** potassium alternative oral replacement **OR** potassium chloride, magnesium sulfate, glucose, dextrose bolus **OR** dextrose bolus, glucagon (rDNA), dextrose, albuterol sulfate HFA, ipratropium-albuterol     ALLERGIES:
Patient is in no distress and is refusing to use NIV therapy, She dose not want the mask on her face.
Patients head to toe assessment completed. Vital signs WNL, call light within reach. Scheduled medications given per MAR. Patient complained of generalized weakness, rates 6/10, PRN Tylenol given. Patient resting in bed. Will continue to monitor.
Patients head to toe assessment completed. Vital signs WNL, call light within reach. Scheduled medications given per MAR. Patient complained of headache, rates 6/10. PRN Tylenol given. Patient up to the chair. Will continue to monitor.
Patients head to toe assessment completed. Vital signs WNL, call light within reach. Scheduled medications given per MAR. Patient denies any pain at the moment. Patient up in a chair. Will continue to monitor.
Patients head to toe assessment completed. Vital signs WNL. Bed alarm engaged, call light within reach. Scheduled medications given per MAR. Patient denies any pain at the moment. Patient resting in bed. Will continue to monitor.
Pt  admitted to 3321,  Alert and oriented  B/P 178/84, on 2L N/C. Pt denies chest pain and nausea at this time  Pt with ongoing diarrhea, black  watery stool, however pt on iron med. Pt oriented to room, POC reviewed with pt, pt verbalized understanding. Pt bed alarm on, call light within reach, will continue to monitor.
Spoke with isabelle in respiratory and request humidification for oxygen. Isabelle verbalized understanding.
Ultrasound tech informed me she will not be able to get to this patient today(has to be done in room d/t isolation). So patient given dinner and made npo p mn.
[facility]   Progress Note  CHF/Pulmonary Hypertension Cardiology    Chief complaint: We are following this patient for acute on chronic diastolic HF, severe SOB  HPI:  Parviz Pond is a 82 yo female with a PMH of HTN, HFpEF, hyperlipidemia, diabetes, prior history of PE, morbid obesity, and ALEXANDRU who presented with shortness of breath and hypoxia. She normally uses supplemental oxygen only at night but for the last 3-4 days, she has been using the oxygen around the clock as she has been desaturating with minimal exertion. She recently saw Dr. Noe Swenson and underwent PFTs showing restrictive lung disease with reduced diffusion capacity. She was due to see cardiology next week in the office. She admits to chest discomfort with deep breaths but no fever, chills, chest pain, palpitations, PND, syncope, recent travel, sick contact, change in medications, bowel or bladder dysfunction. CTPA was negative for PE. Denies cough. She has been gaining weight and has bilateral pedal edema. She reports orthopnea. There is strong suspicion for untreated sleep apnea and obesity hypoventilation syndrome. She has a history of smoking, 20 pack years. She has been taking lasix 20 mg daily without improvement in symptoms. She has a history of breast cancer with chemo. She had a subsegmental PE in 2018 and was on Xarelto for 2 years after that. We are consulted for her shortness of breath and probable diastolic HF. Today she continues to have significant SOB. Her labs show anemia and pancytopenia. Labs:  Sodium 140  K 4.0  BUN/Cre 8/0.6  Bnp 80 (was 416 on admission)  H/H 10.4/32.5  Wbc 3.4  Platelet 97     Echo:  3/22/23:   Summary   Technically difficult exam.   Left ventricular cavity size is dilated with normal left ventricular wall   thickness. Overall left ventricular systolic function appears normal with an ejection   fraction that is visually estimated to be 55-60%.    No
constipation, diarrhea, jaundice, melena, odynophagia, reflux symptoms and vomiting  Hematologic/lymphatic: negative for bleeding, easy bruising, lymphadenopathy and petechiae  Musculoskeletal:negative for arthralgias, bone pain, muscle weakness, neck pain and stiff joints  Neurological: negative for dizziness, gait problems, headaches, seizures, speech problems, tremors and weakness  Behavioral/Psych: negative for anxiety, behavior problems, depression, fatigue and sleep disturbance  Endocrine: negative for diabetic symptoms including none, neuropathy, polyphagia, polyuria, polydipsia, vomiting and diarrhea and temperature intolerance  Allergic/Immunologic: negative for anaphylaxis, angioedema, hay fever and urticaria    Objective:     Patient Vitals for the past 8 hrs:   BP Temp Temp src Pulse Resp SpO2 Weight   03/24/23 0901 -- -- -- -- -- 97 % --   03/24/23 0800 (!) 146/81 97.9 °F (36.6 °C) Oral (!) 102 20 94 % --   03/24/23 0453 -- -- -- -- -- -- 291 lb 12.8 oz (132.4 kg)     No intake/output data recorded. No intake/output data recorded.     General Appearance: alert and oriented to person, place and time, well developed and well- nourished, in no acute distress  Skin: warm and dry, no rash or erythema  Head: normocephalic and atraumatic  Eyes: pupils equal, round, and reactive to light, extraocular eye movements intact, conjunctivae normal  ENT: external ear and ear canal normal bilaterally, nose without deformity, nasal mucosa and turbinates normal  Neck: supple and non-tender without mass, no cervical lymphadenopathy  Pulmonary/Chest: Poor air entry bilateral  Cardiovascular: normal rate, regular rhythm,  no murmurs, rubs, distal pulses intact, no carotid bruits  Abdomen: soft, non-tender, non-distended, normal bowel sounds, no masses or organomegaly  Lymph Nodes: Cervical, supraclavicular normal  Extremities: no cyanosis, clubbing or edema  Musculoskeletal: normal range of motion, no joint swelling,
crackles/wheezes  Extremities: trace edema. No erythema or warmth  Neuro: No gross focal deficits noted  Skin: Warm, dry    Labs:   Recent Labs     03/24/23 0449 03/25/23  0543 03/26/23  0622   WBC 3.5* 3.5* 4.4   HGB 11.3* 11.5* 10.8*   HCT 35.6* 36.1 33.5*   * 109* 113*       Recent Labs     03/24/23 0449 03/25/23  0543 03/26/23  0622    139 137   K 3.9 4.2 3.8   CL 98* 94* 92*   CO2 37* 38* 35*   BUN 10 13 22*   CREATININE 0.6 0.6 0.9   CALCIUM 9.2 9.5 9.3       No results for input(s): AST, ALT, BILIDIR, BILITOT, ALKPHOS in the last 72 hours. No results for input(s): INR in the last 72 hours. No results for input(s): Kary Height in the last 72 hours.     Assessment/Plan:    Active Hospital Problems    Diagnosis Date Noted    CHF (congestive heart failure), NYHA class I, acute on chronic, combined ([facility] [I50.43] 03/20/2023     Priority: Medium    Acute on chronic congestive heart failure ([facility] [I50.9] 03/23/2023    Acute on chronic respiratory failure with hypoxia (HCC) [J96.21] 03/23/2023    Pancytopenia ([facility] [D61.818] 03/23/2023    Iron deficiency anemia [D50.9] 03/23/2023    Morbid obesity ([facility] [E66.01] 03/23/2023       Shortness of breath: Likely 2/2 coronavirus 229 E vs acute on chronic diastolic CHF  Restrictive lung disease  Acute hypercapnic hypoxic respiratory failure  CTPA negative for PE or any acute pulmonary pathology  Last echo on 11/22 showed EF of 55 to 60%; repeat non-acute  Recent PFT suggestive of restrictive lung disease with reduced diffusion capacity  Was referred to cardiology; Dr. Lawanda Whitaker  for cardiac cath by pulmonologist  Cardiology and pulm following  IV diuresis with lasix  Ins/outs  Trend cr and lytes  Declined bipap  O2 per protocol    Suspected UTI  UA with +LE and WBCs, 4+ bacteria  IV cipro for now  Follow up uclutres     Hypertension  continue home dose of lisinopril and atenolol  Monitor BP closely     Hx of diabetes mellitus  Held oral antidiabetic
follow-up. Take a copy of this screening test to your primary care physician on your next office visit. Interpretation:      0 -   7: It is unlikely that you are abnormally sleepy. 8 -   9: You have an average amount of daytime sleepiness. 10 - 15: You may be excessively sleepy depending on the situation. You may want to consider seeking medical attention. 16 - 24: You are excessively sleepy and should consider seeking medical attention.       Electronically signed by Tamir Rhoades RCP on 3/24/2023 at 4:34 PM
oxygen, alert button  Transfer Assistance: Independent without use of device  Ambulation Assistance:Independent without use of device  ADL Assistance: independent with all ADL's but recently has had a difficult time showering and getting shoes on  IADL Assistance: independent with homemaking tasks until the last 3-4 weeks  Active :        [x] Yes  [] No  Hand Dominance: [] Left  [x] Right  Current Employment: retired. Occupation: cleaned houses  Hobbies:   Recent Falls: 1fall 4 weeks ago stumbled over O2 tubing    Examination   Vision:   Vision Gross Assessment: Impaired  Hearing:   WFL  Observation:   General Observation:  edematous B LE pitting and firm  Posture:   Distended abdomen, Pt. Concerned about the L lower quadrant being bigger. MD aware  Sensation:   WFL  Proprioception:    WFL  Tone:   Normotonic  Coordination Testing:   WFL    ROM:   (B) LE AROM WFL  Strength:   (B) LE strength grossly WFL  Therapist Clinical Decision Making (Complexity): medium complexity  Clinical Presentation: evolving      Subjective  General: Supine in bed, agreeable to therapy. Describes being depressed, worried of going to sleep and dying. Support provided  Pain: 0/10  Pain Interventions: not applicable       Functional Mobility  Bed Mobility  Supine to Sit: modified independent  Sit to Supine: modified independent  Comments:  Transfers  Sit to stand transfer: modified independent  Stand to sit transfer: modified independent  Comments:  Ambulation  Surface:level surface  Assistive Device: no device  Other Appliance: supplemental O2  Assistance: supervision  Distance: 60' x 2  Gait Mechanics: Slow and guarded, wide CINTIA, increased med/lat sway d/t body habitus  Comments:  limited by SOB, desated to 88% on 2L with O2.  Pt. Is ambulating short distances in the room without assist.  Stair Mobility  Stair mobility not completed on this date.   Comments:  Wheelchair Mobility:  No w/c mobility completed on this
[facility] filed at 3/22/2023 0457  Gross per 24 hour   Intake 780 ml   Output --   Net 780 ml        Wt Readings from Last 3 Encounters:   03/20/23 277 lb 9 oz (125.9 kg)   03/14/23 (!) 305 lb (138.3 kg)   10/21/21 262 lb (118.8 kg)       Physical Examination:   General appearance:  No apparent distress, appears stated age and cooperative. HEENT: Normocephalic, sclera clear., PERRLA. Trachea midline, no adenopathy. Cardiovascular: Regular rate and rhythm, normal S1, S2. No murmur. Respiratory:Clear to auscultation bilaterally, no wheeze or crackles. GI: Abdomen soft, no tenderness, not distended, normal bowel sounds  Musculoskeletal: No cyanosis in digits. No BLE edema present  Neurology: CN 2-12 grossly intact. No speech or motor deficits  Psych: Not agitated, appropriate affect  Skin: Warm, dry, normal turgor    Labs and Tests:  CBC:   Recent Labs     03/20/23  0517 03/21/23  0425 03/22/23  0520   WBC 2.4* 3.2* 4.2   HGB 10.2* 10.4* 10.7*   PLT 86* 94* 101*       BMP:    Recent Labs     03/20/23  0517 03/21/23  0425 03/22/23  0520    141 140   K 3.9 3.7 3.8   CL 97* 97* 99   CO2 35* 36* 30   BUN 11 10 12   CREATININE 0.5* 0.6 0.6   GLUCOSE 117* 118* 123*       Hepatic:   Recent Labs     03/20/23  0517   AST 19   ALT 11   BILITOT 0.3   ALKPHOS 72       CT CHEST PULMONARY EMBOLISM W CONTRAST   Final Result   No evidence of pulmonary embolism or acute pulmonary abnormality. XR CHEST PORTABLE   Final Result   Findings suggestive of interstitial edema. US ABDOMEN COMPLETE    (Results Pending)       Problem List  Principal Problem:    CHF (congestive heart failure), NYHA class I, acute on chronic, combined ([facility]  Resolved Problems:    * No resolved hospital problems.  Mook Pace MD   3/22/2023 5:15 PM
data filed at 3/27/2023 0514  Gross per 24 hour   Intake 1560 ml   Output --   Net 1560 ml       Lab Data:  CBC:   Lab Results   Component Value Date/Time    WBC 4.3 03/27/2023 05:18 AM    HGB 10.8 03/27/2023 05:18 AM     03/27/2023 05:18 AM     BMP:  Lab Results   Component Value Date/Time     03/26/2023 06:22 AM    K 3.8 03/26/2023 06:22 AM    K 3.9 03/20/2023 05:17 AM    CL 92 03/26/2023 06:22 AM    CO2 35 03/26/2023 06:22 AM    BUN 22 03/26/2023 06:22 AM    CREATININE 0.9 03/26/2023 06:22 AM    GLUCOSE 132 03/26/2023 06:22 AM     INR: No results found for: INR     CARDIAC LABS  ENZYMES:No results for input(s): CKMB, CKMBINDEX, TROPONINI in the last 72 hours. Invalid input(s): CKTOTAL;3  FASTING LIPID PANEL:  Lab Results   Component Value Date/Time    HDL 49 03/21/2023 04:25 AM    LDLCALC 51 03/21/2023 04:25 AM    TRIG 102 03/21/2023 04:25 AM    TSH 2.29 03/20/2023 10:03 AM     LIVER PROFILE:  Lab Results   Component Value Date/Time    AST 19 03/20/2023 05:17 AM    ALT 11 03/20/2023 05:17 AM     BNP:   Lab Results   Component Value Date/Time    PROBNP 33 03/25/2023 05:43 AM    PROBNP 80 03/23/2023 04:34 AM    PROBNP 416 03/20/2023 05:17 AM     Iron Studies:    Lab Results   Component Value Date/Time    FERRITIN 57.1 03/22/2023 05:20 AM     Lab Results   Component Value Date    IRON 42 03/20/2023    TIBC 367 03/20/2023    FERRITIN 57.1 03/22/2023      Iron Deficiency Anemia:  Yes IV Iron Therapy:  Yes  2017 ACC/AHA HF Guidelines:   intravenous iron replacement in patients with New York Heart Association (NYHA) class II and III HF and iron deficiency(ferritin <100 ng/ml or 100-300 ng/ml if transferrin saturation <20%), to improve functional status and QoL. 1. WEIGHT: Admit Weight: (!) 305 lb (138.3 kg)      Today  Weight: 286 lb 13.1 oz (130.1 kg)   2.  I/O   Intake/Output Summary (Last 24 hours) at 3/27/2023 0833  Last data filed at 3/27/2023 0514  Gross per 24 hour   Intake 1560 ml   Output --
declining status at home for the last month due to SOB and fatigue. Pt would benefit from skilled OT for progressive activity tolerance training for multi step ADLs prior to discharge.    Safety Interventions: patient left in chair and call light within reach    Plan  Frequency: 2-3/xperweek  Current Treatment Recommendations: balance training, functional mobility training, transfer training, endurance training, neuromuscular re-education, patient/caregiver education, and ADL/self-care training    Goals  Patient Goals: feel better   Short Term Goals:  Time Frame: discharge  Patient will complete upper body ADL at [facility]   Patient will complete lower body ADL at [facility]   Patient will complete toileting at modified independent   Patient will complete functional transfers at [facility]   Patient will complete functional mobility at [facility]     Therapy Session Time     Individual Group Co-treatment   Time In 0842      Time Out 0926      Minutes 44           Timed Code Treatment Minutes:   26  Total Treatment Minutes:  44       Electronically Signed By: Martin Patten, [address] [facility] [place], [address]
significantly better today. OK for discharge. Follow up scheduled with DAVID 3/30 and Dr Patrick Duffy on 4/28. Pt to be discharged on:   Lasix 40 bid  Spironolactone 12.5 qd  Jardiance 10 qd  Lisinopril 30 qd    Multiple medical conditions with risk of decompensation. All pertinent information and plan of care discussed with the physician. All questions and concerns were addressed to the patient. Alternatives to my treatment were discussed. I have discussed the above stated plan with patient and the nurse. The patient verbalized understanding and agreed with the plan. Thank you for allowing to us to participate in the care of Rohith Vazquez.     Janene Haynes APRN-[facility]   Office: [phone]

## 2023-03-27 NOTE — CARE COORDINATION
SW informed that patient did not have a ride home, no family members could be reached and pt would need a Lyft home. Pt will need a tank from [employer] to discharge home. QUIQUE took a tank from [employer] and left a message for DEMANDIT , and informed this information. Tank delivered to pt's room.     Electronically signed by JOHN Horan LSW on 3/27/2023 at 7:02 PM

## 2023-03-27 NOTE — PLAN OF CARE
Problem: Discharge Planning  Goal: Discharge to home or other facility with appropriate resources  Outcome: Progressing     Problem: Safety - Adult  Goal: Free from fall injury  Outcome: Progressing     Problem: Respiratory - Adult  Goal: Achieves optimal ventilation and oxygenation  Outcome: Progressing     Problem: Cardiovascular - Adult  Goal: Maintains optimal cardiac output and hemodynamic stability  Outcome: Progressing     Problem: Chronic Conditions and Co-morbidities  Goal: Patient's chronic conditions and co-morbidity symptoms are monitored and maintained or improved  Outcome: Progressing     Problem: Pain  Goal: Verbalizes/displays adequate comfort level or baseline comfort level  Outcome: Progressing

## 2023-03-28 ENCOUNTER — TELEPHONE (OUTPATIENT)
Dept: OTHER | Age: 73
End: 2023-03-28

## 2023-03-28 NOTE — TELEPHONE ENCOUNTER
[facility] FAILURE PROGRAM  TELEPHONE ENCOUNTER FORM    Rohith Vazquez 1950    ASSESSMENT:   Baseline weight: 286 lbs  Current weight: needs to buy scale  Patient weighing daily: Yes  What are your symptoms of heart failure: dyspnea, edema  Are you having any symptoms:  no  Patient knows who to call with symptoms: Yes  Diet history:      Patient states sodium limitation is : 3000 mg      Patient states fluid limitation is 64 oz  Patient following diet as instructed: Yes  Medication history: taking medications as instructed yes medication side effects noted No  Patient is being active at home: Yes  Patient knows date and time of follow up appointment: Yes   Patient has transportation to appointments: Yes    RECOMMENDATIONS:   Medication: taking as prescribed   Diet: no added salt diet  MD/ Clinic appointment: 3/31 with Payal Allen  Other: Doing well. Encouraged patient to call with any questions or concerns.

## 2023-03-30 ENCOUNTER — HOSPITAL ENCOUNTER (OUTPATIENT)
Age: 73
Discharge: HOME | End: 2023-03-30
Payer: MEDICARE

## 2023-03-30 ENCOUNTER — OFFICE VISIT (OUTPATIENT)
Dept: CARDIOLOGY CLINIC | Age: 73
End: 2023-03-30

## 2023-03-30 VITALS
HEIGHT: 61 IN | BODY MASS INDEX: 54.68 KG/M2 | OXYGEN SATURATION: 96 % | DIASTOLIC BLOOD PRESSURE: 82 MMHG | HEART RATE: 69 BPM | SYSTOLIC BLOOD PRESSURE: 118 MMHG | WEIGHT: 289.6 LBS

## 2023-03-30 DIAGNOSIS — I50.32 CHRONIC DIASTOLIC HEART FAILURE (HCC): ICD-10-CM

## 2023-03-30 DIAGNOSIS — I10 PRIMARY HYPERTENSION: ICD-10-CM

## 2023-03-30 DIAGNOSIS — E66.01 MORBID OBESITY WITH BMI OF 50.0-59.9, ADULT (HCC): ICD-10-CM

## 2023-03-30 DIAGNOSIS — I50.32 CHRONIC DIASTOLIC HEART FAILURE (HCC): Primary | ICD-10-CM

## 2023-03-30 DIAGNOSIS — G47.33 OSA (OBSTRUCTIVE SLEEP APNEA): ICD-10-CM

## 2023-03-30 LAB
ANION GAP SERPL CALCULATED.3IONS-SCNC: 14 MMOL/L (ref 3–16)
BUN SERPL-MCNC: 18 MG/DL (ref 7–20)
CALCIUM SERPL-MCNC: 9.7 MG/DL (ref 8.3–10.6)
CHLORIDE SERPL-SCNC: 96 MMOL/L (ref 99–110)
CO2 SERPL-SCNC: 28 MMOL/L (ref 21–32)
CREAT SERPL-MCNC: 0.8 MG/DL (ref 0.6–1.2)
GFR SERPLBLD CREATININE-BSD FMLA CKD-EPI: >60 ML/MIN/{1.73_M2}
GLUCOSE SERPL-MCNC: 117 MG/DL (ref 70–99)
NT-PROBNP SERPL-MCNC: 100 PG/ML (ref 0–124)
POTASSIUM SERPL-SCNC: 4.6 MMOL/L (ref 3.5–5.1)
SODIUM SERPL-SCNC: 138 MMOL/L (ref 136–145)

## 2023-03-30 PROCEDURE — 36415 COLL VENOUS BLD VENIPUNCTURE: CPT

## 2023-03-30 PROCEDURE — 80048 BASIC METABOLIC PNL TOTAL CA: CPT

## 2023-03-30 PROCEDURE — 83880 ASSAY OF NATRIURETIC PEPTIDE: CPT

## 2023-03-30 RX ORDER — PIOGLITAZONEHYDROCHLORIDE 30 MG/1
30 TABLET ORAL DAILY
COMMUNITY
End: 2023-03-30 | Stop reason: SINTOL

## 2023-03-30 RX ORDER — CIPROFLOXACIN 500 MG/5ML
250 KIT ORAL 2 TIMES DAILY
COMMUNITY

## 2023-03-30 NOTE — PROGRESS NOTES
full  There is no clubbing, cyanosis of the extremities.   no edema  Pedal Pulses: 2+ and equal   Abdomen: morbid obesity  No masses or tenderness  Liver/Spleen: No Abnormalities Noted  Neurological/Psychiatric:  Alert and oriented in all spheres  Moves all extremities well  Exhibits normal gait balance and coordination  No abnormalities of mood, affect, memory, mentation, or behavior are noted    Lab Data:    CBC:   Lab Results   Component Value Date/Time    WBC 4.3 03/27/2023 05:18 AM    WBC 4.4 03/26/2023 06:22 AM    WBC 3.5 03/25/2023 05:43 AM    RBC 3.57 03/27/2023 05:18 AM    RBC 3.58 03/26/2023 06:22 AM    RBC 3.83 03/25/2023 05:43 AM    HGB 10.8 03/27/2023 05:18 AM    HGB 10.8 03/26/2023 06:22 AM    HGB 11.5 03/25/2023 05:43 AM    HCT 33.4 03/27/2023 05:18 AM    HCT 33.5 03/26/2023 06:22 AM    HCT 36.1 03/25/2023 05:43 AM    MCV 93.4 03/27/2023 05:18 AM    MCV 93.6 03/26/2023 06:22 AM    MCV 94.3 03/25/2023 05:43 AM    RDW 14.8 03/27/2023 05:18 AM    RDW 14.5 03/26/2023 06:22 AM    RDW 14.7 03/25/2023 05:43 AM     03/27/2023 05:18 AM     03/26/2023 06:22 AM     03/25/2023 05:43 AM     BMP:  Lab Results   Component Value Date/Time     03/26/2023 06:22 AM     03/25/2023 05:43 AM     03/24/2023 04:49 AM    K 3.8 03/26/2023 06:22 AM    K 4.2 03/25/2023 05:43 AM    K 3.9 03/24/2023 04:49 AM    K 3.9 03/20/2023 05:17 AM    CL 92 03/26/2023 06:22 AM    CL 94 03/25/2023 05:43 AM    CL 98 03/24/2023 04:49 AM    CO2 35 03/26/2023 06:22 AM    CO2 38 03/25/2023 05:43 AM    CO2 37 03/24/2023 04:49 AM    BUN 22 03/26/2023 06:22 AM    BUN 13 03/25/2023 05:43 AM    BUN 10 03/24/2023 04:49 AM    CREATININE 0.9 03/26/2023 06:22 AM    CREATININE 0.6 03/25/2023 05:43 AM    CREATININE 0.6 03/24/2023 04:49 AM     BNP:   Lab Results   Component Value Date/Time    PROBNP 33 03/25/2023 05:43 AM    PROBNP 80 03/23/2023 04:34 AM    PROBNP 416 03/20/2023 05:17 AM     Cardiac Imaging:  Echo:

## 2023-04-20 ENCOUNTER — OFFICE VISIT (OUTPATIENT)
Dept: CARDIOLOGY CLINIC | Age: 73
End: 2023-04-20
Payer: MEDICARE

## 2023-04-20 VITALS
SYSTOLIC BLOOD PRESSURE: 130 MMHG | OXYGEN SATURATION: 92 % | HEIGHT: 61 IN | DIASTOLIC BLOOD PRESSURE: 60 MMHG | WEIGHT: 285 LBS | BODY MASS INDEX: 53.81 KG/M2 | HEART RATE: 57 BPM

## 2023-04-20 DIAGNOSIS — G47.33 OSA (OBSTRUCTIVE SLEEP APNEA): ICD-10-CM

## 2023-04-20 DIAGNOSIS — I50.32 CHRONIC DIASTOLIC HEART FAILURE (HCC): Primary | ICD-10-CM

## 2023-04-20 DIAGNOSIS — I10 PRIMARY HYPERTENSION: ICD-10-CM

## 2023-04-20 DIAGNOSIS — E66.01 MORBID OBESITY WITH BMI OF 50.0-59.9, ADULT (HCC): ICD-10-CM

## 2023-04-20 PROCEDURE — G8427 DOCREV CUR MEDS BY ELIG CLIN: HCPCS | Performed by: CLINICAL NURSE SPECIALIST

## 2023-04-20 PROCEDURE — 1090F PRES/ABSN URINE INCON ASSESS: CPT | Performed by: CLINICAL NURSE SPECIALIST

## 2023-04-20 PROCEDURE — 3017F COLORECTAL CA SCREEN DOC REV: CPT | Performed by: CLINICAL NURSE SPECIALIST

## 2023-04-20 PROCEDURE — 3075F SYST BP GE 130 - 139MM HG: CPT | Performed by: CLINICAL NURSE SPECIALIST

## 2023-04-20 PROCEDURE — 1123F ACP DISCUSS/DSCN MKR DOCD: CPT | Performed by: CLINICAL NURSE SPECIALIST

## 2023-04-20 PROCEDURE — 3078F DIAST BP <80 MM HG: CPT | Performed by: CLINICAL NURSE SPECIALIST

## 2023-04-20 PROCEDURE — G8417 CALC BMI ABV UP PARAM F/U: HCPCS | Performed by: CLINICAL NURSE SPECIALIST

## 2023-04-20 PROCEDURE — 1036F TOBACCO NON-USER: CPT | Performed by: CLINICAL NURSE SPECIALIST

## 2023-04-20 PROCEDURE — G8400 PT W/DXA NO RESULTS DOC: HCPCS | Performed by: CLINICAL NURSE SPECIALIST

## 2023-04-20 PROCEDURE — 99214 OFFICE O/P EST MOD 30 MIN: CPT | Performed by: CLINICAL NURSE SPECIALIST

## 2023-04-20 PROCEDURE — 1111F DSCHRG MED/CURRENT MED MERGE: CPT | Performed by: CLINICAL NURSE SPECIALIST

## 2023-04-20 RX ORDER — FUROSEMIDE 40 MG/1
60 TABLET ORAL DAILY
Qty: 60 TABLET | Refills: 0 | Status: SHIPPED
Start: 2023-04-20

## 2023-04-20 NOTE — PROGRESS NOTES
filling pressures. Avg E/e'   estimated to be 16.5. The left atrium is mildly dilated    Assessment:    1. Chronic diastolic heart failure (HCC) compensated   2. Primary hypertension    3. ALEXANDRU (obstructive sleep apnea)    4.  Morbid obesity with BMI of 50.0-59.9, adult ([facility]        Plan:   Patient Instructions   Cut lasix to 60 mg once a day  Continue all other medications  Check blood work 2-3 weeks  RTO in 3-4 months      I appreciate the opportunity of cooperating in the care of this individual.    Chau Whittaker, SONDRA - CNS, CNS, 4/20/2023, 2:28 PM

## 2023-04-20 NOTE — PATIENT INSTRUCTIONS
Cut lasix to 60 mg once a day  Continue all other medications  Check blood work 2-3 weeks  RTO in 3-4 months

## 2023-04-28 ENCOUNTER — HOSPITAL ENCOUNTER (INPATIENT)
Age: 73
LOS: 5 days | Discharge: HOME | DRG: 280 | End: 2023-05-03
Attending: EMERGENCY MEDICINE | Admitting: HOSPITALIST
Payer: MEDICARE

## 2023-04-28 ENCOUNTER — APPOINTMENT (OUTPATIENT)
Dept: GENERAL RADIOLOGY | Age: 73
DRG: 280 | End: 2023-04-28
Payer: MEDICARE

## 2023-04-28 DIAGNOSIS — D64.9 ANEMIA, UNSPECIFIED TYPE: ICD-10-CM

## 2023-04-28 DIAGNOSIS — R07.9 CHEST PAIN, UNSPECIFIED TYPE: Primary | ICD-10-CM

## 2023-04-28 LAB
ALBUMIN SERPL-MCNC: 4.5 G/DL (ref 3.4–5)
ALBUMIN/GLOB SERPL: 1.5 {RATIO} (ref 1.1–2.2)
ALP SERPL-CCNC: 72 U/L (ref 40–129)
ALT SERPL-CCNC: 19 U/L (ref 10–40)
ANION GAP SERPL CALCULATED.3IONS-SCNC: 12 MMOL/L (ref 3–16)
ANTI-XA UNFRAC HEPARIN: 0.24 IU/ML (ref 0.3–0.7)
AST SERPL-CCNC: 20 U/L (ref 15–37)
BASOPHILS # BLD: 0.1 K/UL (ref 0–0.2)
BASOPHILS NFR BLD: 0.9 %
BILIRUB SERPL-MCNC: 0.3 MG/DL (ref 0–1)
BUN SERPL-MCNC: 37 MG/DL (ref 7–20)
CALCIUM SERPL-MCNC: 10 MG/DL (ref 8.3–10.6)
CHLORIDE SERPL-SCNC: 103 MMOL/L (ref 99–110)
CO2 SERPL-SCNC: 21 MMOL/L (ref 21–32)
CREAT SERPL-MCNC: 1.2 MG/DL (ref 0.6–1.2)
D DIMER: 0.6 UG/ML FEU (ref 0–0.6)
DEPRECATED RDW RBC AUTO: 15.3 % (ref 12.4–15.4)
EKG ATRIAL RATE: 109 BPM
EKG DIAGNOSIS: NORMAL
EKG P AXIS: 53 DEGREES
EKG P-R INTERVAL: 132 MS
EKG Q-T INTERVAL: 292 MS
EKG QRS DURATION: 74 MS
EKG QTC CALCULATION (BAZETT): 393 MS
EKG R AXIS: 4 DEGREES
EKG T AXIS: 90 DEGREES
EKG VENTRICULAR RATE: 109 BPM
EOSINOPHIL # BLD: 0.3 K/UL (ref 0–0.6)
EOSINOPHIL NFR BLD: 5.1 %
GFR SERPLBLD CREATININE-BSD FMLA CKD-EPI: 49 ML/MIN/{1.73_M2}
GLUCOSE BLD-MCNC: 193 MG/DL (ref 70–99)
GLUCOSE BLD-MCNC: 197 MG/DL (ref 70–99)
GLUCOSE SERPL-MCNC: 202 MG/DL (ref 70–99)
HCT VFR BLD AUTO: 33.4 % (ref 36–48)
HGB BLD-MCNC: 10.8 G/DL (ref 12–16)
LYMPHOCYTES # BLD: 1.3 K/UL (ref 1–5.1)
LYMPHOCYTES NFR BLD: 21.4 %
MCH RBC QN AUTO: 31 PG (ref 26–34)
MCHC RBC AUTO-ENTMCNC: 32.3 G/DL (ref 31–36)
MCV RBC AUTO: 95.8 FL (ref 80–100)
MONOCYTES # BLD: 0.3 K/UL (ref 0–1.3)
MONOCYTES NFR BLD: 5.8 %
NEUTROPHILS # BLD: 4 K/UL (ref 1.7–7.7)
NEUTROPHILS NFR BLD: 66.8 %
NT-PROBNP SERPL-MCNC: <36 PG/ML (ref 0–124)
PERFORMED ON: ABNORMAL
PERFORMED ON: ABNORMAL
PLATELET # BLD AUTO: 131 K/UL (ref 135–450)
PMV BLD AUTO: 8.1 FL (ref 5–10.5)
POTASSIUM SERPL-SCNC: 5 MMOL/L (ref 3.5–5.1)
PROT SERPL-MCNC: 7.6 G/DL (ref 6.4–8.2)
RBC # BLD AUTO: 3.48 M/UL (ref 4–5.2)
S PYO AG THROAT QL: NEGATIVE
SODIUM SERPL-SCNC: 136 MMOL/L (ref 136–145)
TROPONIN, HIGH SENSITIVITY: 17 NG/L (ref 0–14)
TROPONIN, HIGH SENSITIVITY: 18 NG/L (ref 0–14)
TROPONIN, HIGH SENSITIVITY: 20 NG/L (ref 0–14)
WBC # BLD AUTO: 5.9 K/UL (ref 4–11)

## 2023-04-28 PROCEDURE — 85520 HEPARIN ASSAY: CPT

## 2023-04-28 PROCEDURE — 6370000000 HC RX 637 (ALT 250 FOR IP): Performed by: EMERGENCY MEDICINE

## 2023-04-28 PROCEDURE — 99223 1ST HOSP IP/OBS HIGH 75: CPT | Performed by: INTERNAL MEDICINE

## 2023-04-28 PROCEDURE — 36415 COLL VENOUS BLD VENIPUNCTURE: CPT

## 2023-04-28 PROCEDURE — 1200000000 HC SEMI PRIVATE

## 2023-04-28 PROCEDURE — 84484 ASSAY OF TROPONIN QUANT: CPT

## 2023-04-28 PROCEDURE — 6370000000 HC RX 637 (ALT 250 FOR IP): Performed by: HOSPITALIST

## 2023-04-28 PROCEDURE — 85379 FIBRIN DEGRADATION QUANT: CPT

## 2023-04-28 PROCEDURE — 6360000002 HC RX W HCPCS: Performed by: HOSPITALIST

## 2023-04-28 PROCEDURE — 80053 COMPREHEN METABOLIC PANEL: CPT

## 2023-04-28 PROCEDURE — 71046 X-RAY EXAM CHEST 2 VIEWS: CPT

## 2023-04-28 PROCEDURE — 87081 CULTURE SCREEN ONLY: CPT

## 2023-04-28 PROCEDURE — 96374 THER/PROPH/DIAG INJ IV PUSH: CPT

## 2023-04-28 PROCEDURE — 87880 STREP A ASSAY W/OPTIC: CPT

## 2023-04-28 PROCEDURE — 99285 EMERGENCY DEPT VISIT HI MDM: CPT

## 2023-04-28 PROCEDURE — 6360000002 HC RX W HCPCS: Performed by: INTERNAL MEDICINE

## 2023-04-28 PROCEDURE — 93005 ELECTROCARDIOGRAM TRACING: CPT | Performed by: EMERGENCY MEDICINE

## 2023-04-28 PROCEDURE — 6370000000 HC RX 637 (ALT 250 FOR IP): Performed by: INTERNAL MEDICINE

## 2023-04-28 PROCEDURE — 93010 ELECTROCARDIOGRAM REPORT: CPT | Performed by: INTERNAL MEDICINE

## 2023-04-28 PROCEDURE — 6360000002 HC RX W HCPCS: Performed by: EMERGENCY MEDICINE

## 2023-04-28 PROCEDURE — 85025 COMPLETE CBC W/AUTO DIFF WBC: CPT

## 2023-04-28 PROCEDURE — 83880 ASSAY OF NATRIURETIC PEPTIDE: CPT

## 2023-04-28 PROCEDURE — 2580000003 HC RX 258: Performed by: INTERNAL MEDICINE

## 2023-04-28 PROCEDURE — 2580000003 HC RX 258: Performed by: HOSPITALIST

## 2023-04-28 RX ORDER — ACETAMINOPHEN 325 MG/1
650 TABLET ORAL EVERY 6 HOURS PRN
Status: DISCONTINUED | OUTPATIENT
Start: 2023-04-28 | End: 2023-05-03 | Stop reason: HOSPADM

## 2023-04-28 RX ORDER — HEPARIN SODIUM 1000 [USP'U]/ML
4000 INJECTION, SOLUTION INTRAVENOUS; SUBCUTANEOUS PRN
Status: DISCONTINUED | OUTPATIENT
Start: 2023-04-28 | End: 2023-05-01

## 2023-04-28 RX ORDER — ARIPIPRAZOLE 5 MG/1
5 TABLET ORAL DAILY
Status: DISCONTINUED | OUTPATIENT
Start: 2023-04-28 | End: 2023-05-03 | Stop reason: HOSPADM

## 2023-04-28 RX ORDER — ACETAMINOPHEN 650 MG/1
650 SUPPOSITORY RECTAL EVERY 6 HOURS PRN
Status: DISCONTINUED | OUTPATIENT
Start: 2023-04-28 | End: 2023-05-03 | Stop reason: HOSPADM

## 2023-04-28 RX ORDER — INSULIN LISPRO 100 [IU]/ML
0-4 INJECTION, SOLUTION INTRAVENOUS; SUBCUTANEOUS NIGHTLY
Status: DISCONTINUED | OUTPATIENT
Start: 2023-04-28 | End: 2023-05-03 | Stop reason: HOSPADM

## 2023-04-28 RX ORDER — ISOSORBIDE DINITRATE 10 MG/1
10 TABLET ORAL EVERY 8 HOURS
Status: DISCONTINUED | OUTPATIENT
Start: 2023-04-28 | End: 2023-05-03 | Stop reason: HOSPADM

## 2023-04-28 RX ORDER — UBIDECARENONE 75 MG
50 CAPSULE ORAL DAILY
Status: DISCONTINUED | OUTPATIENT
Start: 2023-04-28 | End: 2023-05-03 | Stop reason: HOSPADM

## 2023-04-28 RX ORDER — ARIPIPRAZOLE 5 MG/1
5 TABLET ORAL DAILY
Status: DISCONTINUED | OUTPATIENT
Start: 2023-04-29 | End: 2023-04-28

## 2023-04-28 RX ORDER — HEPARIN SODIUM 1000 [USP'U]/ML
2000 INJECTION, SOLUTION INTRAVENOUS; SUBCUTANEOUS PRN
Status: DISCONTINUED | OUTPATIENT
Start: 2023-04-28 | End: 2023-05-01

## 2023-04-28 RX ORDER — SPIRONOLACTONE 25 MG/1
12.5 TABLET ORAL DAILY
Status: DISCONTINUED | OUTPATIENT
Start: 2023-04-28 | End: 2023-04-28

## 2023-04-28 RX ORDER — ONDANSETRON 2 MG/ML
4 INJECTION INTRAMUSCULAR; INTRAVENOUS EVERY 6 HOURS PRN
Status: DISCONTINUED | OUTPATIENT
Start: 2023-04-28 | End: 2023-05-03 | Stop reason: HOSPADM

## 2023-04-28 RX ORDER — SODIUM CHLORIDE 0.9 % (FLUSH) 0.9 %
5-40 SYRINGE (ML) INJECTION EVERY 12 HOURS SCHEDULED
Status: DISCONTINUED | OUTPATIENT
Start: 2023-04-28 | End: 2023-05-03 | Stop reason: HOSPADM

## 2023-04-28 RX ORDER — ARIPIPRAZOLE 5 MG/1
10 TABLET ORAL DAILY
Status: DISCONTINUED | OUTPATIENT
Start: 2023-04-28 | End: 2023-04-28

## 2023-04-28 RX ORDER — ATORVASTATIN CALCIUM 40 MG/1
40 TABLET, FILM COATED ORAL DAILY
Status: DISCONTINUED | OUTPATIENT
Start: 2023-04-29 | End: 2023-05-03 | Stop reason: HOSPADM

## 2023-04-28 RX ORDER — HEPARIN SODIUM 10000 [USP'U]/100ML
5-30 INJECTION, SOLUTION INTRAVENOUS CONTINUOUS
Status: DISCONTINUED | OUTPATIENT
Start: 2023-04-28 | End: 2023-05-01

## 2023-04-28 RX ORDER — SODIUM CHLORIDE 0.9 % (FLUSH) 0.9 %
5-40 SYRINGE (ML) INJECTION PRN
Status: DISCONTINUED | OUTPATIENT
Start: 2023-04-28 | End: 2023-05-03 | Stop reason: HOSPADM

## 2023-04-28 RX ORDER — DEXTROSE MONOHYDRATE 100 MG/ML
INJECTION, SOLUTION INTRAVENOUS CONTINUOUS PRN
Status: DISCONTINUED | OUTPATIENT
Start: 2023-04-28 | End: 2023-05-03 | Stop reason: HOSPADM

## 2023-04-28 RX ORDER — ATENOLOL 25 MG/1
25 TABLET ORAL DAILY
Status: DISCONTINUED | OUTPATIENT
Start: 2023-04-28 | End: 2023-05-03 | Stop reason: HOSPADM

## 2023-04-28 RX ORDER — FERROUS SULFATE 325(65) MG
325 TABLET ORAL
Status: DISCONTINUED | OUTPATIENT
Start: 2023-04-29 | End: 2023-04-28

## 2023-04-28 RX ORDER — SODIUM CHLORIDE 9 MG/ML
INJECTION, SOLUTION INTRAVENOUS PRN
Status: DISCONTINUED | OUTPATIENT
Start: 2023-04-28 | End: 2023-05-03 | Stop reason: HOSPADM

## 2023-04-28 RX ORDER — NITROGLYCERIN 0.4 MG/1
0.4 TABLET SUBLINGUAL EVERY 5 MIN PRN
Status: DISCONTINUED | OUTPATIENT
Start: 2023-04-28 | End: 2023-05-03 | Stop reason: HOSPADM

## 2023-04-28 RX ORDER — LISINOPRIL 10 MG/1
10 TABLET ORAL DAILY
Status: DISCONTINUED | OUTPATIENT
Start: 2023-04-29 | End: 2023-04-30

## 2023-04-28 RX ORDER — ATORVASTATIN CALCIUM 10 MG/1
10 TABLET, FILM COATED ORAL DAILY
Status: DISCONTINUED | OUTPATIENT
Start: 2023-04-28 | End: 2023-04-28

## 2023-04-28 RX ORDER — ONDANSETRON 4 MG/1
4 TABLET, ORALLY DISINTEGRATING ORAL EVERY 8 HOURS PRN
Status: DISCONTINUED | OUTPATIENT
Start: 2023-04-28 | End: 2023-05-03 | Stop reason: HOSPADM

## 2023-04-28 RX ORDER — MONTELUKAST SODIUM 10 MG/1
10 TABLET ORAL NIGHTLY
Status: DISCONTINUED | OUTPATIENT
Start: 2023-04-28 | End: 2023-05-03 | Stop reason: HOSPADM

## 2023-04-28 RX ORDER — ALBUTEROL SULFATE 90 UG/1
2 AEROSOL, METERED RESPIRATORY (INHALATION) EVERY 6 HOURS PRN
Status: DISCONTINUED | OUTPATIENT
Start: 2023-04-28 | End: 2023-05-03 | Stop reason: HOSPADM

## 2023-04-28 RX ORDER — INSULIN LISPRO 100 [IU]/ML
0-4 INJECTION, SOLUTION INTRAVENOUS; SUBCUTANEOUS
Status: DISCONTINUED | OUTPATIENT
Start: 2023-04-28 | End: 2023-05-03 | Stop reason: HOSPADM

## 2023-04-28 RX ORDER — FUROSEMIDE 10 MG/ML
40 INJECTION INTRAMUSCULAR; INTRAVENOUS ONCE
Status: COMPLETED | OUTPATIENT
Start: 2023-04-28 | End: 2023-04-28

## 2023-04-28 RX ORDER — POLYETHYLENE GLYCOL 3350 17 G/17G
17 POWDER, FOR SOLUTION ORAL DAILY PRN
Status: DISCONTINUED | OUTPATIENT
Start: 2023-04-28 | End: 2023-05-03 | Stop reason: HOSPADM

## 2023-04-28 RX ORDER — ASPIRIN 81 MG/1
324 TABLET, CHEWABLE ORAL ONCE
Status: COMPLETED | OUTPATIENT
Start: 2023-04-28 | End: 2023-04-28

## 2023-04-28 RX ORDER — ASPIRIN 81 MG/1
81 TABLET, CHEWABLE ORAL DAILY
Status: DISCONTINUED | OUTPATIENT
Start: 2023-04-29 | End: 2023-05-03 | Stop reason: HOSPADM

## 2023-04-28 RX ORDER — HEPARIN SODIUM 1000 [USP'U]/ML
4000 INJECTION, SOLUTION INTRAVENOUS; SUBCUTANEOUS ONCE
Status: COMPLETED | OUTPATIENT
Start: 2023-04-28 | End: 2023-04-28

## 2023-04-28 RX ORDER — ENOXAPARIN SODIUM 100 MG/ML
30 INJECTION SUBCUTANEOUS 2 TIMES DAILY
Status: DISCONTINUED | OUTPATIENT
Start: 2023-04-28 | End: 2023-04-28

## 2023-04-28 RX ORDER — QUETIAPINE FUMARATE 100 MG/1
100 TABLET, FILM COATED ORAL DAILY
Status: DISCONTINUED | OUTPATIENT
Start: 2023-04-28 | End: 2023-05-03 | Stop reason: HOSPADM

## 2023-04-28 RX ADMIN — Medication 10 ML: at 21:37

## 2023-04-28 RX ADMIN — ISOSORBIDE DINITRATE 10 MG: 10 TABLET ORAL at 18:32

## 2023-04-28 RX ADMIN — ATENOLOL 25 MG: 25 TABLET ORAL at 14:32

## 2023-04-28 RX ADMIN — ARIPIPRAZOLE 5 MG: 5 TABLET ORAL at 14:34

## 2023-04-28 RX ADMIN — ASPIRIN 324 MG: 81 TABLET, CHEWABLE ORAL at 06:47

## 2023-04-28 RX ADMIN — ATORVASTATIN CALCIUM 10 MG: 10 TABLET, FILM COATED ORAL at 14:30

## 2023-04-28 RX ADMIN — LISINOPRIL 30 MG: 20 TABLET ORAL at 14:32

## 2023-04-28 RX ADMIN — EMPAGLIFLOZIN 10 MG: 10 TABLET, FILM COATED ORAL at 14:32

## 2023-04-28 RX ADMIN — QUETIAPINE FUMARATE 100 MG: 100 TABLET ORAL at 14:35

## 2023-04-28 RX ADMIN — IRON SUCROSE 200 MG: 20 INJECTION, SOLUTION INTRAVENOUS at 19:01

## 2023-04-28 RX ADMIN — HEPARIN SODIUM 2000 UNITS: 1000 INJECTION INTRAVENOUS; SUBCUTANEOUS at 21:54

## 2023-04-28 RX ADMIN — MONTELUKAST SODIUM 10 MG: 10 TABLET, FILM COATED ORAL at 21:37

## 2023-04-28 RX ADMIN — SPIRONOLACTONE 12.5 MG: 25 TABLET ORAL at 14:32

## 2023-04-28 RX ADMIN — HEPARIN SODIUM 4000 UNITS: 1000 INJECTION INTRAVENOUS; SUBCUTANEOUS at 15:29

## 2023-04-28 RX ADMIN — ENOXAPARIN SODIUM 30 MG: 100 INJECTION SUBCUTANEOUS at 14:30

## 2023-04-28 RX ADMIN — FUROSEMIDE 40 MG: 10 INJECTION, SOLUTION INTRAMUSCULAR; INTRAVENOUS at 06:49

## 2023-04-28 RX ADMIN — HEPARIN SODIUM AND DEXTROSE 7 UNITS/KG/HR: 10000; 5 INJECTION INTRAVENOUS at 15:40

## 2023-04-28 ASSESSMENT — PAIN SCALES - GENERAL
PAINLEVEL_OUTOF10: 7
PAINLEVEL_OUTOF10: 3
PAINLEVEL_OUTOF10: 0
PAINLEVEL_OUTOF10: 3
PAINLEVEL_OUTOF10: 6

## 2023-04-28 ASSESSMENT — LIFESTYLE VARIABLES
HOW MANY STANDARD DRINKS CONTAINING ALCOHOL DO YOU HAVE ON A TYPICAL DAY: PATIENT DOES NOT DRINK
HOW MANY STANDARD DRINKS CONTAINING ALCOHOL DO YOU HAVE ON A TYPICAL DAY: PATIENT DOES NOT DRINK
HOW OFTEN DO YOU HAVE A DRINK CONTAINING ALCOHOL: NEVER
HOW OFTEN DO YOU HAVE A DRINK CONTAINING ALCOHOL: NEVER

## 2023-04-28 ASSESSMENT — PAIN - FUNCTIONAL ASSESSMENT: PAIN_FUNCTIONAL_ASSESSMENT: 0-10

## 2023-04-28 ASSESSMENT — PAIN DESCRIPTION - LOCATION
LOCATION: CHEST

## 2023-04-28 ASSESSMENT — PAIN DESCRIPTION - ORIENTATION
ORIENTATION: MID

## 2023-04-29 LAB
ANION GAP SERPL CALCULATED.3IONS-SCNC: 12 MMOL/L (ref 3–16)
ANTI-XA UNFRAC HEPARIN: 0.13 IU/ML (ref 0.3–0.7)
ANTI-XA UNFRAC HEPARIN: 0.13 IU/ML (ref 0.3–0.7)
ANTI-XA UNFRAC HEPARIN: 0.3 IU/ML (ref 0.3–0.7)
BUN SERPL-MCNC: 40 MG/DL (ref 7–20)
CALCIUM SERPL-MCNC: 9.7 MG/DL (ref 8.3–10.6)
CHLORIDE SERPL-SCNC: 99 MMOL/L (ref 99–110)
CO2 SERPL-SCNC: 21 MMOL/L (ref 21–32)
CREAT SERPL-MCNC: 1.2 MG/DL (ref 0.6–1.2)
DEPRECATED RDW RBC AUTO: 15.9 % (ref 12.4–15.4)
GFR SERPLBLD CREATININE-BSD FMLA CKD-EPI: 49 ML/MIN/{1.73_M2}
GLUCOSE BLD-MCNC: 151 MG/DL (ref 70–99)
GLUCOSE BLD-MCNC: 188 MG/DL (ref 70–99)
GLUCOSE BLD-MCNC: 200 MG/DL (ref 70–99)
GLUCOSE BLD-MCNC: 209 MG/DL (ref 70–99)
GLUCOSE SERPL-MCNC: 232 MG/DL (ref 70–99)
HCT VFR BLD AUTO: 34.3 % (ref 36–48)
HGB BLD-MCNC: 10.9 G/DL (ref 12–16)
INR PPP: 1.13 (ref 0.84–1.16)
LV EF: 58 %
LVEF MODALITY: NORMAL
MCH RBC QN AUTO: 30.6 PG (ref 26–34)
MCHC RBC AUTO-ENTMCNC: 31.8 G/DL (ref 31–36)
MCV RBC AUTO: 96.4 FL (ref 80–100)
PERFORMED ON: ABNORMAL
PLATELET # BLD AUTO: 151 K/UL (ref 135–450)
PMV BLD AUTO: 8.6 FL (ref 5–10.5)
POTASSIUM SERPL-SCNC: 5.7 MMOL/L (ref 3.5–5.1)
POTASSIUM SERPL-SCNC: 5.9 MMOL/L (ref 3.5–5.1)
PROTHROMBIN TIME: 14.5 SEC (ref 11.5–14.8)
RBC # BLD AUTO: 3.56 M/UL (ref 4–5.2)
REASON FOR REJECTION: NORMAL
REJECTED TEST: NORMAL
SODIUM SERPL-SCNC: 132 MMOL/L (ref 136–145)
WBC # BLD AUTO: 6.2 K/UL (ref 4–11)

## 2023-04-29 PROCEDURE — 94640 AIRWAY INHALATION TREATMENT: CPT

## 2023-04-29 PROCEDURE — C8924 2D TTE W OR W/O FOL W/CON,FU: HCPCS

## 2023-04-29 PROCEDURE — 6370000000 HC RX 637 (ALT 250 FOR IP): Performed by: HOSPITALIST

## 2023-04-29 PROCEDURE — 80048 BASIC METABOLIC PNL TOTAL CA: CPT

## 2023-04-29 PROCEDURE — 85520 HEPARIN ASSAY: CPT

## 2023-04-29 PROCEDURE — 2700000000 HC OXYGEN THERAPY PER DAY

## 2023-04-29 PROCEDURE — 6360000002 HC RX W HCPCS: Performed by: NURSE PRACTITIONER

## 2023-04-29 PROCEDURE — 85027 COMPLETE CBC AUTOMATED: CPT

## 2023-04-29 PROCEDURE — 6360000002 HC RX W HCPCS: Performed by: HOSPITALIST

## 2023-04-29 PROCEDURE — 94761 N-INVAS EAR/PLS OXIMETRY MLT: CPT

## 2023-04-29 PROCEDURE — 2580000003 HC RX 258: Performed by: HOSPITALIST

## 2023-04-29 PROCEDURE — 36415 COLL VENOUS BLD VENIPUNCTURE: CPT

## 2023-04-29 PROCEDURE — 99233 SBSQ HOSP IP/OBS HIGH 50: CPT | Performed by: NURSE PRACTITIONER

## 2023-04-29 PROCEDURE — 6370000000 HC RX 637 (ALT 250 FOR IP): Performed by: INTERNAL MEDICINE

## 2023-04-29 PROCEDURE — 85610 PROTHROMBIN TIME: CPT

## 2023-04-29 PROCEDURE — 94762 N-INVAS EAR/PLS OXIMTRY CONT: CPT

## 2023-04-29 PROCEDURE — 2580000003 HC RX 258: Performed by: INTERNAL MEDICINE

## 2023-04-29 PROCEDURE — 1200000000 HC SEMI PRIVATE

## 2023-04-29 PROCEDURE — 6360000002 HC RX W HCPCS: Performed by: INTERNAL MEDICINE

## 2023-04-29 PROCEDURE — 6360000004 HC RX CONTRAST MEDICATION: Performed by: INTERNAL MEDICINE

## 2023-04-29 PROCEDURE — 84132 ASSAY OF SERUM POTASSIUM: CPT

## 2023-04-29 RX ORDER — QUETIAPINE FUMARATE 200 MG/1
200 TABLET, FILM COATED ORAL NIGHTLY
Status: DISCONTINUED | OUTPATIENT
Start: 2023-04-29 | End: 2023-05-03 | Stop reason: HOSPADM

## 2023-04-29 RX ORDER — FUROSEMIDE 10 MG/ML
20 INJECTION INTRAMUSCULAR; INTRAVENOUS ONCE
Status: COMPLETED | OUTPATIENT
Start: 2023-04-29 | End: 2023-04-29

## 2023-04-29 RX ADMIN — IRON SUCROSE 200 MG: 20 INJECTION, SOLUTION INTRAVENOUS at 17:25

## 2023-04-29 RX ADMIN — EMPAGLIFLOZIN 10 MG: 10 TABLET, FILM COATED ORAL at 10:27

## 2023-04-29 RX ADMIN — ISOSORBIDE DINITRATE 10 MG: 10 TABLET ORAL at 10:27

## 2023-04-29 RX ADMIN — QUETIAPINE FUMARATE 100 MG: 100 TABLET ORAL at 10:26

## 2023-04-29 RX ADMIN — FUROSEMIDE 20 MG: 10 INJECTION, SOLUTION INTRAMUSCULAR; INTRAVENOUS at 16:26

## 2023-04-29 RX ADMIN — ATENOLOL 25 MG: 25 TABLET ORAL at 10:26

## 2023-04-29 RX ADMIN — HEPARIN SODIUM AND DEXTROSE 11 UNITS/KG/HR: 10000; 5 INJECTION INTRAVENOUS at 13:51

## 2023-04-29 RX ADMIN — ONDANSETRON 4 MG: 2 INJECTION INTRAMUSCULAR; INTRAVENOUS at 11:17

## 2023-04-29 RX ADMIN — PERFLUTREN 1.5 ML: 6.52 INJECTION, SUSPENSION INTRAVENOUS at 10:09

## 2023-04-29 RX ADMIN — HEPARIN SODIUM 2000 UNITS: 1000 INJECTION INTRAVENOUS; SUBCUTANEOUS at 19:51

## 2023-04-29 RX ADMIN — LISINOPRIL 10 MG: 10 TABLET ORAL at 10:27

## 2023-04-29 RX ADMIN — MONTELUKAST SODIUM 10 MG: 10 TABLET, FILM COATED ORAL at 20:37

## 2023-04-29 RX ADMIN — TIOTROPIUM BROMIDE INHALATION SPRAY 2 PUFF: 3.12 SPRAY, METERED RESPIRATORY (INHALATION) at 09:14

## 2023-04-29 RX ADMIN — ASPIRIN 81 MG: 81 TABLET, CHEWABLE ORAL at 10:26

## 2023-04-29 RX ADMIN — Medication 10 ML: at 20:38

## 2023-04-29 RX ADMIN — QUETIAPINE FUMARATE 200 MG: 200 TABLET ORAL at 20:37

## 2023-04-29 RX ADMIN — ARIPIPRAZOLE 5 MG: 5 TABLET ORAL at 10:26

## 2023-04-29 RX ADMIN — HEPARIN SODIUM AND DEXTROSE 13 UNITS/KG/HR: 10000; 5 INJECTION INTRAVENOUS at 19:53

## 2023-04-29 RX ADMIN — INSULIN LISPRO 1 UNITS: 100 INJECTION, SOLUTION INTRAVENOUS; SUBCUTANEOUS at 12:43

## 2023-04-29 RX ADMIN — Medication 10 ML: at 10:27

## 2023-04-29 RX ADMIN — ATORVASTATIN CALCIUM 40 MG: 40 TABLET, FILM COATED ORAL at 10:27

## 2023-04-29 RX ADMIN — Medication 50 MCG: at 10:28

## 2023-04-29 RX ADMIN — HEPARIN SODIUM 2000 UNITS: 1000 INJECTION INTRAVENOUS; SUBCUTANEOUS at 13:47

## 2023-04-29 ASSESSMENT — PAIN DESCRIPTION - ORIENTATION: ORIENTATION: MID

## 2023-04-29 ASSESSMENT — PAIN SCALES - GENERAL
PAINLEVEL_OUTOF10: 0
PAINLEVEL_OUTOF10: 2
PAINLEVEL_OUTOF10: 0
PAINLEVEL_OUTOF10: 0
PAINLEVEL_OUTOF10: 4
PAINLEVEL_OUTOF10: 0
PAINLEVEL_OUTOF10: 4

## 2023-04-29 ASSESSMENT — PAIN DESCRIPTION - LOCATION: LOCATION: CHEST

## 2023-04-30 LAB
ANION GAP SERPL CALCULATED.3IONS-SCNC: 10 MMOL/L (ref 3–16)
ANTI-XA UNFRAC HEPARIN: 0.27 IU/ML (ref 0.3–0.7)
ANTI-XA UNFRAC HEPARIN: 0.52 IU/ML (ref 0.3–0.7)
ANTI-XA UNFRAC HEPARIN: 0.59 IU/ML (ref 0.3–0.7)
BACTERIA URNS QL MICRO: ABNORMAL /HPF
BILIRUB UR QL STRIP.AUTO: NEGATIVE
BUN SERPL-MCNC: 55 MG/DL (ref 7–20)
CALCIUM SERPL-MCNC: 9.1 MG/DL (ref 8.3–10.6)
CHLORIDE SERPL-SCNC: 102 MMOL/L (ref 99–110)
CLARITY UR: CLEAR
CO2 SERPL-SCNC: 21 MMOL/L (ref 21–32)
COLOR UR: YELLOW
CREAT SERPL-MCNC: 1.4 MG/DL (ref 0.6–1.2)
DEPRECATED RDW RBC AUTO: 15.7 % (ref 12.4–15.4)
EPI CELLS #/AREA URNS AUTO: 2 /HPF (ref 0–5)
GFR SERPLBLD CREATININE-BSD FMLA CKD-EPI: 40 ML/MIN/{1.73_M2}
GLUCOSE BLD-MCNC: 178 MG/DL (ref 70–99)
GLUCOSE BLD-MCNC: 186 MG/DL (ref 70–99)
GLUCOSE BLD-MCNC: 241 MG/DL (ref 70–99)
GLUCOSE BLD-MCNC: 295 MG/DL (ref 70–99)
GLUCOSE SERPL-MCNC: 285 MG/DL (ref 70–99)
GLUCOSE UR STRIP.AUTO-MCNC: >=1000 MG/DL
HCT VFR BLD AUTO: 28.5 % (ref 36–48)
HGB BLD-MCNC: 9.3 G/DL (ref 12–16)
HGB UR QL STRIP.AUTO: NEGATIVE
HYALINE CASTS #/AREA URNS AUTO: 0 /LPF (ref 0–8)
KETONES UR STRIP.AUTO-MCNC: NEGATIVE MG/DL
LEUKOCYTE ESTERASE UR QL STRIP.AUTO: ABNORMAL
MCH RBC QN AUTO: 31.3 PG (ref 26–34)
MCHC RBC AUTO-ENTMCNC: 32.7 G/DL (ref 31–36)
MCV RBC AUTO: 95.6 FL (ref 80–100)
NITRITE UR QL STRIP.AUTO: NEGATIVE
PERFORMED ON: ABNORMAL
PH UR STRIP.AUTO: 5.5 [PH] (ref 5–8)
PLATELET # BLD AUTO: 116 K/UL (ref 135–450)
PMV BLD AUTO: 8.4 FL (ref 5–10.5)
POTASSIUM SERPL-SCNC: 5.7 MMOL/L (ref 3.5–5.1)
PROT UR STRIP.AUTO-MCNC: NEGATIVE MG/DL
RBC # BLD AUTO: 2.98 M/UL (ref 4–5.2)
RBC CLUMPS #/AREA URNS AUTO: 0 /HPF (ref 0–4)
REASON FOR REJECTION: NORMAL
REJECTED TEST: NORMAL
S PYO THROAT QL CULT: NORMAL
SODIUM SERPL-SCNC: 133 MMOL/L (ref 136–145)
SP GR UR STRIP.AUTO: 1.01 (ref 1–1.03)
UA DIPSTICK W REFLEX MICRO PNL UR: YES
URN SPEC COLLECT METH UR: ABNORMAL
UROBILINOGEN UR STRIP-ACNC: 0.2 E.U./DL
WBC # BLD AUTO: 5.8 K/UL (ref 4–11)
WBC #/AREA URNS AUTO: 14 /HPF (ref 0–5)

## 2023-04-30 PROCEDURE — 36415 COLL VENOUS BLD VENIPUNCTURE: CPT

## 2023-04-30 PROCEDURE — 97535 SELF CARE MNGMENT TRAINING: CPT

## 2023-04-30 PROCEDURE — 80048 BASIC METABOLIC PNL TOTAL CA: CPT

## 2023-04-30 PROCEDURE — 94640 AIRWAY INHALATION TREATMENT: CPT

## 2023-04-30 PROCEDURE — 2580000003 HC RX 258: Performed by: HOSPITALIST

## 2023-04-30 PROCEDURE — 2580000003 HC RX 258: Performed by: NURSE PRACTITIONER

## 2023-04-30 PROCEDURE — 97165 OT EVAL LOW COMPLEX 30 MIN: CPT

## 2023-04-30 PROCEDURE — 1200000000 HC SEMI PRIVATE

## 2023-04-30 PROCEDURE — 94761 N-INVAS EAR/PLS OXIMETRY MLT: CPT

## 2023-04-30 PROCEDURE — 6370000000 HC RX 637 (ALT 250 FOR IP): Performed by: INTERNAL MEDICINE

## 2023-04-30 PROCEDURE — 85520 HEPARIN ASSAY: CPT

## 2023-04-30 PROCEDURE — 6360000002 HC RX W HCPCS: Performed by: INTERNAL MEDICINE

## 2023-04-30 PROCEDURE — 81001 URINALYSIS AUTO W/SCOPE: CPT

## 2023-04-30 PROCEDURE — 97161 PT EVAL LOW COMPLEX 20 MIN: CPT

## 2023-04-30 PROCEDURE — 97530 THERAPEUTIC ACTIVITIES: CPT

## 2023-04-30 PROCEDURE — 2580000003 HC RX 258: Performed by: INTERNAL MEDICINE

## 2023-04-30 PROCEDURE — 85027 COMPLETE CBC AUTOMATED: CPT

## 2023-04-30 PROCEDURE — 6370000000 HC RX 637 (ALT 250 FOR IP): Performed by: HOSPITALIST

## 2023-04-30 PROCEDURE — 6370000000 HC RX 637 (ALT 250 FOR IP): Performed by: NURSE PRACTITIONER

## 2023-04-30 PROCEDURE — 6360000002 HC RX W HCPCS: Performed by: HOSPITALIST

## 2023-04-30 PROCEDURE — 99233 SBSQ HOSP IP/OBS HIGH 50: CPT | Performed by: NURSE PRACTITIONER

## 2023-04-30 PROCEDURE — 2700000000 HC OXYGEN THERAPY PER DAY

## 2023-04-30 RX ORDER — ASCORBIC ACID 500 MG
500 TABLET ORAL DAILY
Status: COMPLETED | OUTPATIENT
Start: 2023-04-30 | End: 2023-05-02

## 2023-04-30 RX ORDER — 0.9 % SODIUM CHLORIDE 0.9 %
500 INTRAVENOUS SOLUTION INTRAVENOUS ONCE
Status: COMPLETED | OUTPATIENT
Start: 2023-04-30 | End: 2023-04-30

## 2023-04-30 RX ORDER — SODIUM CHLORIDE 450 MG/100ML
INJECTION, SOLUTION INTRAVENOUS CONTINUOUS
Status: ACTIVE | OUTPATIENT
Start: 2023-04-30 | End: 2023-05-01

## 2023-04-30 RX ADMIN — Medication 10 ML: at 20:16

## 2023-04-30 RX ADMIN — ACETAMINOPHEN 650 MG: 325 TABLET ORAL at 03:57

## 2023-04-30 RX ADMIN — ASPIRIN 81 MG: 81 TABLET, CHEWABLE ORAL at 08:55

## 2023-04-30 RX ADMIN — QUETIAPINE FUMARATE 100 MG: 100 TABLET ORAL at 08:56

## 2023-04-30 RX ADMIN — ONDANSETRON 4 MG: 2 INJECTION INTRAMUSCULAR; INTRAVENOUS at 09:38

## 2023-04-30 RX ADMIN — ATORVASTATIN CALCIUM 40 MG: 40 TABLET, FILM COATED ORAL at 08:56

## 2023-04-30 RX ADMIN — ISOSORBIDE DINITRATE 10 MG: 10 TABLET ORAL at 08:56

## 2023-04-30 RX ADMIN — SODIUM CHLORIDE 500 ML: 9 INJECTION, SOLUTION INTRAVENOUS at 12:30

## 2023-04-30 RX ADMIN — SODIUM ZIRCONIUM CYCLOSILICATE 5 G: 5 POWDER, FOR SUSPENSION ORAL at 14:53

## 2023-04-30 RX ADMIN — INSULIN LISPRO 2 UNITS: 100 INJECTION, SOLUTION INTRAVENOUS; SUBCUTANEOUS at 11:37

## 2023-04-30 RX ADMIN — EMPAGLIFLOZIN 10 MG: 10 TABLET, FILM COATED ORAL at 08:56

## 2023-04-30 RX ADMIN — IRON SUCROSE 200 MG: 20 INJECTION, SOLUTION INTRAVENOUS at 16:55

## 2023-04-30 RX ADMIN — SODIUM CHLORIDE: 4.5 INJECTION, SOLUTION INTRAVENOUS at 20:11

## 2023-04-30 RX ADMIN — Medication 10 ML: at 08:58

## 2023-04-30 RX ADMIN — ATENOLOL 25 MG: 25 TABLET ORAL at 08:58

## 2023-04-30 RX ADMIN — HEPARIN SODIUM 2000 UNITS: 1000 INJECTION INTRAVENOUS; SUBCUTANEOUS at 05:18

## 2023-04-30 RX ADMIN — HEPARIN SODIUM AND DEXTROSE 15 UNITS/KG/HR: 10000; 5 INJECTION INTRAVENOUS at 07:52

## 2023-04-30 RX ADMIN — ARIPIPRAZOLE 5 MG: 5 TABLET ORAL at 08:55

## 2023-04-30 RX ADMIN — QUETIAPINE FUMARATE 200 MG: 200 TABLET ORAL at 20:06

## 2023-04-30 RX ADMIN — SODIUM ZIRCONIUM CYCLOSILICATE 10 G: 10 POWDER, FOR SUSPENSION ORAL at 21:28

## 2023-04-30 RX ADMIN — LISINOPRIL 10 MG: 10 TABLET ORAL at 08:56

## 2023-04-30 RX ADMIN — Medication 50 MCG: at 08:59

## 2023-04-30 RX ADMIN — MONTELUKAST SODIUM 10 MG: 10 TABLET, FILM COATED ORAL at 20:06

## 2023-04-30 RX ADMIN — OXYCODONE HYDROCHLORIDE AND ACETAMINOPHEN 500 MG: 500 TABLET ORAL at 20:15

## 2023-04-30 RX ADMIN — HEPARIN SODIUM AND DEXTROSE 15 UNITS/KG/HR: 10000; 5 INJECTION INTRAVENOUS at 21:27

## 2023-04-30 RX ADMIN — TIOTROPIUM BROMIDE INHALATION SPRAY 2 PUFF: 3.12 SPRAY, METERED RESPIRATORY (INHALATION) at 08:50

## 2023-04-30 RX ADMIN — ISOSORBIDE DINITRATE 10 MG: 10 TABLET ORAL at 18:45

## 2023-04-30 ASSESSMENT — PAIN SCALES - GENERAL
PAINLEVEL_OUTOF10: 0
PAINLEVEL_OUTOF10: 2
PAINLEVEL_OUTOF10: 0
PAINLEVEL_OUTOF10: 6
PAINLEVEL_OUTOF10: 0
PAINLEVEL_OUTOF10: 2

## 2023-04-30 ASSESSMENT — PAIN DESCRIPTION - ORIENTATION: ORIENTATION: MID

## 2023-04-30 ASSESSMENT — PAIN DESCRIPTION - DESCRIPTORS: DESCRIPTORS: ACHING

## 2023-04-30 ASSESSMENT — PAIN DESCRIPTION - LOCATION: LOCATION: CHEST

## 2023-05-01 LAB
ANION GAP SERPL CALCULATED.3IONS-SCNC: 13 MMOL/L (ref 3–16)
ANTI-XA UNFRAC HEPARIN: 0.48 IU/ML (ref 0.3–0.7)
BUN SERPL-MCNC: 61 MG/DL (ref 7–20)
CALCIUM SERPL-MCNC: 9.1 MG/DL (ref 8.3–10.6)
CHLORIDE SERPL-SCNC: 104 MMOL/L (ref 99–110)
CK SERPL-CCNC: 68 U/L (ref 26–192)
CO2 SERPL-SCNC: 16 MMOL/L (ref 21–32)
CREAT SERPL-MCNC: 1.2 MG/DL (ref 0.6–1.2)
DEPRECATED RDW RBC AUTO: 16.2 % (ref 12.4–15.4)
GFR SERPLBLD CREATININE-BSD FMLA CKD-EPI: 49 ML/MIN/{1.73_M2}
GLUCOSE BLD-MCNC: 193 MG/DL (ref 70–99)
GLUCOSE BLD-MCNC: 193 MG/DL (ref 70–99)
GLUCOSE BLD-MCNC: 216 MG/DL (ref 70–99)
GLUCOSE BLD-MCNC: 218 MG/DL (ref 70–99)
GLUCOSE SERPL-MCNC: 165 MG/DL (ref 70–99)
HCT VFR BLD AUTO: 28.3 % (ref 36–48)
HGB BLD-MCNC: 8.6 G/DL (ref 12–16)
MCH RBC QN AUTO: 30.4 PG (ref 26–34)
MCHC RBC AUTO-ENTMCNC: 30.4 G/DL (ref 31–36)
MCV RBC AUTO: 99.9 FL (ref 80–100)
PERFORMED ON: ABNORMAL
PLATELET # BLD AUTO: 92 K/UL (ref 135–450)
PMV BLD AUTO: 8.4 FL (ref 5–10.5)
POTASSIUM SERPL-SCNC: 5.7 MMOL/L (ref 3.5–5.1)
POTASSIUM SERPL-SCNC: 5.8 MMOL/L (ref 3.5–5.1)
RBC # BLD AUTO: 2.83 M/UL (ref 4–5.2)
SODIUM SERPL-SCNC: 133 MMOL/L (ref 136–145)
WBC # BLD AUTO: 5.8 K/UL (ref 4–11)

## 2023-05-01 PROCEDURE — 6370000000 HC RX 637 (ALT 250 FOR IP): Performed by: INTERNAL MEDICINE

## 2023-05-01 PROCEDURE — 94761 N-INVAS EAR/PLS OXIMETRY MLT: CPT

## 2023-05-01 PROCEDURE — 94640 AIRWAY INHALATION TREATMENT: CPT

## 2023-05-01 PROCEDURE — 36415 COLL VENOUS BLD VENIPUNCTURE: CPT

## 2023-05-01 PROCEDURE — 2700000000 HC OXYGEN THERAPY PER DAY

## 2023-05-01 PROCEDURE — 85520 HEPARIN ASSAY: CPT

## 2023-05-01 PROCEDURE — 2500000003 HC RX 250 WO HCPCS: Performed by: INTERNAL MEDICINE

## 2023-05-01 PROCEDURE — 82550 ASSAY OF CK (CPK): CPT

## 2023-05-01 PROCEDURE — 1200000000 HC SEMI PRIVATE

## 2023-05-01 PROCEDURE — 84132 ASSAY OF SERUM POTASSIUM: CPT

## 2023-05-01 PROCEDURE — 97530 THERAPEUTIC ACTIVITIES: CPT

## 2023-05-01 PROCEDURE — 6370000000 HC RX 637 (ALT 250 FOR IP): Performed by: NURSE PRACTITIONER

## 2023-05-01 PROCEDURE — 85027 COMPLETE CBC AUTOMATED: CPT

## 2023-05-01 PROCEDURE — 2580000003 HC RX 258: Performed by: INTERNAL MEDICINE

## 2023-05-01 PROCEDURE — 6370000000 HC RX 637 (ALT 250 FOR IP): Performed by: HOSPITALIST

## 2023-05-01 PROCEDURE — 97116 GAIT TRAINING THERAPY: CPT

## 2023-05-01 PROCEDURE — 80048 BASIC METABOLIC PNL TOTAL CA: CPT

## 2023-05-01 PROCEDURE — 6360000002 HC RX W HCPCS: Performed by: HOSPITALIST

## 2023-05-01 PROCEDURE — 99232 SBSQ HOSP IP/OBS MODERATE 35: CPT | Performed by: INTERNAL MEDICINE

## 2023-05-01 RX ADMIN — QUETIAPINE FUMARATE 100 MG: 100 TABLET ORAL at 09:02

## 2023-05-01 RX ADMIN — SODIUM CHLORIDE: 4.5 INJECTION, SOLUTION INTRAVENOUS at 10:36

## 2023-05-01 RX ADMIN — MONTELUKAST SODIUM 10 MG: 10 TABLET, FILM COATED ORAL at 21:30

## 2023-05-01 RX ADMIN — Medication 50 MCG: at 09:03

## 2023-05-01 RX ADMIN — SODIUM ZIRCONIUM CYCLOSILICATE 10 G: 10 POWDER, FOR SUSPENSION ORAL at 09:03

## 2023-05-01 RX ADMIN — SODIUM ZIRCONIUM CYCLOSILICATE 15 G: 10 POWDER, FOR SUSPENSION ORAL at 21:31

## 2023-05-01 RX ADMIN — ONDANSETRON 4 MG: 2 INJECTION INTRAMUSCULAR; INTRAVENOUS at 07:43

## 2023-05-01 RX ADMIN — QUETIAPINE FUMARATE 200 MG: 200 TABLET ORAL at 21:30

## 2023-05-01 RX ADMIN — SODIUM BICARBONATE: 84 INJECTION, SOLUTION INTRAVENOUS at 10:40

## 2023-05-01 RX ADMIN — SODIUM ZIRCONIUM CYCLOSILICATE 5 G: 5 POWDER, FOR SUSPENSION ORAL at 09:00

## 2023-05-01 RX ADMIN — ISOSORBIDE DINITRATE 10 MG: 10 TABLET ORAL at 09:03

## 2023-05-01 RX ADMIN — OXYCODONE HYDROCHLORIDE AND ACETAMINOPHEN 500 MG: 500 TABLET ORAL at 09:02

## 2023-05-01 RX ADMIN — ACETAMINOPHEN 650 MG: 325 TABLET ORAL at 04:26

## 2023-05-01 RX ADMIN — INSULIN LISPRO 1 UNITS: 100 INJECTION, SOLUTION INTRAVENOUS; SUBCUTANEOUS at 17:17

## 2023-05-01 RX ADMIN — ASPIRIN 81 MG: 81 TABLET, CHEWABLE ORAL at 09:01

## 2023-05-01 RX ADMIN — ATORVASTATIN CALCIUM 40 MG: 40 TABLET, FILM COATED ORAL at 09:03

## 2023-05-01 RX ADMIN — ARIPIPRAZOLE 5 MG: 5 TABLET ORAL at 09:02

## 2023-05-01 RX ADMIN — ATENOLOL 25 MG: 25 TABLET ORAL at 09:02

## 2023-05-01 RX ADMIN — TIOTROPIUM BROMIDE INHALATION SPRAY 2 PUFF: 3.12 SPRAY, METERED RESPIRATORY (INHALATION) at 12:28

## 2023-05-01 RX ADMIN — ONDANSETRON 4 MG: 2 INJECTION INTRAMUSCULAR; INTRAVENOUS at 13:29

## 2023-05-01 ASSESSMENT — PAIN SCALES - GENERAL
PAINLEVEL_OUTOF10: 0
PAINLEVEL_OUTOF10: 4
PAINLEVEL_OUTOF10: 0
PAINLEVEL_OUTOF10: 3

## 2023-05-01 ASSESSMENT — PAIN DESCRIPTION - DESCRIPTORS
DESCRIPTORS: ACHING
DESCRIPTORS: DISCOMFORT

## 2023-05-01 ASSESSMENT — PAIN DESCRIPTION - LOCATION
LOCATION: CHEST
LOCATION: HEAD

## 2023-05-01 ASSESSMENT — PAIN SCALES - WONG BAKER
WONGBAKER_NUMERICALRESPONSE: 0
WONGBAKER_NUMERICALRESPONSE: 2

## 2023-05-01 ASSESSMENT — PAIN DESCRIPTION - ORIENTATION
ORIENTATION: MID
ORIENTATION: ANTERIOR

## 2023-05-02 LAB
ANION GAP SERPL CALCULATED.3IONS-SCNC: 7 MMOL/L (ref 3–16)
BASOPHILS # BLD: 0 K/UL (ref 0–0.2)
BASOPHILS NFR BLD: 0.7 %
BUN SERPL-MCNC: 46 MG/DL (ref 7–20)
CALCIUM SERPL-MCNC: 9.6 MG/DL (ref 8.3–10.6)
CHLORIDE SERPL-SCNC: 104 MMOL/L (ref 99–110)
CO2 SERPL-SCNC: 27 MMOL/L (ref 21–32)
CREAT SERPL-MCNC: 0.9 MG/DL (ref 0.6–1.2)
DEPRECATED RDW RBC AUTO: 15.7 % (ref 12.4–15.4)
EOSINOPHIL # BLD: 0.2 K/UL (ref 0–0.6)
EOSINOPHIL NFR BLD: 3.6 %
GFR SERPLBLD CREATININE-BSD FMLA CKD-EPI: >60 ML/MIN/{1.73_M2}
GLUCOSE BLD-MCNC: 159 MG/DL (ref 70–99)
GLUCOSE BLD-MCNC: 175 MG/DL (ref 70–99)
GLUCOSE BLD-MCNC: 204 MG/DL (ref 70–99)
GLUCOSE BLD-MCNC: 267 MG/DL (ref 70–99)
GLUCOSE SERPL-MCNC: 300 MG/DL (ref 70–99)
HCT VFR BLD AUTO: 23.9 % (ref 36–48)
HEMOCCULT STL QL: ABNORMAL
HGB BLD-MCNC: 7.6 G/DL (ref 12–16)
LYMPHOCYTES # BLD: 0.9 K/UL (ref 1–5.1)
LYMPHOCYTES NFR BLD: 18.8 %
MCH RBC QN AUTO: 30.5 PG (ref 26–34)
MCHC RBC AUTO-ENTMCNC: 31.9 G/DL (ref 31–36)
MCV RBC AUTO: 95.8 FL (ref 80–100)
MONOCYTES # BLD: 0.3 K/UL (ref 0–1.3)
MONOCYTES NFR BLD: 5.7 %
NEUTROPHILS # BLD: 3.3 K/UL (ref 1.7–7.7)
NEUTROPHILS NFR BLD: 71.2 %
PERFORMED ON: ABNORMAL
PLATELET # BLD AUTO: 96 K/UL (ref 135–450)
PMV BLD AUTO: 7.7 FL (ref 5–10.5)
POTASSIUM SERPL-SCNC: 5.2 MMOL/L (ref 3.5–5.1)
RBC # BLD AUTO: 2.5 M/UL (ref 4–5.2)
SODIUM SERPL-SCNC: 138 MMOL/L (ref 136–145)
WBC # BLD AUTO: 4.6 K/UL (ref 4–11)

## 2023-05-02 PROCEDURE — 6370000000 HC RX 637 (ALT 250 FOR IP): Performed by: INTERNAL MEDICINE

## 2023-05-02 PROCEDURE — 36415 COLL VENOUS BLD VENIPUNCTURE: CPT

## 2023-05-02 PROCEDURE — 85025 COMPLETE CBC W/AUTO DIFF WBC: CPT

## 2023-05-02 PROCEDURE — 80048 BASIC METABOLIC PNL TOTAL CA: CPT

## 2023-05-02 PROCEDURE — 97116 GAIT TRAINING THERAPY: CPT

## 2023-05-02 PROCEDURE — 82270 OCCULT BLOOD FECES: CPT

## 2023-05-02 PROCEDURE — 1200000000 HC SEMI PRIVATE

## 2023-05-02 PROCEDURE — 6360000002 HC RX W HCPCS: Performed by: INTERNAL MEDICINE

## 2023-05-02 PROCEDURE — 94640 AIRWAY INHALATION TREATMENT: CPT

## 2023-05-02 PROCEDURE — 6360000002 HC RX W HCPCS: Performed by: HOSPITALIST

## 2023-05-02 PROCEDURE — 94761 N-INVAS EAR/PLS OXIMETRY MLT: CPT

## 2023-05-02 PROCEDURE — C9113 INJ PANTOPRAZOLE SODIUM, VIA: HCPCS | Performed by: INTERNAL MEDICINE

## 2023-05-02 PROCEDURE — 2580000003 HC RX 258: Performed by: HOSPITALIST

## 2023-05-02 PROCEDURE — 97530 THERAPEUTIC ACTIVITIES: CPT

## 2023-05-02 PROCEDURE — 2700000000 HC OXYGEN THERAPY PER DAY

## 2023-05-02 PROCEDURE — 6370000000 HC RX 637 (ALT 250 FOR IP): Performed by: HOSPITALIST

## 2023-05-02 RX ORDER — LANOLIN ALCOHOL/MO/W.PET/CERES
6 CREAM (GRAM) TOPICAL NIGHTLY
Status: DISCONTINUED | OUTPATIENT
Start: 2023-05-02 | End: 2023-05-03 | Stop reason: HOSPADM

## 2023-05-02 RX ORDER — ASPIRIN 81 MG/1
81 TABLET, CHEWABLE ORAL DAILY
Qty: 30 TABLET | Refills: 3 | Status: SHIPPED | OUTPATIENT
Start: 2023-05-03

## 2023-05-02 RX ORDER — PANTOPRAZOLE SODIUM 40 MG/10ML
40 INJECTION, POWDER, LYOPHILIZED, FOR SOLUTION INTRAVENOUS 2 TIMES DAILY
Status: DISCONTINUED | OUTPATIENT
Start: 2023-05-02 | End: 2023-05-03

## 2023-05-02 RX ORDER — SODIUM CHLORIDE 0.9 % (FLUSH) 0.9 %
5-40 SYRINGE (ML) INJECTION PRN
Status: DISCONTINUED | OUTPATIENT
Start: 2023-05-02 | End: 2023-05-03 | Stop reason: HOSPADM

## 2023-05-02 RX ORDER — SODIUM CHLORIDE 0.9 % (FLUSH) 0.9 %
5-40 SYRINGE (ML) INJECTION EVERY 12 HOURS SCHEDULED
Status: DISCONTINUED | OUTPATIENT
Start: 2023-05-02 | End: 2023-05-03 | Stop reason: HOSPADM

## 2023-05-02 RX ORDER — SODIUM CHLORIDE 9 MG/ML
25 INJECTION, SOLUTION INTRAVENOUS PRN
Status: DISCONTINUED | OUTPATIENT
Start: 2023-05-02 | End: 2023-05-03 | Stop reason: HOSPADM

## 2023-05-02 RX ADMIN — SODIUM ZIRCONIUM CYCLOSILICATE 10 G: 10 POWDER, FOR SUSPENSION ORAL at 16:33

## 2023-05-02 RX ADMIN — ONDANSETRON 4 MG: 2 INJECTION INTRAMUSCULAR; INTRAVENOUS at 19:24

## 2023-05-02 RX ADMIN — PANTOPRAZOLE SODIUM 40 MG: 40 INJECTION, POWDER, LYOPHILIZED, FOR SOLUTION INTRAVENOUS at 21:24

## 2023-05-02 RX ADMIN — ASPIRIN 81 MG: 81 TABLET, CHEWABLE ORAL at 09:40

## 2023-05-02 RX ADMIN — MELATONIN TAB 3 MG 6 MG: 3 TAB at 21:24

## 2023-05-02 RX ADMIN — OXYCODONE HYDROCHLORIDE AND ACETAMINOPHEN 500 MG: 500 TABLET ORAL at 09:41

## 2023-05-02 RX ADMIN — QUETIAPINE FUMARATE 100 MG: 100 TABLET ORAL at 09:40

## 2023-05-02 RX ADMIN — Medication 50 MCG: at 09:41

## 2023-05-02 RX ADMIN — ATENOLOL 25 MG: 25 TABLET ORAL at 09:40

## 2023-05-02 RX ADMIN — Medication 10 ML: at 21:24

## 2023-05-02 RX ADMIN — MONTELUKAST SODIUM 10 MG: 10 TABLET, FILM COATED ORAL at 21:23

## 2023-05-02 RX ADMIN — Medication 10 ML: at 09:47

## 2023-05-02 RX ADMIN — ONDANSETRON 4 MG: 2 INJECTION INTRAMUSCULAR; INTRAVENOUS at 09:42

## 2023-05-02 RX ADMIN — QUETIAPINE FUMARATE 200 MG: 200 TABLET ORAL at 21:23

## 2023-05-02 RX ADMIN — ARIPIPRAZOLE 5 MG: 5 TABLET ORAL at 09:40

## 2023-05-02 RX ADMIN — INSULIN LISPRO 2 UNITS: 100 INJECTION, SOLUTION INTRAVENOUS; SUBCUTANEOUS at 13:11

## 2023-05-02 RX ADMIN — ONDANSETRON 4 MG: 2 INJECTION INTRAMUSCULAR; INTRAVENOUS at 00:34

## 2023-05-02 RX ADMIN — ATORVASTATIN CALCIUM 40 MG: 40 TABLET, FILM COATED ORAL at 09:40

## 2023-05-02 RX ADMIN — TIOTROPIUM BROMIDE INHALATION SPRAY 2 PUFF: 3.12 SPRAY, METERED RESPIRATORY (INHALATION) at 13:13

## 2023-05-02 NOTE — CARE COORDINATION
05/02/23 1124   IMM Letter   IMM Letter given to Patient/Family/Significant other/Guardian/POA/by: Second IMM given to patient by Daljit Neil RN/CCM   IMM Letter date given: 05/02/23   IMM Letter time given: 1115     Patient in agreement with not having the four hours Notice .

## 2023-05-02 NOTE — CARE COORDINATION
Discharge note:      CM/SW has been notified of discharge. Patient noted to have the following needs at discharge. CM/SW has coordinated the following services: Home oxygen  via  [address],  [place], [address]  Phone:  [phone]  Fax: [phone]       Discharge Destination:  Home    Transportation: Chirag Puckett will assist      All CM/SW needs met, will sign off.

## 2023-05-02 NOTE — CONSULTS
multiple small and large polyps and a poor prep. The polyps that were removed (greater than 5) were either tubulovillous or tubular adenomas. She was recommended to have a repeat colonoscopy within a year after a 2-day prep. She did have respiratory difficulties during the sedation and as such was converted to general anesthesia for the procedure. Admission Meds  No current facility-administered medications on file prior to encounter. Current Outpatient Medications on File Prior to Encounter   Medication Sig Dispense Refill    furosemide (LASIX) 40 MG tablet Take 1.5 tablets by mouth daily (Patient taking differently: Take 1.5 tablets by mouth daily Now takes 60 mg daily) 60 tablet 0    empagliflozin (JARDIANCE) 10 MG tablet Take 1 tablet by mouth daily 90 tablet 0    ferrous sulfate (IRON 325) 325 (65 Fe) MG tablet Take 1 tablet by mouth daily (with breakfast)      vitamin B-12 (CYANOCOBALAMIN) 100 MCG tablet Take 10 tablets by mouth Once a week on Wednesdays      TRULICITY 1.5 NC/8.9ZL SC injection       LEVEMIR FLEXTOUCH 100 UNIT/ML injection pen Pt takes 20 units mornings only      tiotropium (SPIRIVA RESPIMAT) 2.5 MCG/ACT AERS inhaler Inhale 2 puffs into the lungs daily 4 g 4    niacin 500 MG CR capsule Take 1 capsule by mouth nightly      simvastatin (ZOCOR) 20 MG tablet Take 1 tablet by mouth nightly      ARIPiprazole (ABILIFY) 10 MG tablet Take 0.5 tablets by mouth daily Pt takes 5 mg      montelukast (SINGULAIR) 10 MG tablet Take 1 tablet by mouth nightly      QUEtiapine (SEROQUEL) 100 MG tablet Take 1 tablet by mouth daily      albuterol (PROVENTIL HFA;VENTOLIN HFA) 108 (90 BASE) MCG/ACT inhaler Inhale 2 puffs into the lungs every 6 hours as needed      QUEtiapine (SEROQUEL) 200 MG tablet Take 1 tablet by mouth at bedtime      atenolol (TENORMIN) 50 MG tablet Take 1 tablet by mouth See Admin Instructions 1/2 TAB DAILY         Patient denies ASA, NSAID use.     Allergies  Allergies   Allergen

## 2023-05-02 NOTE — CARE COORDINATION
Discharge Planning;   PA [facility]   garcia code: Wava Ivette, rx is for 10 packets (1 daily)  for 10 days as per Pharmacist's request.

## 2023-05-02 NOTE — CARE COORDINATION
Case Management Assessment  Initial Evaluation    Date/Time of Evaluation: 5/2/2023 11:36 AM  Assessment Completed by: Donnie Lopez RN    If patient is discharged prior to next notation, then this note serves as note for discharge by case management. Patient Name: Muriel Fortune                   YOB: 1950  Diagnosis: Chest pain [R07.9]  Chest pain, unspecified type [R07.9]                   Date / Time: 4/28/2023  4:46 AM    Patient Admission Status: Inpatient   Readmission Risk (Low < 19, Mod (19-27), High > 27): Readmission Risk Score: 20.9    Current PCP: Karen Miller MD  PCP verified by CM? Yes    Chart Reviewed: Yes      History Provided by: Patient  Patient Orientation: Alert and Oriented    Patient Cognition: Alert    Hospitalization in the last 30 days (Readmission):  No    If yes, Readmission Assessment in  Navigator will be completed. Advance Directives:      Code Status: Full Code   Patient's Primary Decision Maker is: Legal Next of Kin      Discharge Planning:    Patient lives with: Family Members Type of Home: House  Primary Care Giver: Self  Patient Support Systems include: Family Members   Current Financial resources: Medicare  Current community resources: Other (Comment) (home oxygen)  Current services prior to admission: Durable Medical Equipment            Current DME: Mari Martin, Oxygen Therapy (Comment), Shower Chair, Wheelchair, Walker            Type of Home Care services:  None    ADLS  Prior functional level: Independent in ADLs/IADLs  Current functional level: Independent in ADLs/IADLs    PT AM-PAC: 19 /24  OT AM-PAC: 23 /24    Family can provide assistance at DC: Yes  Would you like Case Management to discuss the discharge plan with any other family members/significant others, and if so, who?  No  Plans to Return to Present Housing: Yes  Other Identified Issues/Barriers to RETURNING to current housing: No  Potential Assistance needed at discharge: N/A

## 2023-05-02 NOTE — DISCHARGE INSTR - COC
Continuity of Care Form    Patient Name: Althea Doll   :  1950  MRN:  [id number]    Admit date:  2023  Discharge date:  ***    Code Status Order: Full Code   Advance Directives:     Admitting Physician:  Reed Durham MD  PCP: Danyel Garcia MD    Discharging Nurse: [facility] Unit/Room#: L5E-6590/4873-24  Discharging Unit Phone Number: ***    Emergency Contact:   Extended Emergency Contact Information  Primary Emergency Contact: Shelby Gomez  Home Phone: [phone]  Relation: Brother/Sister    Past Surgical History:  Past Surgical History:   Procedure Laterality Date    APPENDECTOMY      BREAST SURGERY Left     Lumpectomy and lymph node removal    CHOLECYSTECTOMY      ENDOMETRIAL ABLATION      LEG SURGERY      LEFT TIB FIB FX, SURGERY X2       Immunization History:   Immunization History   Administered Date(s) Administered    COVID-19, PFIZER PURPLE top, DILUTE for use, (age 15 y+), 30mcg/0.3mL 2021, 2021, 2022       Active Problems:  Patient Active Problem List   Diagnosis Code    CHF (congestive heart failure), NYHA class I, acute on chronic, combined ([address] I50.43    Acute on chronic congestive heart failure ([address] I50.9    Acute on chronic respiratory failure with hypoxia ([address] J96.21    Pancytopenia ([address] D61.818    Iron deficiency anemia D50.9    Morbid obesity ([facility] E66.01    Primary hypertension I10    Chest pain R07.9       Isolation/Infection:   Isolation            No Isolation          Patient Infection Status       Infection Onset Added Last Indicated Last Indicated By Review Planned Expiration Resolved Resolved By    None active    Resolved    C-diff Rule Out 23 Clostridium difficile toxin/antigen (Ordered)   23 Rule-Out Test Resulted    COVID-19 (Rule Out) 23 Respiratory Panel, Molecular, with COVID-19 (Restricted: peds pts or suitable admitted adults) (Ordered)   23 Rule-Out Test

## 2023-05-03 ENCOUNTER — ANESTHESIA EVENT (OUTPATIENT)
Dept: ENDOSCOPY | Age: 73
DRG: 280 | End: 2023-05-03
Payer: MEDICARE

## 2023-05-03 ENCOUNTER — ANESTHESIA (OUTPATIENT)
Dept: ENDOSCOPY | Age: 73
DRG: 280 | End: 2023-05-03
Payer: MEDICARE

## 2023-05-03 VITALS
HEART RATE: 97 BPM | RESPIRATION RATE: 18 BRPM | WEIGHT: 286.25 LBS | OXYGEN SATURATION: 97 % | DIASTOLIC BLOOD PRESSURE: 60 MMHG | SYSTOLIC BLOOD PRESSURE: 113 MMHG | HEIGHT: 61 IN | TEMPERATURE: 97 F | BODY MASS INDEX: 54.04 KG/M2

## 2023-05-03 LAB
ABO + RH BLD: NORMAL
ANION GAP SERPL CALCULATED.3IONS-SCNC: 8 MMOL/L (ref 3–16)
BASOPHILS # BLD: 0 K/UL (ref 0–0.2)
BASOPHILS NFR BLD: 0.8 %
BLD GP AB SCN SERPL QL: NORMAL
BUN SERPL-MCNC: 34 MG/DL (ref 7–20)
CALCIUM SERPL-MCNC: 9.9 MG/DL (ref 8.3–10.6)
CHLORIDE SERPL-SCNC: 106 MMOL/L (ref 99–110)
CO2 SERPL-SCNC: 23 MMOL/L (ref 21–32)
CREAT SERPL-MCNC: 0.8 MG/DL (ref 0.6–1.2)
DEPRECATED RDW RBC AUTO: 16.1 % (ref 12.4–15.4)
EOSINOPHIL # BLD: 0.2 K/UL (ref 0–0.6)
EOSINOPHIL NFR BLD: 4 %
FERRITIN SERPL IA-MCNC: 395.5 NG/ML (ref 15–150)
FOLATE SERPL-MCNC: 7.51 NG/ML (ref 4.78–24.2)
GFR SERPLBLD CREATININE-BSD FMLA CKD-EPI: >60 ML/MIN/{1.73_M2}
GLUCOSE BLD-MCNC: 182 MG/DL (ref 70–99)
GLUCOSE BLD-MCNC: 188 MG/DL (ref 70–99)
GLUCOSE BLD-MCNC: 193 MG/DL (ref 70–99)
GLUCOSE SERPL-MCNC: 172 MG/DL (ref 70–99)
HAPTOGLOB SERPL-MCNC: 187 MG/DL (ref 30–200)
HCT VFR BLD AUTO: 25 % (ref 36–48)
HGB BLD-MCNC: 7.9 G/DL (ref 12–16)
IRON SATN MFR SERPL: 23 % (ref 15–50)
IRON SERPL-MCNC: 67 UG/DL (ref 37–145)
LYMPHOCYTES # BLD: 1 K/UL (ref 1–5.1)
LYMPHOCYTES NFR BLD: 21.4 %
MCH RBC QN AUTO: 30.9 PG (ref 26–34)
MCHC RBC AUTO-ENTMCNC: 31.7 G/DL (ref 31–36)
MCV RBC AUTO: 97.4 FL (ref 80–100)
MONOCYTES # BLD: 0.3 K/UL (ref 0–1.3)
MONOCYTES NFR BLD: 7.4 %
NEUTROPHILS # BLD: 3 K/UL (ref 1.7–7.7)
NEUTROPHILS NFR BLD: 66.4 %
PERFORMED ON: ABNORMAL
PLATELET # BLD AUTO: 92 K/UL (ref 135–450)
PMV BLD AUTO: 8 FL (ref 5–10.5)
POTASSIUM SERPL-SCNC: 4.8 MMOL/L (ref 3.5–5.1)
RBC # BLD AUTO: 2.57 M/UL (ref 4–5.2)
SODIUM SERPL-SCNC: 137 MMOL/L (ref 136–145)
TIBC SERPL-MCNC: 296 UG/DL (ref 260–445)
VIT B12 SERPL-MCNC: 597 PG/ML (ref 211–911)
WBC # BLD AUTO: 4.5 K/UL (ref 4–11)

## 2023-05-03 PROCEDURE — 6370000000 HC RX 637 (ALT 250 FOR IP): Performed by: INTERNAL MEDICINE

## 2023-05-03 PROCEDURE — 0DB98ZX EXCISION OF DUODENUM, VIA NATURAL OR ARTIFICIAL OPENING ENDOSCOPIC, DIAGNOSTIC: ICD-10-PCS | Performed by: INTERNAL MEDICINE

## 2023-05-03 PROCEDURE — 86900 BLOOD TYPING SEROLOGIC ABO: CPT

## 2023-05-03 PROCEDURE — 3700000000 HC ANESTHESIA ATTENDED CARE: Performed by: INTERNAL MEDICINE

## 2023-05-03 PROCEDURE — 94760 N-INVAS EAR/PLS OXIMETRY 1: CPT

## 2023-05-03 PROCEDURE — C9113 INJ PANTOPRAZOLE SODIUM, VIA: HCPCS | Performed by: INTERNAL MEDICINE

## 2023-05-03 PROCEDURE — 82746 ASSAY OF FOLIC ACID SERUM: CPT

## 2023-05-03 PROCEDURE — 6360000002 HC RX W HCPCS: Performed by: HOSPITALIST

## 2023-05-03 PROCEDURE — 82607 VITAMIN B-12: CPT

## 2023-05-03 PROCEDURE — 6370000000 HC RX 637 (ALT 250 FOR IP): Performed by: NURSE ANESTHETIST, CERTIFIED REGISTERED

## 2023-05-03 PROCEDURE — 2500000003 HC RX 250 WO HCPCS: Performed by: NURSE ANESTHETIST, CERTIFIED REGISTERED

## 2023-05-03 PROCEDURE — 3609012400 HC EGD TRANSORAL BIOPSY SINGLE/MULTIPLE: Performed by: INTERNAL MEDICINE

## 2023-05-03 PROCEDURE — 6360000002 HC RX W HCPCS: Performed by: ANESTHESIOLOGY

## 2023-05-03 PROCEDURE — 82728 ASSAY OF FERRITIN: CPT

## 2023-05-03 PROCEDURE — 6360000002 HC RX W HCPCS: Performed by: INTERNAL MEDICINE

## 2023-05-03 PROCEDURE — 36415 COLL VENOUS BLD VENIPUNCTURE: CPT

## 2023-05-03 PROCEDURE — 83540 ASSAY OF IRON: CPT

## 2023-05-03 PROCEDURE — 86850 RBC ANTIBODY SCREEN: CPT

## 2023-05-03 PROCEDURE — 2700000000 HC OXYGEN THERAPY PER DAY

## 2023-05-03 PROCEDURE — 3700000001 HC ADD 15 MINUTES (ANESTHESIA): Performed by: INTERNAL MEDICINE

## 2023-05-03 PROCEDURE — 94640 AIRWAY INHALATION TREATMENT: CPT

## 2023-05-03 PROCEDURE — 88342 IMHCHEM/IMCYTCHM 1ST ANTB: CPT

## 2023-05-03 PROCEDURE — 83550 IRON BINDING TEST: CPT

## 2023-05-03 PROCEDURE — 88305 TISSUE EXAM BY PATHOLOGIST: CPT

## 2023-05-03 PROCEDURE — 83010 ASSAY OF HAPTOGLOBIN QUANT: CPT

## 2023-05-03 PROCEDURE — 7100000000 HC PACU RECOVERY - FIRST 15 MIN: Performed by: INTERNAL MEDICINE

## 2023-05-03 PROCEDURE — 7100000001 HC PACU RECOVERY - ADDTL 15 MIN: Performed by: INTERNAL MEDICINE

## 2023-05-03 PROCEDURE — 2709999900 HC NON-CHARGEABLE SUPPLY: Performed by: INTERNAL MEDICINE

## 2023-05-03 PROCEDURE — 88341 IMHCHEM/IMCYTCHM EA ADD ANTB: CPT

## 2023-05-03 PROCEDURE — 85025 COMPLETE CBC W/AUTO DIFF WBC: CPT

## 2023-05-03 PROCEDURE — 86901 BLOOD TYPING SEROLOGIC RH(D): CPT

## 2023-05-03 PROCEDURE — 2580000003 HC RX 258: Performed by: INTERNAL MEDICINE

## 2023-05-03 PROCEDURE — 80048 BASIC METABOLIC PNL TOTAL CA: CPT

## 2023-05-03 PROCEDURE — 6360000002 HC RX W HCPCS: Performed by: NURSE ANESTHETIST, CERTIFIED REGISTERED

## 2023-05-03 PROCEDURE — 2580000003 HC RX 258: Performed by: NURSE ANESTHETIST, CERTIFIED REGISTERED

## 2023-05-03 RX ORDER — KETAMINE HCL IN NACL, ISO-OSM 100MG/10ML
SYRINGE (ML) INJECTION PRN
Status: DISCONTINUED | OUTPATIENT
Start: 2023-05-03 | End: 2023-05-03 | Stop reason: SDUPTHER

## 2023-05-03 RX ORDER — SUCCINYLCHOLINE/SOD CL,ISO/PF 200MG/10ML
SYRINGE (ML) INTRAVENOUS PRN
Status: DISCONTINUED | OUTPATIENT
Start: 2023-05-03 | End: 2023-05-03 | Stop reason: SDUPTHER

## 2023-05-03 RX ORDER — LIDOCAINE HYDROCHLORIDE 40 MG/ML
SOLUTION TOPICAL PRN
Status: DISCONTINUED | OUTPATIENT
Start: 2023-05-03 | End: 2023-05-03 | Stop reason: SDUPTHER

## 2023-05-03 RX ORDER — SODIUM CHLORIDE 9 MG/ML
INJECTION, SOLUTION INTRAVENOUS CONTINUOUS PRN
Status: DISCONTINUED | OUTPATIENT
Start: 2023-05-03 | End: 2023-05-03 | Stop reason: SDUPTHER

## 2023-05-03 RX ORDER — PROPOFOL 10 MG/ML
INJECTION, EMULSION INTRAVENOUS PRN
Status: DISCONTINUED | OUTPATIENT
Start: 2023-05-03 | End: 2023-05-03 | Stop reason: SDUPTHER

## 2023-05-03 RX ORDER — FUROSEMIDE 40 MG/1
60 TABLET ORAL DAILY
Qty: 60 TABLET | Refills: 0 | Status: SHIPPED | OUTPATIENT
Start: 2023-05-03

## 2023-05-03 RX ORDER — LIDOCAINE HYDROCHLORIDE 20 MG/ML
INJECTION, SOLUTION INFILTRATION; PERINEURAL PRN
Status: DISCONTINUED | OUTPATIENT
Start: 2023-05-03 | End: 2023-05-03 | Stop reason: SDUPTHER

## 2023-05-03 RX ORDER — DEXAMETHASONE SODIUM PHOSPHATE 4 MG/ML
INJECTION, SOLUTION INTRA-ARTICULAR; INTRALESIONAL; INTRAMUSCULAR; INTRAVENOUS; SOFT TISSUE PRN
Status: DISCONTINUED | OUTPATIENT
Start: 2023-05-03 | End: 2023-05-03 | Stop reason: SDUPTHER

## 2023-05-03 RX ORDER — ONDANSETRON 2 MG/ML
INJECTION INTRAMUSCULAR; INTRAVENOUS PRN
Status: DISCONTINUED | OUTPATIENT
Start: 2023-05-03 | End: 2023-05-03 | Stop reason: SDUPTHER

## 2023-05-03 RX ORDER — ALBUTEROL SULFATE 2.5 MG/3ML
2.5 SOLUTION RESPIRATORY (INHALATION) ONCE
Status: COMPLETED | OUTPATIENT
Start: 2023-05-03 | End: 2023-05-03

## 2023-05-03 RX ADMIN — Medication 140 MG: at 10:52

## 2023-05-03 RX ADMIN — ATORVASTATIN CALCIUM 40 MG: 40 TABLET, FILM COATED ORAL at 12:13

## 2023-05-03 RX ADMIN — TIOTROPIUM BROMIDE INHALATION SPRAY 2 PUFF: 3.12 SPRAY, METERED RESPIRATORY (INHALATION) at 07:44

## 2023-05-03 RX ADMIN — PHENYLEPHRINE HYDROCHLORIDE 50 MCG: 10 INJECTION INTRAVENOUS at 10:59

## 2023-05-03 RX ADMIN — SODIUM CHLORIDE, PRESERVATIVE FREE 10 ML: 5 INJECTION INTRAVENOUS at 07:58

## 2023-05-03 RX ADMIN — ISOSORBIDE DINITRATE 10 MG: 10 TABLET ORAL at 12:20

## 2023-05-03 RX ADMIN — PROPOFOL 130 MG: 10 INJECTION, EMULSION INTRAVENOUS at 10:51

## 2023-05-03 RX ADMIN — SODIUM CHLORIDE: 9 INJECTION, SOLUTION INTRAVENOUS at 09:31

## 2023-05-03 RX ADMIN — ONDANSETRON 4 MG: 2 INJECTION INTRAMUSCULAR; INTRAVENOUS at 08:10

## 2023-05-03 RX ADMIN — QUETIAPINE FUMARATE 100 MG: 100 TABLET ORAL at 12:13

## 2023-05-03 RX ADMIN — ASPIRIN 81 MG: 81 TABLET, CHEWABLE ORAL at 12:14

## 2023-05-03 RX ADMIN — Medication 20 MG: at 10:51

## 2023-05-03 RX ADMIN — IRON SUCROSE 200 MG: 20 INJECTION, SOLUTION INTRAVENOUS at 12:57

## 2023-05-03 RX ADMIN — ONDANSETRON 4 MG: 2 INJECTION INTRAMUSCULAR; INTRAVENOUS at 10:55

## 2023-05-03 RX ADMIN — ATENOLOL 25 MG: 25 TABLET ORAL at 12:14

## 2023-05-03 RX ADMIN — LIDOCAINE HYDROCHLORIDE 2 ML: 40 SOLUTION TOPICAL at 10:53

## 2023-05-03 RX ADMIN — DEXAMETHASONE SODIUM PHOSPHATE 4 MG: 4 INJECTION, SOLUTION INTRAMUSCULAR; INTRAVENOUS at 10:57

## 2023-05-03 RX ADMIN — PANTOPRAZOLE SODIUM 40 MG: 40 INJECTION, POWDER, LYOPHILIZED, FOR SOLUTION INTRAVENOUS at 08:10

## 2023-05-03 RX ADMIN — LIDOCAINE HYDROCHLORIDE 80 MG: 20 INJECTION, SOLUTION INFILTRATION; PERINEURAL at 10:51

## 2023-05-03 RX ADMIN — ARIPIPRAZOLE 5 MG: 5 TABLET ORAL at 12:14

## 2023-05-03 RX ADMIN — ALBUTEROL SULFATE 2.5 MG: 2.5 SOLUTION RESPIRATORY (INHALATION) at 09:40

## 2023-05-03 RX ADMIN — Medication 50 MCG: at 12:20

## 2023-05-03 RX ADMIN — Medication 10 MG: at 11:01

## 2023-05-03 ASSESSMENT — PAIN DESCRIPTION - LOCATION
LOCATION: THROAT;OTHER (COMMENT)

## 2023-05-03 ASSESSMENT — PAIN SCALES - GENERAL
PAINLEVEL_OUTOF10: 6
PAINLEVEL_OUTOF10: 6
PAINLEVEL_OUTOF10: 5
PAINLEVEL_OUTOF10: 0

## 2023-05-03 ASSESSMENT — PAIN - FUNCTIONAL ASSESSMENT: PAIN_FUNCTIONAL_ASSESSMENT: NONE - DENIES PAIN

## 2023-05-03 NOTE — PROGRESS NOTES
05/02/23 0422   RT Protocol   History Pulmonary Disease 0   Respiratory pattern 0   Breath sounds 2   Cough 0   Bronchodilator Assessment Score 2
Arrived to place PIV for pt. Ultrasound guided 20G PIV placed in the Right forearm without difficulty. Pt did complain of pain but arm is sore from multiple blood draws and unsuccessful previous attempts. Blood return achieved and line flushed without resistance or pain. Thank you for allowing our care and services.
CLINICAL PHARMACY NOTE: MEDS TO BEDS    Total # of Prescriptions Filled: 2   The following medications were delivered to the patient:  Aspirin 81mg  Lokelma 10gm packets    Additional Documentation:    Medications were picked up in the Outpatient Pharmacy by patient's friend    Jose Cruz Howard
[facility] [address]   Phone: [phone]    Physical Therapy    [] Initial Evaluation            [x] Daily Treatment Note         [] Discharge Summary      Patient: Jewel Majano   : 1950   MRN: [id number]   Date of Service:  2023  Admitting Diagnosis: Chest pain  Current Admission Summary: Jewel Majano is a 81 y.o. female who presented to the hospital with complaints of chest pain. I have been asked to provide consultation regarding further management and testing. She reports that since October of last year she has been out of breath with associated chest discomfort with any activity. The patient reports that last year she could go up and down the stairs 6 or 7 times without stopping. She reports that she has to sit down and rest with exertion. She reports that she can no longer do the dishes without having to rest. She states that the discomfort radiates to her shoulder. She reports associated nausea without diaphoresis. The discomfort somewhat worsens with a breath. It is predictable. She states that she has been gaining weight with orthopnea and peripheral edema. She isn't sure if she snores. She has been wearing oxygen since she had her pulmonary embolism. She notes that her current symptoms are different than with her PE  Found to have NSTEMI, pending cardiac cath  Past Medical History:  has a past medical history of Asthma, Back pain, Bipolar 1 disorder ([address] Diabetes mellitus ([address] GERD (gastroesophageal reflux disease), Hyperlipidemia, Hypertension, and Tachycardia. Past Surgical History:  has a past surgical history that includes Cholecystectomy; Leg Surgery; Appendectomy; Endometrial ablation; and Breast surgery (Left, ). Discharge Recommendations: Jewel Majano scored a 22/24 on the AM-PAC short mobility form. At this time, no further PT is recommended upon discharge due to anticipated return to baseline function.   Recommend
[address] Daily Progress Note      Admit Date:  4/28/2023    Chief Complaint   Patient presents with    Chest Pain     PT c/o chest tightness that started around 6pm. Pt has had cough and increased SOB. Pt denies being around anyone sick. Hx of COPD, CHF        Subjective:  Ms. Una Barnard denies exertional chest pain, SOB/SALVADOR, PND, palpitations, light-headedness, or edema.      Objective:   BP (!) 108/50   Pulse 72   Temp 97 °F (36.1 °C) (Temporal)   Resp 17   Ht 5' 1\" (1.549 m)   Wt 285 lb (129.3 kg)   SpO2 95%   BMI 53.85 kg/m²     Intake/Output Summary (Last 24 hours) at 5/1/2023 1358  Last data filed at 5/1/2023 0400  Gross per 24 hour   Intake 895 ml   Output 800 ml   Net 95 ml       TELEMETRY: Sinus     Physical Exam:  General:  Awake, alert, oriented x 3, NAD  Skin:  Warm and dry  Neck:  JVD flat  Chest:  normal air entry  Cardiovascular:  RRR S1S2, no S3, no mrmr  Abdomen:  Soft, ND, NT, No HSM  Extremities:  No edema    Medications:    ascorbic acid  500 mg Oral Daily    QUEtiapine  200 mg Oral Nightly    atenolol  25 mg Oral Daily    montelukast  10 mg Oral Nightly    QUEtiapine  100 mg Oral Daily    tiotropium  2 puff Inhalation Daily    vitamin B-12  50 mcg Oral Daily    sodium chloride flush  5-40 mL IntraVENous 2 times per day    insulin lispro  0-4 Units SubCUTAneous TID WC    insulin lispro  0-4 Units SubCUTAneous Nightly    ARIPiprazole  5 mg Oral Daily    aspirin  81 mg Oral Daily    atorvastatin  40 mg Oral Daily    isosorbide dinitrate  10 mg Oral q8h      sodium bicarbonate infusion 50 mL/hr at 05/01/23 1040    sodium chloride 80 mL/hr at 05/01/23 1036    sodium chloride      dextrose       nitroGLYCERIN, albuterol sulfate HFA, sodium chloride flush, sodium chloride, ondansetron **OR** ondansetron, polyethylene glycol, acetaminophen **OR** acetaminophen, dextrose bolus **OR** dextrose bolus, glucagon (rDNA), dextrose    Lab Data:  CBC:   Recent Labs     04/29/23  1121
[facility]        Progress note                Reason for Consult:  CAYLA, Hyponatremia, Hyperkalemia  Requesting Physician:  Dr. Sol Jamison:  Chest pain            Patient awaiting diagnostic cardiac catheter atypical chest pain. Potassium remains refractory-5.8. Creatinine is improved to 1.2. High potassium food noted on tray    Repeat Lokelma 15 g. Begin bicarbonate infusion 150 mEq of bicarbonate. Avoid fluid overload. Continue on low potassium diet. Repeat renal panel at 12 noon. If potassium is less than 5.2 can proceed for diagnostic cardiac cath. Discussed with nursing. Patient updated. High complexity. HISTORY OF PRESENT ILLNESS:                Althea Doll  is 81 y.o. y.o. female with significant past medical history of Bipolar D/O, DM, HLD, HTN, GERD who presents with chest pain. I am asked to see the patient since crea has increased to 1.4 from 1.2 with BUN higher at 55 too. K has increased from 5 to 5.7 with  HCO3 of 21. Na slightly low at 133. Lowest sbp in the 80's. No recent contrast exposure. On Lasix, Jardiance, SPLT and Lisinopril as outpatient. She also has received some doses of these meds here in the hospital.   On 3/30/23 crea 0.8. No vomiting nor diarrhea. On 2L O2 NC chronically. No change in urine output. Past Medical History:     has a past medical history of Asthma, Back pain, Bipolar 1 disorder ([address] Diabetes mellitus ([address] GERD (gastroesophageal reflux disease), Hyperlipidemia, Hypertension, and Tachycardia. Past Surgical History:     has a past surgical history that includes Cholecystectomy; Leg Surgery; Appendectomy; Endometrial ablation; and Breast surgery (Left, 2021).    Current Medications:    Current Facility-Administered Medications: 0.45 % sodium chloride infusion, , IntraVENous, Continuous  ascorbic acid (VITAMIN C) tablet 500 mg, 500 mg, Oral, Daily  QUEtiapine
Patient returned to [phone] from ENDO. Alert and oriented, VSS.
Per , the 1215 labs  ferritin, etc. were added on and sent to [place] fo processing
Per Dr. Rachell Reynoso, it is ok to discharge pt without occult stool performed
Pharmacy Discharge Counseling     Edison Pierson has a discharge order pended. Pharmacy has reviewed the medication list and provided counseling on medications including, but not limited to, new medications, dose and administration, and side effects. Medications discussed include:  - Patient instructed to stop taking metformin, lisinopril, and spironolactone  -Start taking aspirin 81 mg daily and Lokelma 10 g daily x10 days    The Patient was educated and had no further questions at this time.     Please call with any questions,    Minerva Olmedo PharmD  PGY-1 Pharmacy Resident  [id number]
Pt arrived from ENDO to PACU bay 8. Reported received from ENDO, RN/ CRNA staff. Pt is arousable to voice. Pt is simple mask at 6-L, NSR, and VSS. Will continue to monitor.
Pt stable and able to be transferred from PACU to room 5918. A&O , VSS, with no complaints at this time. Unit called and notified that pt is being transferred out of PACU and to room.
Pt transferred to room 5918 at this time. A&O with no signs of distress. Tele on, with visual on monitor, HR of 95. Report given to Beatriz IKNNEY. V/u and denies questions or further needs at this time.
Stool sent for occult stool, black  stool    1530  Occult positive  Dr. Segundo Goods informed, spoke to pt, discharge cancelled, GI consulted, NPO at [facility], lokelma 10mg once
Teaching / education initiated regarding perioperative experience, expectations, and pain management during stay. Patient verbalized understanding.
V2.0    [facility] Progress Note      Name:  Burney Bernheim /Age/Sex: 1950  (81 y.o. female)   MRN & CSN:  [id number] & [id number] Encounter Date/Time: 2023 2:16 PM EDT   Location:  [id number]-3666/9036-20 PCP: Tasneem Metcalf MD     Attending:Margarito Huerta MD       Hospital Day: 4    Assessment and Recommendations   Burney Bernheim is a 81 y.o. female with pmh of  who presents with Chest pain      Plan:   Non-STEMI  -Treated with heparin drip  -Plan is to start heparin drip today given consistent low platelets  -Initial plan was to have left heart cath this admission but due to renal function is a persistent hyperkalemia and thrombocytopenia was not performed left heart cath right now per cardiology will be arranged outpatient    Hyperkalemia with acute kidney injury  -Given Lokelma. Potassium still high  -We will continue to trend for now await nephrology recommendation    Anemia  On Venofer. Heart failure  -Now on dry side probably over diuresed hold off on Lasix and spironolactone for now    Chronic respiratory failure          Diet ADULT DIET; Regular; Low Potassium (Less than 3000 mg/day)   DVT Prophylaxis [] Lovenox, []  Heparin, [] SCDs, [] Ambulation,  [] Eliquis, [] Xarelto   Code Status Full Code   Disposition From: Home  Expected Disposition: Home  Estimated Date of Discharge: Hopefully home tomorrow  Patient requires continued admission due to hyperkalemia   Surrogate Decision Maker/ POA       Personally reviewed Lab Studies and Imaging             Subjective:     Chief Complaint: Chest pain shortness of breath    Burney Bernheim is a 81 y.o. female who presents with chest pain shortness of breath is better. Discussed potassium levels      Review of Systems:      Pertinent positives and negatives discussed in HPI    Objective:      Intake/Output Summary (Last 24 hours) at 2023 1416  Last data filed at 2023 0400  Gross per 24 hour   Intake 895 ml   Output 800 ml   Net 95 ml
CINTIA, increased medial lateral sway, decreased arm swing (B), cautious movement pattern initially  Comments:  SpO2 94% on 2L. Pt states her O2 line doesn't reach all the way through her house and she has to change concentrators, educated on asking for additional O2 line or picking a more central location for her concentrator. Stair Mobility  Stair mobility not completed on this date. Comments:    Balance  Static Sitting Balance: fair (+): maintains balance at SBA/supervision without use of UE support  Dynamic Sitting Balance: fair (+): maintains balance at SBA/supervision without use of UE support  Static Standing Balance: fair (+): maintains balance at SBA/supervision without use of UE support  Dynamic Standing Balance: fair (+): maintains balance at SBA/supervision without use of UE support  Comments: Pt stood 2 + 3 minutes with unilateral-bilateral UE support for pericare during shower. Pt stood additional 45 sec without UE support, no sway during preparation for walking. Other Therapeutic Interventions  Pt assisted with shower. RN in and took over care for additional ADLs. Functional Outcomes  AM-PAC Inpatient Mobility Raw Score : 19              Cognition  WFL  Orientation:    alert and oriented x 4  Command Following:   [facility]    Education  Barriers To Learning: none  Patient Education: patient educated on goals, PT role and benefits, plan of care, general safety, functional mobility training, family education, disease specific education, transfer training, discharge recommendations  Learning Assessment:  patient verbalizes and demonstrates understanding    Assessment  Activity Tolerance: Good, improved tolerance to activity this date. Pt fatigued with longer household distance but recovers well with short seated rest breaks.    Impairments Requiring Therapeutic Intervention: decreased functional mobility, decreased endurance  Prognosis: good  Clinical Assessment: Patient demonstrated improved tolerance
XR CHEST (2 VW)    Result Date: 4/28/2023  EXAMINATION: TWO XRAY VIEWS OF THE CHEST 4/28/2023 6:04 am COMPARISON: 03/20/2023 HISTORY: ORDERING SYSTEM PROVIDED HISTORY: chest tightness, 2 lb weight gain, cough, no fever TECHNOLOGIST PROVIDED HISTORY: Reason for exam:->chest tightness, 2 lb weight gain, cough, no fever Reason for Exam:  chest tightness, 2 lb weight gain, cough, no fever Relevant Medical/Surgical History: previous tachycardia,asthma,hld and htn FINDINGS: Atherosclerosis is identified within the thoracic aorta. Cardiac size at the upper limits of normal.  Prominent haziness at the left lung base laterally felt to represent a prominent pericardial fat pad as demonstrated on recent CT imaging on 03/20/2023. No new infiltrate is identified. No acute osseous abnormality is seen. Multilevel degenerative changes noted within the spine. No acute parenchymal process is identified. CBC:   Recent Labs     04/30/23  1137 05/01/23  0420 05/02/23  1032   WBC 5.8 5.8 4.6   HGB 9.3* 8.6* 7.6*   * 92* 96*     BMP:    Recent Labs     04/30/23  1137 05/01/23  0420 05/01/23  1242 05/02/23  1032   * 133*  --  138   K 5.7* 5.8* 5.7* 5.2*    104  --  104   CO2 21 16*  --  27   BUN 55* 61*  --  46*   CREATININE 1.4* 1.2  --  0.9   GLUCOSE 285* 165*  --  300*     Hepatic: No results for input(s): AST, ALT, ALB, BILITOT, ALKPHOS in the last 72 hours. Lipids:   Lab Results   Component Value Date/Time    CHOL 120 03/21/2023 04:25 AM    HDL 49 03/21/2023 04:25 AM    TRIG 102 03/21/2023 04:25 AM     Hemoglobin A1C:   Lab Results   Component Value Date/Time    LABA1C 6.7 03/20/2023 10:03 AM     TSH:   Lab Results   Component Value Date/Time    TSH 2.29 03/20/2023 10:03 AM     Troponin: No results found for: TROPONINT  Lactic Acid: No results for input(s): LACTA in the last 72 hours. BNP: No results for input(s): PROBNP in the last 72 hours.   UA:  Lab Results   Component Value Date/Time    NITRU
16.1 05/03/2023 04:51 AM    PLT 92 05/03/2023 04:51 AM    MPV 8.0 05/03/2023 04:51 AM     BMP:   Lab Results   Component Value Date/Time     05/03/2023 04:51 AM    K 4.8 05/03/2023 04:51 AM    K 5.0 04/28/2023 05:20 AM     05/03/2023 04:51 AM    CO2 23 05/03/2023 04:51 AM    BUN 34 05/03/2023 04:51 AM    CREATININE 0.8 05/03/2023 04:51 AM    CALCIUM 9.9 05/03/2023 04:51 AM    LABGLOM >60 05/03/2023 04:51 AM    GLUCOSE 172 05/03/2023 04:51 AM   Magnesium:    Lab Results   Component Value Date/Time    MG 2.10 03/25/2023 05:43 AM   Phosphorus:  No results found for: PHOS    EF 55-60%    UA pending    Cxray-no acute process    Alb 4.5    HS troponin 17    IMPRESSION/RECOMMENDATIONS:     CAYLA-baseline crea 0.8-1.2. Non-oliguric. Probably due to prerenal azotemia in setting of ACEI+MRA+Jardiance+Lasix. Hold offending meds. Does have risk for contrast nephropathy. Patient understands risks(including possible HD) and benefit of cardiac cath and agreeable to proceed. NSS at lower rate with edema. Hyponatremia-due to increased ADH from lower bp. Follow with NSS. Hyperkalemia--due to decreased GFR+ACEI+MRA. Low K diet. Will add another dose of Lokelma. Hypotension-NSS. Hold RAAS blockade/diuretic/SGLT-2 inhibition. Chest Pain-planning for cardiac cath. DM-per Medicine  Anemia-follow Hgb    Thank you for the consult. We will follow this patient along the hospitalization.     Gail Ziegler MD
CREATININE 1.2 05/01/2023 04:20 AM    CALCIUM 9.1 05/01/2023 04:20 AM    LABGLOM 49 05/01/2023 04:20 AM    GLUCOSE 165 05/01/2023 04:20 AM   Magnesium:    Lab Results   Component Value Date/Time    MG 2.10 03/25/2023 05:43 AM   Phosphorus:  No results found for: PHOS    EF 55-60%    UA pending    Cxray-no acute process    Alb 4.5    HS troponin 17    IMPRESSION/RECOMMENDATIONS:     CAYLA-baseline crea 0.8-1.2. Non-oliguric. Probably due to prerenal azotemia in setting of ACEI+MRA+Jardiance+Lasix. Hold offending meds. Does have risk for contrast nephropathy. Patient understands risks(including possible HD) and benefit of cardiac cath and agreeable to proceed. NSS at lower rate with edema. Hyponatremia-due to increased ADH from lower bp. Follow with NSS. Hyperkalemia--due to decreased GFR+ACEI+MRA. Low K diet. Will add another dose of Lokelma. Hypotension-NSS. Hold RAAS blockade/diuretic/SGLT-2 inhibition. Chest Pain-planning for cardiac cath. DM-per Medicine  Anemia-follow Hgb    Thank you for the consult. We will follow this patient along the hospitalization.     Samy Dye MD

## 2023-05-03 NOTE — ANESTHESIA PRE PROCEDURE
(+) asthma:                           ROS comment: 2 L NC at home RTC  PE comment: Small scattered wheeze. Patient says this is always present Cardiovascular:    (+) hypertension:, CHF:,         Rhythm: regular  Rate: abnormal                 ROS comment: Left Ventricular Ejection Fraction 58   LVEF MODALITY ECHO         Narrative & Impression      Transthoracic Echocardiography Report (TTE)      Demographics      Patient Name      DRE VILCHIS      Date of Study     04/29/2023          Gender              Female      Patient Number    [id number]          Date of Birth       1950      Visit Number      [id number]           Age                 81 year(s)      Accession Number  [id number]          Room Number         3887      Corporate ID      [id number]            Sonographer         Peg Yoon, RVT,                                                             RDCS      Ordering          Alma Reno DO  Interpreting        Richard Bhat,   Physician                             Physician           MD, [facility]     Procedure     Type of Study      TTE procedure:ECHOCARDIOGRAM LIMITED. Procedure Date  Date: 04/29/2023 Start: 09:38 AM     Study Location: Portable  Technical Quality: Limited visualization due to body habitus.     Indications:Coronary artery disease.     Patient Status: Routine     Contrast Medium: Definity.     Height: 61 inches Weight: 285.01 pounds BSA: 2.2 m2 BMI: 53.85 kg/m2     BP: 121/71 mmHg      Conclusions      Summary   Limited exam per Dr. Eamon Ham. Left ventricular systolic function appears normal with an ejection fraction   that is visually estimated to be 55-60%. No regional wall motion abnormalities noted. Definity imaging agent administered to better visualize the endocardial   border. The right ventricle appears normal in size and function. No pericardial effusion noted.    Pericardial fat pad

## 2023-05-03 NOTE — ANESTHESIA POSTPROCEDURE EVALUATION
Department of Anesthesiology  Postprocedure Note    Patient: Timi Larsen  MRN: [id number]  YOB: 1950  Date of evaluation: 5/3/2023      Procedure Summary     Date: 05/03/23 Room / Location: [address] [place] [address]    Anesthesia Start: 1039 Anesthesia Stop: 1114    Procedure: EGD BIOPSY (Abdomen) Diagnosis:       Anemia, unspecified type      (Anemia, unspecified type [D64.9])    Surgeons: Catalina Bowling MD Responsible Provider: Ave Rdz MD    Anesthesia Type: general ASA Status: 3          Anesthesia Type: No value filed. Jennifer Phase I: Jennifer Score: 9    Jennifer Phase II:        Anesthesia Post Evaluation    Patient location during evaluation: PACU  Patient participation: complete - patient participated  Level of consciousness: awake  Airway patency: patent  Nausea & Vomiting: no vomiting  Complications: no  Cardiovascular status: hemodynamically stable  Respiratory status: acceptable  Hydration status: euvolemic  There was medical reason for not using a multimodal analgesia pain management approach.

## 2023-05-03 NOTE — DISCHARGE SUMMARY
input(s): LACTA in the last 72 hours. BNP: No results for input(s): PROBNP in the last 72 hours. UA:  Lab Results   Component Value Date/Time    NITRU Negative 04/30/2023 07:40 PM    COLORU Yellow 04/30/2023 07:40 PM    PHUR 5.5 04/30/2023 07:40 PM    WBCUA 14 04/30/2023 07:40 PM    RBCUA 0 04/30/2023 07:40 PM    BACTERIA None Seen 04/30/2023 07:40 PM    CLARITYU Clear 04/30/2023 07:40 PM    SPECGRAV 1.015 04/30/2023 07:40 PM    LEUKOCYTESUR SMALL 04/30/2023 07:40 PM    UROBILINOGEN 0.2 04/30/2023 07:40 PM    BILIRUBINUR Negative 04/30/2023 07:40 PM    BLOODU Negative 04/30/2023 07:40 PM    GLUCOSEU >=1000 04/30/2023 07:40 PM    KETUA Negative 04/30/2023 07:40 PM     Urine Cultures:   Lab Results   Component Value Date/Time    LABURIN  03/26/2023 11:30 AM     >50,000 CFU/ml  Multiple organisms isolated, no predominance. Culture  indicates probable contamination. Please review colony count  and clinical indications to determine if a repeat culture is  necessary. No further workup to be done.        Blood Cultures: No results found for: BC  No results found for: BLOODCULT2  Organism:   Lab Results   Component Value Date/Time    ORG Coronavirus 229E detected by PCR 03/20/2023 06:36 PM       Time Spent Discharging patient 45 minutes    Electronically signed by Roselyn King MD on 5/3/2023 at 6:29 PM

## 2023-05-03 NOTE — BRIEF OP NOTE
Brief Postoperative Note - Full Note in Chart Review/Procedures tab       Patient: Cindi Robin  YOB: 1950  MRN: [id number]    Date of Procedure: 5/3/2023    Pre-Op Diagnosis Codes:     * Anemia, unspecified type [D64.9]    Post-Op Diagnosis: Same       Procedure(s):  EGD BIOPSY    Surgeon(s):  Jennyfer Arias MD    Assistant:  * No surgical staff found *    Anesthesia: General    Estimated Blood Loss (mL): Minimal    Complications: None    Specimens:   ID Type Source Tests Collected by Time Destination   A : duodenal submucosal lesion biopsy  Tissue Duodenum SURGICAL PATHOLOGY Jennyfer Arias MD 5/3/2023 1102        Implants:  * No implants in log *      Drains: * No LDAs found *    Findings:   Small duodenal lipoma  Otherwise normal EGD - no source of melena/anemia noted    Rec:  General diet  Continue present medications  Await biopsies  Check iron studies, ferritin. Would d/c po iron and start course of Venofer 200 mg IV daily x 5 doses. Recommended colonoscopy, but will need 2 day prep -- she is considering. OK to discharge from GI standpoint  I can see for f/u of this as outpatient as needed.       Electronically signed by Jennyfer Arias MD on 5/3/2023 at 11:17 AM

## 2023-05-18 PROBLEM — E78.5 HLD (HYPERLIPIDEMIA): Status: ACTIVE | Noted: 2023-05-18

## 2023-05-18 NOTE — PROGRESS NOTES
[address]   Cardiac Evaluation      Patient: Hina Ocasio  YOB: 1950         Chief Complaint   Patient presents with    Follow-Up from Hospital    Shortness of Breath    Dizziness    Edema        Referring provider: Erma Alanis MD    History of Present Illness:   Hina Ocasio is a 81 y.o. female presenting in hospital follow up to [address]. She was hospitalized at the end of April with chest pain. She was not a candidate for [facility] at the time d/t renal impairment. Today she is here with her daughter. She is having shortness of breath, on home O2. Had chest pressure that brought her to the hospital. Has not recurred since leaving the hospital.     With regard to medication therapy he/she has been compliant with prescribed regimen and has tolerated therapy to date. Past Medical History:   has a past medical history of Asthma, Back pain, Bipolar 1 disorder ([facility] [address] Diabetes mellitus ([facility] [address] GERD (gastroesophageal reflux disease), Hyperlipidemia, Hypertension, and Tachycardia. Surgical History:   has a past surgical history that includes Cholecystectomy; Leg Surgery; Appendectomy; Endometrial ablation; Breast surgery (Left, 2021); and Upper gastrointestinal endoscopy (N/A, 5/3/2023).      Current Outpatient Medications   Medication Sig Dispense Refill    furosemide (LASIX) 40 MG tablet Take 1.5 tablets by mouth daily 60 tablet 0    aspirin 81 MG chewable tablet Take 1 tablet by mouth daily 30 tablet 3    empagliflozin (JARDIANCE) 10 MG tablet Take 1 tablet by mouth daily 90 tablet 0    ferrous sulfate (IRON 325) 325 (65 Fe) MG tablet Take 1 tablet by mouth daily (with breakfast)      vitamin B-12 (CYANOCOBALAMIN) 100 MCG tablet Take 10 tablets by mouth Once a week on Wednesdays      TRULICITY 1.5 QF/1.5MR SC injection       LEVEMIR FLEXTOUCH 100 UNIT/ML injection pen Pt takes 30 units mornings only      tiotropium (SPIRIVA RESPIMAT) 2.5 MCG/ACT AERS inhaler Inhale 2

## 2023-05-18 NOTE — PATIENT INSTRUCTIONS
Follow up with your pulmonologist, try to move appointment sooner  Follow up with your PCP  Follow up with ÁLVARO Green in 3 months   Call if chest pain returns

## 2023-05-19 ENCOUNTER — APPOINTMENT (OUTPATIENT)
Dept: GENERAL RADIOLOGY | Age: 73
End: 2023-05-19
Payer: MEDICARE

## 2023-05-19 ENCOUNTER — OFFICE VISIT (OUTPATIENT)
Dept: CARDIOLOGY CLINIC | Age: 73
End: 2023-05-19
Payer: MEDICARE

## 2023-05-19 ENCOUNTER — HOSPITAL ENCOUNTER (EMERGENCY)
Age: 73
Discharge: HOME | End: 2023-05-19
Attending: EMERGENCY MEDICINE
Payer: MEDICARE

## 2023-05-19 VITALS
BODY MASS INDEX: 52.53 KG/M2 | RESPIRATION RATE: 20 BRPM | WEIGHT: 278 LBS | OXYGEN SATURATION: 95 % | SYSTOLIC BLOOD PRESSURE: 137 MMHG | DIASTOLIC BLOOD PRESSURE: 78 MMHG | HEART RATE: 93 BPM | TEMPERATURE: 98.3 F

## 2023-05-19 VITALS
OXYGEN SATURATION: 93 % | SYSTOLIC BLOOD PRESSURE: 98 MMHG | HEART RATE: 97 BPM | DIASTOLIC BLOOD PRESSURE: 58 MMHG | BODY MASS INDEX: 52.64 KG/M2 | HEIGHT: 61 IN | WEIGHT: 278.8 LBS

## 2023-05-19 DIAGNOSIS — R07.9 CHEST PAIN, UNSPECIFIED TYPE: Primary | ICD-10-CM

## 2023-05-19 DIAGNOSIS — Z79.4 TYPE 2 DIABETES MELLITUS WITH HYPERGLYCEMIA, WITH LONG-TERM CURRENT USE OF INSULIN (HCC): Primary | ICD-10-CM

## 2023-05-19 DIAGNOSIS — E78.5 HYPERLIPIDEMIA, UNSPECIFIED HYPERLIPIDEMIA TYPE: ICD-10-CM

## 2023-05-19 DIAGNOSIS — I50.43 CHF (CONGESTIVE HEART FAILURE), NYHA CLASS I, ACUTE ON CHRONIC, COMBINED (HCC): ICD-10-CM

## 2023-05-19 DIAGNOSIS — E11.65 TYPE 2 DIABETES MELLITUS WITH HYPERGLYCEMIA, WITH LONG-TERM CURRENT USE OF INSULIN (HCC): Primary | ICD-10-CM

## 2023-05-19 LAB
ALBUMIN SERPL-MCNC: 4.5 G/DL (ref 3.4–5)
ALBUMIN/GLOB SERPL: 1.6 {RATIO} (ref 1.1–2.2)
ALP SERPL-CCNC: 96 U/L (ref 40–129)
ALT SERPL-CCNC: 22 U/L (ref 10–40)
ANION GAP SERPL CALCULATED.3IONS-SCNC: 15 MMOL/L (ref 3–16)
AST SERPL-CCNC: 23 U/L (ref 15–37)
BASOPHILS # BLD: 0.1 K/UL (ref 0–0.2)
BASOPHILS NFR BLD: 0.9 %
BILIRUB SERPL-MCNC: 0.7 MG/DL (ref 0–1)
BILIRUB UR QL STRIP.AUTO: NEGATIVE
BUN SERPL-MCNC: 9 MG/DL (ref 7–20)
CALCIUM SERPL-MCNC: 9.4 MG/DL (ref 8.3–10.6)
CHLORIDE SERPL-SCNC: 99 MMOL/L (ref 99–110)
CLARITY UR: CLEAR
CO2 SERPL-SCNC: 27 MMOL/L (ref 21–32)
COLOR UR: YELLOW
CREAT SERPL-MCNC: 0.9 MG/DL (ref 0.6–1.2)
DEPRECATED RDW RBC AUTO: 17.6 % (ref 12.4–15.4)
EKG ATRIAL RATE: 86 BPM
EKG DIAGNOSIS: NORMAL
EKG P AXIS: 31 DEGREES
EKG P-R INTERVAL: 126 MS
EKG Q-T INTERVAL: 362 MS
EKG QRS DURATION: 80 MS
EKG QTC CALCULATION (BAZETT): 433 MS
EKG R AXIS: -5 DEGREES
EKG T AXIS: 50 DEGREES
EKG VENTRICULAR RATE: 86 BPM
EOSINOPHIL # BLD: 0.2 K/UL (ref 0–0.6)
EOSINOPHIL NFR BLD: 3.5 %
GFR SERPLBLD CREATININE-BSD FMLA CKD-EPI: >60 ML/MIN/{1.73_M2}
GLUCOSE BLD-MCNC: 164 MG/DL (ref 70–99)
GLUCOSE BLD-MCNC: 244 MG/DL (ref 70–99)
GLUCOSE SERPL-MCNC: 244 MG/DL (ref 70–99)
GLUCOSE UR STRIP.AUTO-MCNC: >=1000 MG/DL
HCT VFR BLD AUTO: 32.2 % (ref 36–48)
HGB BLD-MCNC: 10.4 G/DL (ref 12–16)
HGB UR QL STRIP.AUTO: NEGATIVE
KETONES UR STRIP.AUTO-MCNC: NEGATIVE MG/DL
LEUKOCYTE ESTERASE UR QL STRIP.AUTO: NEGATIVE
LYMPHOCYTES # BLD: 1.2 K/UL (ref 1–5.1)
LYMPHOCYTES NFR BLD: 18.3 %
MAGNESIUM SERPL-MCNC: 1.9 MG/DL (ref 1.8–2.4)
MCH RBC QN AUTO: 31.3 PG (ref 26–34)
MCHC RBC AUTO-ENTMCNC: 32.2 G/DL (ref 31–36)
MCV RBC AUTO: 97.3 FL (ref 80–100)
MONOCYTES # BLD: 0.4 K/UL (ref 0–1.3)
MONOCYTES NFR BLD: 6.5 %
NEUTROPHILS # BLD: 4.7 K/UL (ref 1.7–7.7)
NEUTROPHILS NFR BLD: 70.8 %
NITRITE UR QL STRIP.AUTO: NEGATIVE
NT-PROBNP SERPL-MCNC: 75 PG/ML (ref 0–124)
PERFORMED ON: ABNORMAL
PERFORMED ON: ABNORMAL
PH UR STRIP.AUTO: 5 [PH] (ref 5–8)
PLATELET # BLD AUTO: 162 K/UL (ref 135–450)
PMV BLD AUTO: 8.6 FL (ref 5–10.5)
POTASSIUM SERPL-SCNC: 3.3 MMOL/L (ref 3.5–5.1)
PROT SERPL-MCNC: 7.4 G/DL (ref 6.4–8.2)
PROT UR STRIP.AUTO-MCNC: NEGATIVE MG/DL
RBC # BLD AUTO: 3.31 M/UL (ref 4–5.2)
SODIUM SERPL-SCNC: 141 MMOL/L (ref 136–145)
SP GR UR STRIP.AUTO: 1.01 (ref 1–1.03)
TROPONIN, HIGH SENSITIVITY: 28 NG/L (ref 0–14)
UA COMPLETE W REFLEX CULTURE PNL UR: ABNORMAL
UA DIPSTICK W REFLEX MICRO PNL UR: ABNORMAL
URN SPEC COLLECT METH UR: ABNORMAL
UROBILINOGEN UR STRIP-ACNC: 0.2 E.U./DL
WBC # BLD AUTO: 6.6 K/UL (ref 4–11)

## 2023-05-19 PROCEDURE — 83880 ASSAY OF NATRIURETIC PEPTIDE: CPT

## 2023-05-19 PROCEDURE — 2580000003 HC RX 258: Performed by: PHYSICIAN ASSISTANT

## 2023-05-19 PROCEDURE — 84484 ASSAY OF TROPONIN QUANT: CPT

## 2023-05-19 PROCEDURE — 81003 URINALYSIS AUTO W/O SCOPE: CPT

## 2023-05-19 PROCEDURE — 99214 OFFICE O/P EST MOD 30 MIN: CPT | Performed by: INTERNAL MEDICINE

## 2023-05-19 PROCEDURE — 3078F DIAST BP <80 MM HG: CPT | Performed by: INTERNAL MEDICINE

## 2023-05-19 PROCEDURE — 85025 COMPLETE CBC W/AUTO DIFF WBC: CPT

## 2023-05-19 PROCEDURE — 99285 EMERGENCY DEPT VISIT HI MDM: CPT

## 2023-05-19 PROCEDURE — 3017F COLORECTAL CA SCREEN DOC REV: CPT | Performed by: INTERNAL MEDICINE

## 2023-05-19 PROCEDURE — G8417 CALC BMI ABV UP PARAM F/U: HCPCS | Performed by: INTERNAL MEDICINE

## 2023-05-19 PROCEDURE — 93010 ELECTROCARDIOGRAM REPORT: CPT | Performed by: INTERNAL MEDICINE

## 2023-05-19 PROCEDURE — 83735 ASSAY OF MAGNESIUM: CPT

## 2023-05-19 PROCEDURE — 6370000000 HC RX 637 (ALT 250 FOR IP): Performed by: PHYSICIAN ASSISTANT

## 2023-05-19 PROCEDURE — G8427 DOCREV CUR MEDS BY ELIG CLIN: HCPCS | Performed by: INTERNAL MEDICINE

## 2023-05-19 PROCEDURE — G8400 PT W/DXA NO RESULTS DOC: HCPCS | Performed by: INTERNAL MEDICINE

## 2023-05-19 PROCEDURE — 93005 ELECTROCARDIOGRAM TRACING: CPT | Performed by: PHYSICIAN ASSISTANT

## 2023-05-19 PROCEDURE — 71045 X-RAY EXAM CHEST 1 VIEW: CPT

## 2023-05-19 PROCEDURE — 96360 HYDRATION IV INFUSION INIT: CPT

## 2023-05-19 PROCEDURE — 80053 COMPREHEN METABOLIC PANEL: CPT

## 2023-05-19 PROCEDURE — 1036F TOBACCO NON-USER: CPT | Performed by: INTERNAL MEDICINE

## 2023-05-19 PROCEDURE — 1111F DSCHRG MED/CURRENT MED MERGE: CPT | Performed by: INTERNAL MEDICINE

## 2023-05-19 PROCEDURE — 3074F SYST BP LT 130 MM HG: CPT | Performed by: INTERNAL MEDICINE

## 2023-05-19 PROCEDURE — 1090F PRES/ABSN URINE INCON ASSESS: CPT | Performed by: INTERNAL MEDICINE

## 2023-05-19 PROCEDURE — 1123F ACP DISCUSS/DSCN MKR DOCD: CPT | Performed by: INTERNAL MEDICINE

## 2023-05-19 RX ORDER — ONDANSETRON 4 MG/1
4 TABLET, ORALLY DISINTEGRATING ORAL ONCE
Status: COMPLETED | OUTPATIENT
Start: 2023-05-19 | End: 2023-05-19

## 2023-05-19 RX ORDER — 0.9 % SODIUM CHLORIDE 0.9 %
1000 INTRAVENOUS SOLUTION INTRAVENOUS ONCE
Status: COMPLETED | OUTPATIENT
Start: 2023-05-19 | End: 2023-05-19

## 2023-05-19 RX ADMIN — SODIUM CHLORIDE 1000 ML: 9 INJECTION, SOLUTION INTRAVENOUS at 13:17

## 2023-05-19 RX ADMIN — ONDANSETRON 4 MG: 4 TABLET, ORALLY DISINTEGRATING ORAL at 12:02

## 2023-05-19 ASSESSMENT — ENCOUNTER SYMPTOMS
COUGH: 0
VOMITING: 0
EYE PAIN: 0
ABDOMINAL PAIN: 0
SHORTNESS OF BREATH: 0
NAUSEA: 1
BACK PAIN: 0
SORE THROAT: 0
RHINORRHEA: 0
DIARRHEA: 0
CONSTIPATION: 0

## 2023-05-19 ASSESSMENT — PAIN - FUNCTIONAL ASSESSMENT: PAIN_FUNCTIONAL_ASSESSMENT: NONE - DENIES PAIN

## 2023-05-19 NOTE — ED PROVIDER NOTES
Right lower leg: No edema. Left lower leg: No edema. Lymphadenopathy:      Cervical: No cervical adenopathy. Skin:     Findings: No rash. Neurological:      General: No focal deficit present. Mental Status: She is alert and oriented to person, place, and time. Psychiatric:         Mood and Affect: Mood normal.         Behavior: Behavior normal.           DIAGNOSTIC RESULTS   LABS:    Labs Reviewed   CBC WITH AUTO DIFFERENTIAL - Abnormal; Notable for the following components:       Result Value    RBC 3.31 (*)     Hemoglobin 10.4 (*)     Hematocrit 32.2 (*)     RDW 17.6 (*)     All other components within normal limits   COMPREHENSIVE METABOLIC PANEL W/ REFLEX TO MG FOR LOW K - Abnormal; Notable for the following components:    Potassium reflex Magnesium 3.3 (*)     Glucose 244 (*)     All other components within normal limits   TROPONIN - Abnormal; Notable for the following components:    Troponin, High Sensitivity 28 (*)     All other components within normal limits   URINALYSIS WITH REFLEX TO CULTURE - Abnormal; Notable for the following components:    Glucose, Ur >=1000 (*)     All other components within normal limits   POCT GLUCOSE - Abnormal; Notable for the following components:    POC Glucose 244 (*)     All other components within normal limits   BRAIN NATRIURETIC PEPTIDE   MAGNESIUM   TROPONIN       When ordered only abnormal lab results are displayed. All other labs were within normal range or not returned as of this dictation. EKG: When ordered, EKG's are interpreted by the Emergency Department Physician in the absence of a cardiologist.  Please see their note for interpretation of EKG.     RADIOLOGY:   Non-plain film images such as CT, Ultrasound and MRI are read by the radiologist. Plain radiographic images are visualized and preliminarily interpreted by the ED Provider with the below findings:        Interpretation per the Radiologist below, if available at the time of this

## 2023-05-23 ENCOUNTER — OFFICE VISIT (OUTPATIENT)
Dept: CARDIOLOGY CLINIC | Age: 73
End: 2023-05-23

## 2023-05-23 VITALS
HEART RATE: 115 BPM | BODY MASS INDEX: 52.49 KG/M2 | SYSTOLIC BLOOD PRESSURE: 110 MMHG | DIASTOLIC BLOOD PRESSURE: 60 MMHG | OXYGEN SATURATION: 90 % | WEIGHT: 278 LBS | HEIGHT: 61 IN

## 2023-05-23 DIAGNOSIS — R00.0 TACHYCARDIA: ICD-10-CM

## 2023-05-23 DIAGNOSIS — E66.01 MORBID OBESITY (HCC): ICD-10-CM

## 2023-05-23 DIAGNOSIS — E08.65 DIABETES MELLITUS DUE TO UNDERLYING CONDITION, UNCONTROLLED, WITH HYPERGLYCEMIA (HCC): ICD-10-CM

## 2023-05-23 DIAGNOSIS — I50.32 CHRONIC DIASTOLIC HEART FAILURE (HCC): Primary | ICD-10-CM

## 2023-05-23 DIAGNOSIS — I10 PRIMARY HYPERTENSION: ICD-10-CM

## 2023-05-23 DIAGNOSIS — G47.33 OSA (OBSTRUCTIVE SLEEP APNEA): ICD-10-CM

## 2023-05-23 NOTE — PROGRESS NOTES
Eyes: No visual changes or diplopia. No scleral icterus. ENT: No Headaches, hearing loss or vertigo. No mouth sores or sore throat. Cardiovascular: Reviewed in HPI  Respiratory: No cough or wheezing, no sputum production. No hematemesis. Gastrointestinal: No abdominal pain, appetite loss, blood in stools. No change in bowel or bladder habits. Genitourinary: No dysuria, trouble voiding, or hematuria. Musculoskeletal:  No gait disturbance, weakness or joint complaints. Integumentary: No rash or pruritis. Neurological: No headache, diplopia, change in muscle strength, numbness or tingling. No change in gait, balance, coordination, mood, affect, memory, mentation, behavior. Psychiatric: No anxiety, no depression. Endocrine: No malaise, fatigue or temperature intolerance. No excessive thirst, fluid intake, or urination. No tremor. Hematologic/Lymphatic: No abnormal bruising or bleeding, blood clots or swollen lymph nodes. Allergic/Immunologic: No nasal congestion or hives. Physical Examination:    Vitals:    05/23/23 1407 05/23/23 1415   BP: 110/60    Site: Right Upper Arm    Position: Sitting    Cuff Size: Medium Adult    Pulse: (!) 115 (!) 115   SpO2: 90%    Weight: 278 lb (126.1 kg)    Height: 5' 1\" (1.549 m)         Constitutional and General Appearance: Warm and dry, no apparent distress, normal coloration  HEENT:  Normocephalic, atraumatic  Respiratory:  Normal excursion and expansion without use of accessory muscles  Resp Auscultation: Normal breath sounds without dullness  Cardiovascular: The apical impulses not displaced  Heart tones are crisp and normal  JVP normal cm H2O  Sinus tachycardia  Peripheral pulses are symmetrical and full  There is no clubbing, cyanosis of the extremities.   no edema  Pedal Pulses: 2+ and equal   Abdomen: morbid obesity  No masses or tenderness  Liver/Spleen: No Abnormalities Noted  Neurological/Psychiatric:  Alert and oriented in all spheres  Moves all extremities

## 2023-05-23 NOTE — PATIENT INSTRUCTIONS
EKG today  Continue all current medications  Referral to Dr Christine Calderon for diabetes management  You really need to work on diabetes, weight loss and sleep evaluation  RTO in august

## 2023-05-24 ENCOUNTER — TELEPHONE (OUTPATIENT)
Dept: CARDIOLOGY CLINIC | Age: 73
End: 2023-05-24

## 2023-05-24 DIAGNOSIS — R94.31 ABNORMAL EKG: Primary | ICD-10-CM

## 2023-05-24 DIAGNOSIS — R06.02 SOB (SHORTNESS OF BREATH): ICD-10-CM

## 2023-05-24 NOTE — TELEPHONE ENCOUNTER
Let her know that I spoke with Dr Brooks Retana about her EKG and we would like for her to have a stress test (no exercising)  I have placed an order, please help her schedule this  thanks

## 2023-05-26 ENCOUNTER — APPOINTMENT (OUTPATIENT)
Dept: CT IMAGING | Age: 73
End: 2023-05-26
Payer: MEDICARE

## 2023-05-26 ENCOUNTER — APPOINTMENT (OUTPATIENT)
Dept: GENERAL RADIOLOGY | Age: 73
End: 2023-05-26
Payer: MEDICARE

## 2023-05-26 ENCOUNTER — HOSPITAL ENCOUNTER (OUTPATIENT)
Age: 73
Setting detail: OBSERVATION
Discharge: HOME | End: 2023-05-31
Attending: EMERGENCY MEDICINE | Admitting: INTERNAL MEDICINE
Payer: MEDICARE

## 2023-05-26 DIAGNOSIS — R77.8 ELEVATED TROPONIN: ICD-10-CM

## 2023-05-26 DIAGNOSIS — R55 NEAR SYNCOPE: Primary | ICD-10-CM

## 2023-05-26 DIAGNOSIS — E87.6 HYPOKALEMIA: ICD-10-CM

## 2023-05-26 LAB
ALBUMIN SERPL-MCNC: 4.3 G/DL (ref 3.4–5)
ALBUMIN/GLOB SERPL: 1.5 {RATIO} (ref 1.1–2.2)
ALP SERPL-CCNC: 82 U/L (ref 40–129)
ALT SERPL-CCNC: 17 U/L (ref 10–40)
ANION GAP SERPL CALCULATED.3IONS-SCNC: 14 MMOL/L (ref 3–16)
AST SERPL-CCNC: 26 U/L (ref 15–37)
BASOPHILS # BLD: 0 K/UL (ref 0–0.2)
BASOPHILS NFR BLD: 0.8 %
BILIRUB SERPL-MCNC: 0.5 MG/DL (ref 0–1)
BILIRUB UR QL STRIP.AUTO: NEGATIVE
BUN SERPL-MCNC: 10 MG/DL (ref 7–20)
CALCIUM SERPL-MCNC: 9.7 MG/DL (ref 8.3–10.6)
CHLORIDE SERPL-SCNC: 96 MMOL/L (ref 99–110)
CLARITY UR: CLEAR
CO2 SERPL-SCNC: 30 MMOL/L (ref 21–32)
COLOR UR: YELLOW
CREAT SERPL-MCNC: 0.7 MG/DL (ref 0.6–1.2)
D DIMER: 1.27 UG/ML FEU (ref 0–0.6)
DEPRECATED RDW RBC AUTO: 16.1 % (ref 12.4–15.4)
EKG ATRIAL RATE: 102 BPM
EKG DIAGNOSIS: NORMAL
EKG P AXIS: 59 DEGREES
EKG P-R INTERVAL: 138 MS
EKG Q-T INTERVAL: 306 MS
EKG QRS DURATION: 78 MS
EKG QTC CALCULATION (BAZETT): 398 MS
EKG R AXIS: -6 DEGREES
EKG T AXIS: 140 DEGREES
EKG VENTRICULAR RATE: 102 BPM
EOSINOPHIL # BLD: 0.1 K/UL (ref 0–0.6)
EOSINOPHIL NFR BLD: 3.4 %
GFR SERPLBLD CREATININE-BSD FMLA CKD-EPI: >60 ML/MIN/{1.73_M2}
GLUCOSE BLD-MCNC: 248 MG/DL (ref 70–99)
GLUCOSE SERPL-MCNC: 283 MG/DL (ref 70–99)
GLUCOSE UR STRIP.AUTO-MCNC: >=1000 MG/DL
HCT VFR BLD AUTO: 33.2 % (ref 36–48)
HGB BLD-MCNC: 10.6 G/DL (ref 12–16)
HGB UR QL STRIP.AUTO: NEGATIVE
KETONES UR STRIP.AUTO-MCNC: NEGATIVE MG/DL
LEUKOCYTE ESTERASE UR QL STRIP.AUTO: NEGATIVE
LYMPHOCYTES # BLD: 0.7 K/UL (ref 1–5.1)
LYMPHOCYTES NFR BLD: 18.9 %
MAGNESIUM SERPL-MCNC: 1.8 MG/DL (ref 1.8–2.4)
MCH RBC QN AUTO: 30.9 PG (ref 26–34)
MCHC RBC AUTO-ENTMCNC: 31.9 G/DL (ref 31–36)
MCV RBC AUTO: 96.8 FL (ref 80–100)
MONOCYTES # BLD: 0.2 K/UL (ref 0–1.3)
MONOCYTES NFR BLD: 5.2 %
NEUTROPHILS # BLD: 2.7 K/UL (ref 1.7–7.7)
NEUTROPHILS NFR BLD: 71.7 %
NITRITE UR QL STRIP.AUTO: NEGATIVE
NT-PROBNP SERPL-MCNC: 50 PG/ML (ref 0–124)
PERFORMED ON: ABNORMAL
PH UR STRIP.AUTO: 6 [PH] (ref 5–8)
PLATELET # BLD AUTO: 136 K/UL (ref 135–450)
PMV BLD AUTO: 8.8 FL (ref 5–10.5)
POTASSIUM SERPL-SCNC: 3.2 MMOL/L (ref 3.5–5.1)
PROT SERPL-MCNC: 7.1 G/DL (ref 6.4–8.2)
PROT UR STRIP.AUTO-MCNC: NEGATIVE MG/DL
RBC # BLD AUTO: 3.42 M/UL (ref 4–5.2)
SODIUM SERPL-SCNC: 140 MMOL/L (ref 136–145)
SP GR UR STRIP.AUTO: 1.01 (ref 1–1.03)
TROPONIN, HIGH SENSITIVITY: 17 NG/L (ref 0–14)
TROPONIN, HIGH SENSITIVITY: 23 NG/L (ref 0–14)
UA COMPLETE W REFLEX CULTURE PNL UR: ABNORMAL
UA DIPSTICK W REFLEX MICRO PNL UR: ABNORMAL
URN SPEC COLLECT METH UR: ABNORMAL
UROBILINOGEN UR STRIP-ACNC: 0.2 E.U./DL
WBC # BLD AUTO: 3.8 K/UL (ref 4–11)

## 2023-05-26 PROCEDURE — 71260 CT THORAX DX C+: CPT

## 2023-05-26 PROCEDURE — 80053 COMPREHEN METABOLIC PANEL: CPT

## 2023-05-26 PROCEDURE — 2580000003 HC RX 258: Performed by: PHYSICIAN ASSISTANT

## 2023-05-26 PROCEDURE — 84484 ASSAY OF TROPONIN QUANT: CPT

## 2023-05-26 PROCEDURE — 6360000002 HC RX W HCPCS: Performed by: INTERNAL MEDICINE

## 2023-05-26 PROCEDURE — 71045 X-RAY EXAM CHEST 1 VIEW: CPT

## 2023-05-26 PROCEDURE — 99285 EMERGENCY DEPT VISIT HI MDM: CPT

## 2023-05-26 PROCEDURE — 36415 COLL VENOUS BLD VENIPUNCTURE: CPT

## 2023-05-26 PROCEDURE — 2580000003 HC RX 258: Performed by: INTERNAL MEDICINE

## 2023-05-26 PROCEDURE — 83735 ASSAY OF MAGNESIUM: CPT

## 2023-05-26 PROCEDURE — 93005 ELECTROCARDIOGRAM TRACING: CPT | Performed by: PHYSICIAN ASSISTANT

## 2023-05-26 PROCEDURE — 83880 ASSAY OF NATRIURETIC PEPTIDE: CPT

## 2023-05-26 PROCEDURE — 85025 COMPLETE CBC W/AUTO DIFF WBC: CPT

## 2023-05-26 PROCEDURE — 85379 FIBRIN DEGRADATION QUANT: CPT

## 2023-05-26 PROCEDURE — 6370000000 HC RX 637 (ALT 250 FOR IP): Performed by: PHYSICIAN ASSISTANT

## 2023-05-26 PROCEDURE — G0378 HOSPITAL OBSERVATION PER HR: HCPCS

## 2023-05-26 PROCEDURE — 81003 URINALYSIS AUTO W/O SCOPE: CPT

## 2023-05-26 PROCEDURE — 6370000000 HC RX 637 (ALT 250 FOR IP): Performed by: INTERNAL MEDICINE

## 2023-05-26 PROCEDURE — 6360000004 HC RX CONTRAST MEDICATION: Performed by: PHYSICIAN ASSISTANT

## 2023-05-26 PROCEDURE — 93010 ELECTROCARDIOGRAM REPORT: CPT | Performed by: INTERNAL MEDICINE

## 2023-05-26 RX ORDER — ONDANSETRON 2 MG/ML
4 INJECTION INTRAMUSCULAR; INTRAVENOUS EVERY 6 HOURS PRN
Status: DISCONTINUED | OUTPATIENT
Start: 2023-05-26 | End: 2023-05-31 | Stop reason: HOSPADM

## 2023-05-26 RX ORDER — QUETIAPINE FUMARATE 100 MG/1
200 TABLET, FILM COATED ORAL NIGHTLY
Status: DISCONTINUED | OUTPATIENT
Start: 2023-05-26 | End: 2023-05-31 | Stop reason: HOSPADM

## 2023-05-26 RX ORDER — DEXTROSE MONOHYDRATE 100 MG/ML
INJECTION, SOLUTION INTRAVENOUS CONTINUOUS PRN
Status: DISCONTINUED | OUTPATIENT
Start: 2023-05-26 | End: 2023-05-31 | Stop reason: HOSPADM

## 2023-05-26 RX ORDER — 0.9 % SODIUM CHLORIDE 0.9 %
500 INTRAVENOUS SOLUTION INTRAVENOUS ONCE
Status: COMPLETED | OUTPATIENT
Start: 2023-05-26 | End: 2023-05-26

## 2023-05-26 RX ORDER — ONDANSETRON 4 MG/1
4 TABLET, ORALLY DISINTEGRATING ORAL EVERY 8 HOURS PRN
Status: DISCONTINUED | OUTPATIENT
Start: 2023-05-26 | End: 2023-05-31 | Stop reason: HOSPADM

## 2023-05-26 RX ORDER — ACETAMINOPHEN 650 MG/1
650 SUPPOSITORY RECTAL EVERY 6 HOURS PRN
Status: DISCONTINUED | OUTPATIENT
Start: 2023-05-26 | End: 2023-05-31 | Stop reason: HOSPADM

## 2023-05-26 RX ORDER — SODIUM CHLORIDE 0.9 % (FLUSH) 0.9 %
5-40 SYRINGE (ML) INJECTION PRN
Status: DISCONTINUED | OUTPATIENT
Start: 2023-05-26 | End: 2023-05-31 | Stop reason: HOSPADM

## 2023-05-26 RX ORDER — 0.9 % SODIUM CHLORIDE 0.9 %
1000 INTRAVENOUS SOLUTION INTRAVENOUS ONCE
Status: DISCONTINUED | OUTPATIENT
Start: 2023-05-26 | End: 2023-05-26

## 2023-05-26 RX ORDER — ENOXAPARIN SODIUM 100 MG/ML
40 INJECTION SUBCUTANEOUS DAILY
Status: DISCONTINUED | OUTPATIENT
Start: 2023-05-26 | End: 2023-05-31 | Stop reason: HOSPADM

## 2023-05-26 RX ORDER — ACETAMINOPHEN 325 MG/1
650 TABLET ORAL EVERY 6 HOURS PRN
Status: DISCONTINUED | OUTPATIENT
Start: 2023-05-26 | End: 2023-05-31 | Stop reason: HOSPADM

## 2023-05-26 RX ORDER — FUROSEMIDE 40 MG/1
60 TABLET ORAL DAILY
Status: DISCONTINUED | OUTPATIENT
Start: 2023-05-27 | End: 2023-05-31 | Stop reason: HOSPADM

## 2023-05-26 RX ORDER — FERROUS SULFATE 325(65) MG
325 TABLET ORAL
Status: DISCONTINUED | OUTPATIENT
Start: 2023-05-27 | End: 2023-05-31 | Stop reason: HOSPADM

## 2023-05-26 RX ORDER — ARIPIPRAZOLE 5 MG/1
5 TABLET ORAL DAILY
Status: DISCONTINUED | OUTPATIENT
Start: 2023-05-27 | End: 2023-05-31 | Stop reason: HOSPADM

## 2023-05-26 RX ORDER — SODIUM CHLORIDE 0.9 % (FLUSH) 0.9 %
5-40 SYRINGE (ML) INJECTION EVERY 12 HOURS SCHEDULED
Status: DISCONTINUED | OUTPATIENT
Start: 2023-05-26 | End: 2023-05-31 | Stop reason: HOSPADM

## 2023-05-26 RX ORDER — SODIUM CHLORIDE 9 MG/ML
INJECTION, SOLUTION INTRAVENOUS CONTINUOUS
Status: DISCONTINUED | OUTPATIENT
Start: 2023-05-26 | End: 2023-05-27

## 2023-05-26 RX ORDER — POTASSIUM CHLORIDE 20 MEQ/1
40 TABLET, EXTENDED RELEASE ORAL PRN
Status: DISCONTINUED | OUTPATIENT
Start: 2023-05-26 | End: 2023-05-31 | Stop reason: HOSPADM

## 2023-05-26 RX ORDER — INSULIN GLARGINE 100 [IU]/ML
40 INJECTION, SOLUTION SUBCUTANEOUS 2 TIMES DAILY
Status: DISCONTINUED | OUTPATIENT
Start: 2023-05-26 | End: 2023-05-31 | Stop reason: HOSPADM

## 2023-05-26 RX ORDER — MAGNESIUM HYDROXIDE/ALUMINUM HYDROXICE/SIMETHICONE 120; 1200; 1200 MG/30ML; MG/30ML; MG/30ML
30 SUSPENSION ORAL EVERY 6 HOURS PRN
Status: DISCONTINUED | OUTPATIENT
Start: 2023-05-26 | End: 2023-05-31 | Stop reason: HOSPADM

## 2023-05-26 RX ORDER — ASPIRIN 81 MG/1
81 TABLET, CHEWABLE ORAL DAILY
Status: DISCONTINUED | OUTPATIENT
Start: 2023-05-27 | End: 2023-05-31 | Stop reason: HOSPADM

## 2023-05-26 RX ORDER — SODIUM CHLORIDE 9 MG/ML
INJECTION, SOLUTION INTRAVENOUS PRN
Status: DISCONTINUED | OUTPATIENT
Start: 2023-05-26 | End: 2023-05-31 | Stop reason: HOSPADM

## 2023-05-26 RX ORDER — LANOLIN ALCOHOL/MO/W.PET/CERES
500 CREAM (GRAM) TOPICAL NIGHTLY
Status: DISCONTINUED | OUTPATIENT
Start: 2023-05-26 | End: 2023-05-26 | Stop reason: SDUPTHER

## 2023-05-26 RX ORDER — NIACIN 500 MG/1
500 TABLET, EXTENDED RELEASE ORAL NIGHTLY
Status: DISCONTINUED | OUTPATIENT
Start: 2023-05-26 | End: 2023-05-31 | Stop reason: HOSPADM

## 2023-05-26 RX ORDER — POTASSIUM CHLORIDE 7.45 MG/ML
10 INJECTION INTRAVENOUS PRN
Status: DISCONTINUED | OUTPATIENT
Start: 2023-05-26 | End: 2023-05-31 | Stop reason: HOSPADM

## 2023-05-26 RX ORDER — QUETIAPINE FUMARATE 100 MG/1
100 TABLET, FILM COATED ORAL DAILY
Status: DISCONTINUED | OUTPATIENT
Start: 2023-05-27 | End: 2023-05-31 | Stop reason: HOSPADM

## 2023-05-26 RX ORDER — DOCUSATE SODIUM 100 MG/1
100 CAPSULE, LIQUID FILLED ORAL DAILY PRN
COMMUNITY

## 2023-05-26 RX ORDER — MONTELUKAST SODIUM 10 MG/1
10 TABLET ORAL NIGHTLY
Status: DISCONTINUED | OUTPATIENT
Start: 2023-05-26 | End: 2023-05-31 | Stop reason: HOSPADM

## 2023-05-26 RX ORDER — ATORVASTATIN CALCIUM 10 MG/1
10 TABLET, FILM COATED ORAL NIGHTLY
Status: DISCONTINUED | OUTPATIENT
Start: 2023-05-26 | End: 2023-05-31 | Stop reason: HOSPADM

## 2023-05-26 RX ORDER — ALBUTEROL SULFATE 90 UG/1
2 AEROSOL, METERED RESPIRATORY (INHALATION) EVERY 6 HOURS PRN
Status: DISCONTINUED | OUTPATIENT
Start: 2023-05-26 | End: 2023-05-31 | Stop reason: HOSPADM

## 2023-05-26 RX ORDER — BISACODYL 5 MG/1
5 TABLET, DELAYED RELEASE ORAL DAILY PRN
Status: DISCONTINUED | OUTPATIENT
Start: 2023-05-26 | End: 2023-05-31 | Stop reason: HOSPADM

## 2023-05-26 RX ORDER — LANOLIN ALCOHOL/MO/W.PET/CERES
1000 CREAM (GRAM) TOPICAL DAILY
Status: DISCONTINUED | OUTPATIENT
Start: 2023-05-27 | End: 2023-05-31 | Stop reason: HOSPADM

## 2023-05-26 RX ADMIN — QUETIAPINE FUMARATE 200 MG: 100 TABLET ORAL at 20:11

## 2023-05-26 RX ADMIN — SODIUM CHLORIDE 500 ML: 9 INJECTION, SOLUTION INTRAVENOUS at 16:21

## 2023-05-26 RX ADMIN — MONTELUKAST SODIUM 10 MG: 10 TABLET, FILM COATED ORAL at 20:11

## 2023-05-26 RX ADMIN — SODIUM CHLORIDE, PRESERVATIVE FREE 10 ML: 5 INJECTION INTRAVENOUS at 20:11

## 2023-05-26 RX ADMIN — ATORVASTATIN CALCIUM 10 MG: 10 TABLET, FILM COATED ORAL at 20:11

## 2023-05-26 RX ADMIN — IOPAMIDOL 75 ML: 755 INJECTION, SOLUTION INTRAVENOUS at 15:54

## 2023-05-26 RX ADMIN — SODIUM CHLORIDE: 9 INJECTION, SOLUTION INTRAVENOUS at 20:14

## 2023-05-26 RX ADMIN — POTASSIUM BICARBONATE 40 MEQ: 782 TABLET, EFFERVESCENT ORAL at 16:43

## 2023-05-26 RX ADMIN — INSULIN GLARGINE 40 UNITS: 100 INJECTION, SOLUTION SUBCUTANEOUS at 20:12

## 2023-05-26 RX ADMIN — ENOXAPARIN SODIUM 40 MG: 100 INJECTION SUBCUTANEOUS at 20:21

## 2023-05-26 RX ADMIN — NIACIN 500 MG: 500 TABLET, EXTENDED RELEASE ORAL at 20:11

## 2023-05-26 ASSESSMENT — ENCOUNTER SYMPTOMS
VOMITING: 0
ABDOMINAL PAIN: 0
STRIDOR: 0
NAUSEA: 0
BACK PAIN: 0
SHORTNESS OF BREATH: 0
CONSTIPATION: 0
DIARRHEA: 0
CHEST TIGHTNESS: 0
WHEEZING: 0
RESPIRATORY NEGATIVE: 1
PHOTOPHOBIA: 0
COLOR CHANGE: 0
COUGH: 0

## 2023-05-26 ASSESSMENT — PAIN SCALES - GENERAL: PAINLEVEL_OUTOF10: 0

## 2023-05-26 ASSESSMENT — PAIN - FUNCTIONAL ASSESSMENT: PAIN_FUNCTIONAL_ASSESSMENT: NONE - DENIES PAIN

## 2023-05-27 LAB
ALBUMIN SERPL-MCNC: 4.3 G/DL (ref 3.4–5)
ALBUMIN/GLOB SERPL: 1.4 {RATIO} (ref 1.1–2.2)
ALP SERPL-CCNC: 95 U/L (ref 40–129)
ALT SERPL-CCNC: 17 U/L (ref 10–40)
ANION GAP SERPL CALCULATED.3IONS-SCNC: 12 MMOL/L (ref 3–16)
AST SERPL-CCNC: 21 U/L (ref 15–37)
BASOPHILS # BLD: 0 K/UL (ref 0–0.2)
BASOPHILS NFR BLD: 0.7 %
BILIRUB SERPL-MCNC: 0.5 MG/DL (ref 0–1)
BUN SERPL-MCNC: 12 MG/DL (ref 7–20)
CALCIUM SERPL-MCNC: 9.3 MG/DL (ref 8.3–10.6)
CHLORIDE SERPL-SCNC: 99 MMOL/L (ref 99–110)
CO2 SERPL-SCNC: 31 MMOL/L (ref 21–32)
CREAT SERPL-MCNC: 0.7 MG/DL (ref 0.6–1.2)
DEPRECATED RDW RBC AUTO: 16.5 % (ref 12.4–15.4)
EOSINOPHIL # BLD: 0.3 K/UL (ref 0–0.6)
EOSINOPHIL NFR BLD: 4.7 %
GFR SERPLBLD CREATININE-BSD FMLA CKD-EPI: >60 ML/MIN/{1.73_M2}
GLUCOSE BLD-MCNC: 189 MG/DL (ref 70–99)
GLUCOSE BLD-MCNC: 189 MG/DL (ref 70–99)
GLUCOSE BLD-MCNC: 238 MG/DL (ref 70–99)
GLUCOSE BLD-MCNC: 253 MG/DL (ref 70–99)
GLUCOSE SERPL-MCNC: 195 MG/DL (ref 70–99)
HCT VFR BLD AUTO: 33.5 % (ref 36–48)
HGB BLD-MCNC: 10.8 G/DL (ref 12–16)
LYMPHOCYTES # BLD: 1.6 K/UL (ref 1–5.1)
LYMPHOCYTES NFR BLD: 29.3 %
MAGNESIUM SERPL-MCNC: 2 MG/DL (ref 1.8–2.4)
MCH RBC QN AUTO: 31.5 PG (ref 26–34)
MCHC RBC AUTO-ENTMCNC: 32.3 G/DL (ref 31–36)
MCV RBC AUTO: 97.4 FL (ref 80–100)
MONOCYTES # BLD: 0.4 K/UL (ref 0–1.3)
MONOCYTES NFR BLD: 7.2 %
NEUTROPHILS # BLD: 3.1 K/UL (ref 1.7–7.7)
NEUTROPHILS NFR BLD: 58.1 %
PERFORMED ON: ABNORMAL
PLATELET # BLD AUTO: 155 K/UL (ref 135–450)
PMV BLD AUTO: 9 FL (ref 5–10.5)
POTASSIUM SERPL-SCNC: 3.2 MMOL/L (ref 3.5–5.1)
PROT SERPL-MCNC: 7.4 G/DL (ref 6.4–8.2)
RBC # BLD AUTO: 3.44 M/UL (ref 4–5.2)
SODIUM SERPL-SCNC: 142 MMOL/L (ref 136–145)
WBC # BLD AUTO: 5.4 K/UL (ref 4–11)

## 2023-05-27 PROCEDURE — 85025 COMPLETE CBC W/AUTO DIFF WBC: CPT

## 2023-05-27 PROCEDURE — 2700000000 HC OXYGEN THERAPY PER DAY

## 2023-05-27 PROCEDURE — 80053 COMPREHEN METABOLIC PANEL: CPT

## 2023-05-27 PROCEDURE — 6370000000 HC RX 637 (ALT 250 FOR IP): Performed by: INTERNAL MEDICINE

## 2023-05-27 PROCEDURE — 36415 COLL VENOUS BLD VENIPUNCTURE: CPT

## 2023-05-27 PROCEDURE — G0378 HOSPITAL OBSERVATION PER HR: HCPCS

## 2023-05-27 PROCEDURE — 6360000002 HC RX W HCPCS: Performed by: INTERNAL MEDICINE

## 2023-05-27 PROCEDURE — 2580000003 HC RX 258: Performed by: INTERNAL MEDICINE

## 2023-05-27 PROCEDURE — 99223 1ST HOSP IP/OBS HIGH 75: CPT | Performed by: INTERNAL MEDICINE

## 2023-05-27 PROCEDURE — 94761 N-INVAS EAR/PLS OXIMETRY MLT: CPT

## 2023-05-27 PROCEDURE — 94640 AIRWAY INHALATION TREATMENT: CPT

## 2023-05-27 PROCEDURE — 94150 VITAL CAPACITY TEST: CPT

## 2023-05-27 PROCEDURE — 83735 ASSAY OF MAGNESIUM: CPT

## 2023-05-27 PROCEDURE — 6370000000 HC RX 637 (ALT 250 FOR IP): Performed by: FAMILY MEDICINE

## 2023-05-27 RX ORDER — INSULIN LISPRO 100 [IU]/ML
5 INJECTION, SOLUTION INTRAVENOUS; SUBCUTANEOUS
Status: DISCONTINUED | OUTPATIENT
Start: 2023-05-27 | End: 2023-05-28

## 2023-05-27 RX ORDER — LANOLIN ALCOHOL/MO/W.PET/CERES
3 CREAM (GRAM) TOPICAL NIGHTLY PRN
Status: DISCONTINUED | OUTPATIENT
Start: 2023-05-27 | End: 2023-05-31 | Stop reason: HOSPADM

## 2023-05-27 RX ADMIN — NIACIN 500 MG: 500 TABLET, EXTENDED RELEASE ORAL at 19:52

## 2023-05-27 RX ADMIN — FERROUS SULFATE TAB 325 MG (65 MG ELEMENTAL FE) 325 MG: 325 (65 FE) TAB at 09:21

## 2023-05-27 RX ADMIN — ATORVASTATIN CALCIUM 10 MG: 10 TABLET, FILM COATED ORAL at 19:45

## 2023-05-27 RX ADMIN — ARIPIPRAZOLE 5 MG: 5 TABLET ORAL at 09:16

## 2023-05-27 RX ADMIN — TIOTROPIUM BROMIDE INHALATION SPRAY 2 PUFF: 3.12 SPRAY, METERED RESPIRATORY (INHALATION) at 08:08

## 2023-05-27 RX ADMIN — MONTELUKAST SODIUM 10 MG: 10 TABLET, FILM COATED ORAL at 19:45

## 2023-05-27 RX ADMIN — SODIUM CHLORIDE, PRESERVATIVE FREE 10 ML: 5 INJECTION INTRAVENOUS at 19:46

## 2023-05-27 RX ADMIN — QUETIAPINE FUMARATE 200 MG: 100 TABLET ORAL at 19:45

## 2023-05-27 RX ADMIN — QUETIAPINE FUMARATE 100 MG: 100 TABLET ORAL at 09:17

## 2023-05-27 RX ADMIN — MELATONIN TAB 3 MG 3 MG: 3 TAB at 21:41

## 2023-05-27 RX ADMIN — INSULIN LISPRO 5 UNITS: 100 INJECTION, SOLUTION INTRAVENOUS; SUBCUTANEOUS at 17:43

## 2023-05-27 RX ADMIN — INSULIN GLARGINE 40 UNITS: 100 INJECTION, SOLUTION SUBCUTANEOUS at 09:18

## 2023-05-27 RX ADMIN — POTASSIUM CHLORIDE 40 MEQ: 1500 TABLET, EXTENDED RELEASE ORAL at 09:29

## 2023-05-27 RX ADMIN — SODIUM CHLORIDE, PRESERVATIVE FREE 10 ML: 5 INJECTION INTRAVENOUS at 09:22

## 2023-05-27 RX ADMIN — EMPAGLIFLOZIN 10 MG: 10 TABLET, FILM COATED ORAL at 09:17

## 2023-05-27 RX ADMIN — FUROSEMIDE 60 MG: 40 TABLET ORAL at 09:16

## 2023-05-27 RX ADMIN — CYANOCOBALAMIN TAB 1000 MCG 1000 MCG: 1000 TAB at 09:17

## 2023-05-27 RX ADMIN — ASPIRIN 81 MG: 81 TABLET, CHEWABLE ORAL at 09:16

## 2023-05-27 RX ADMIN — ENOXAPARIN SODIUM 40 MG: 100 INJECTION SUBCUTANEOUS at 09:15

## 2023-05-27 RX ADMIN — ACETAMINOPHEN 650 MG: 325 TABLET ORAL at 16:35

## 2023-05-27 RX ADMIN — INSULIN LISPRO 5 UNITS: 100 INJECTION, SOLUTION INTRAVENOUS; SUBCUTANEOUS at 12:19

## 2023-05-27 RX ADMIN — INSULIN GLARGINE 40 UNITS: 100 INJECTION, SOLUTION SUBCUTANEOUS at 19:45

## 2023-05-27 ASSESSMENT — PAIN DESCRIPTION - LOCATION: LOCATION: HEAD

## 2023-05-27 ASSESSMENT — PAIN DESCRIPTION - DESCRIPTORS: DESCRIPTORS: ACHING

## 2023-05-27 ASSESSMENT — PAIN SCALES - GENERAL
PAINLEVEL_OUTOF10: 0
PAINLEVEL_OUTOF10: 6

## 2023-05-28 LAB
ALBUMIN SERPL-MCNC: 3.8 G/DL (ref 3.4–5)
ALBUMIN/GLOB SERPL: 1.2 {RATIO} (ref 1.1–2.2)
ALP SERPL-CCNC: 94 U/L (ref 40–129)
ALT SERPL-CCNC: 16 U/L (ref 10–40)
ANION GAP SERPL CALCULATED.3IONS-SCNC: 9 MMOL/L (ref 3–16)
AST SERPL-CCNC: 20 U/L (ref 15–37)
BASOPHILS # BLD: 0 K/UL (ref 0–0.2)
BASOPHILS NFR BLD: 0.8 %
BILIRUB SERPL-MCNC: 0.5 MG/DL (ref 0–1)
BUN SERPL-MCNC: 12 MG/DL (ref 7–20)
CALCIUM SERPL-MCNC: 9.1 MG/DL (ref 8.3–10.6)
CHLORIDE SERPL-SCNC: 98 MMOL/L (ref 99–110)
CO2 SERPL-SCNC: 31 MMOL/L (ref 21–32)
CREAT SERPL-MCNC: 0.8 MG/DL (ref 0.6–1.2)
DEPRECATED RDW RBC AUTO: 16.6 % (ref 12.4–15.4)
EOSINOPHIL # BLD: 0.2 K/UL (ref 0–0.6)
EOSINOPHIL NFR BLD: 5.2 %
GFR SERPLBLD CREATININE-BSD FMLA CKD-EPI: >60 ML/MIN/{1.73_M2}
GLUCOSE BLD-MCNC: 194 MG/DL (ref 70–99)
GLUCOSE BLD-MCNC: 198 MG/DL (ref 70–99)
GLUCOSE BLD-MCNC: 201 MG/DL (ref 70–99)
GLUCOSE BLD-MCNC: 238 MG/DL (ref 70–99)
GLUCOSE SERPL-MCNC: 212 MG/DL (ref 70–99)
HCT VFR BLD AUTO: 31.7 % (ref 36–48)
HGB BLD-MCNC: 10.1 G/DL (ref 12–16)
LYMPHOCYTES # BLD: 1 K/UL (ref 1–5.1)
LYMPHOCYTES NFR BLD: 25.1 %
MAGNESIUM SERPL-MCNC: 2.1 MG/DL (ref 1.8–2.4)
MCH RBC QN AUTO: 31.2 PG (ref 26–34)
MCHC RBC AUTO-ENTMCNC: 31.8 G/DL (ref 31–36)
MCV RBC AUTO: 98.2 FL (ref 80–100)
MONOCYTES # BLD: 0.3 K/UL (ref 0–1.3)
MONOCYTES NFR BLD: 7.1 %
NEUTROPHILS # BLD: 2.4 K/UL (ref 1.7–7.7)
NEUTROPHILS NFR BLD: 61.8 %
PERFORMED ON: ABNORMAL
PLATELET # BLD AUTO: 132 K/UL (ref 135–450)
PMV BLD AUTO: 8.9 FL (ref 5–10.5)
POTASSIUM SERPL-SCNC: 3.3 MMOL/L (ref 3.5–5.1)
PROT SERPL-MCNC: 7 G/DL (ref 6.4–8.2)
RBC # BLD AUTO: 3.23 M/UL (ref 4–5.2)
SODIUM SERPL-SCNC: 138 MMOL/L (ref 136–145)
WBC # BLD AUTO: 3.9 K/UL (ref 4–11)

## 2023-05-28 PROCEDURE — G0378 HOSPITAL OBSERVATION PER HR: HCPCS

## 2023-05-28 PROCEDURE — 99232 SBSQ HOSP IP/OBS MODERATE 35: CPT | Performed by: NURSE PRACTITIONER

## 2023-05-28 PROCEDURE — 94640 AIRWAY INHALATION TREATMENT: CPT

## 2023-05-28 PROCEDURE — 80053 COMPREHEN METABOLIC PANEL: CPT

## 2023-05-28 PROCEDURE — 2700000000 HC OXYGEN THERAPY PER DAY

## 2023-05-28 PROCEDURE — 6360000002 HC RX W HCPCS: Performed by: INTERNAL MEDICINE

## 2023-05-28 PROCEDURE — 36415 COLL VENOUS BLD VENIPUNCTURE: CPT

## 2023-05-28 PROCEDURE — 83735 ASSAY OF MAGNESIUM: CPT

## 2023-05-28 PROCEDURE — 6370000000 HC RX 637 (ALT 250 FOR IP): Performed by: INTERNAL MEDICINE

## 2023-05-28 PROCEDURE — 6370000000 HC RX 637 (ALT 250 FOR IP): Performed by: FAMILY MEDICINE

## 2023-05-28 PROCEDURE — 94760 N-INVAS EAR/PLS OXIMETRY 1: CPT

## 2023-05-28 PROCEDURE — 2580000003 HC RX 258: Performed by: INTERNAL MEDICINE

## 2023-05-28 PROCEDURE — 85025 COMPLETE CBC W/AUTO DIFF WBC: CPT

## 2023-05-28 RX ORDER — INSULIN LISPRO 100 [IU]/ML
0-4 INJECTION, SOLUTION INTRAVENOUS; SUBCUTANEOUS NIGHTLY
Status: DISCONTINUED | OUTPATIENT
Start: 2023-05-28 | End: 2023-05-31 | Stop reason: HOSPADM

## 2023-05-28 RX ORDER — INSULIN LISPRO 100 [IU]/ML
0-8 INJECTION, SOLUTION INTRAVENOUS; SUBCUTANEOUS
Status: DISCONTINUED | OUTPATIENT
Start: 2023-05-28 | End: 2023-05-31 | Stop reason: HOSPADM

## 2023-05-28 RX ORDER — POLYETHYLENE GLYCOL 3350 17 G/17G
17 POWDER, FOR SOLUTION ORAL DAILY PRN
Status: DISCONTINUED | OUTPATIENT
Start: 2023-05-28 | End: 2023-05-31 | Stop reason: HOSPADM

## 2023-05-28 RX ORDER — INSULIN LISPRO 100 [IU]/ML
5 INJECTION, SOLUTION INTRAVENOUS; SUBCUTANEOUS
Status: DISCONTINUED | OUTPATIENT
Start: 2023-05-28 | End: 2023-05-31 | Stop reason: HOSPADM

## 2023-05-28 RX ORDER — INSULIN LISPRO 100 [IU]/ML
8 INJECTION, SOLUTION INTRAVENOUS; SUBCUTANEOUS
Status: DISCONTINUED | OUTPATIENT
Start: 2023-05-28 | End: 2023-05-28

## 2023-05-28 RX ORDER — POTASSIUM CHLORIDE 20 MEQ/1
40 TABLET, EXTENDED RELEASE ORAL 2 TIMES DAILY WITH MEALS
Status: COMPLETED | OUTPATIENT
Start: 2023-05-28 | End: 2023-05-29

## 2023-05-28 RX ADMIN — POTASSIUM CHLORIDE 40 MEQ: 1500 TABLET, EXTENDED RELEASE ORAL at 09:14

## 2023-05-28 RX ADMIN — QUETIAPINE FUMARATE 100 MG: 100 TABLET ORAL at 09:05

## 2023-05-28 RX ADMIN — CYANOCOBALAMIN TAB 1000 MCG 1000 MCG: 1000 TAB at 09:05

## 2023-05-28 RX ADMIN — POTASSIUM CHLORIDE 40 MEQ: 1500 TABLET, EXTENDED RELEASE ORAL at 17:13

## 2023-05-28 RX ADMIN — SODIUM CHLORIDE, PRESERVATIVE FREE 10 ML: 5 INJECTION INTRAVENOUS at 20:01

## 2023-05-28 RX ADMIN — ARIPIPRAZOLE 5 MG: 5 TABLET ORAL at 09:04

## 2023-05-28 RX ADMIN — INSULIN LISPRO 5 UNITS: 100 INJECTION, SOLUTION INTRAVENOUS; SUBCUTANEOUS at 13:22

## 2023-05-28 RX ADMIN — TIOTROPIUM BROMIDE INHALATION SPRAY 2 PUFF: 3.12 SPRAY, METERED RESPIRATORY (INHALATION) at 08:53

## 2023-05-28 RX ADMIN — INSULIN LISPRO 2 UNITS: 100 INJECTION, SOLUTION INTRAVENOUS; SUBCUTANEOUS at 17:17

## 2023-05-28 RX ADMIN — ATORVASTATIN CALCIUM 10 MG: 10 TABLET, FILM COATED ORAL at 20:01

## 2023-05-28 RX ADMIN — BISACODYL 5 MG: 5 TABLET, COATED ORAL at 20:01

## 2023-05-28 RX ADMIN — INSULIN LISPRO 5 UNITS: 100 INJECTION, SOLUTION INTRAVENOUS; SUBCUTANEOUS at 17:18

## 2023-05-28 RX ADMIN — INSULIN GLARGINE 40 UNITS: 100 INJECTION, SOLUTION SUBCUTANEOUS at 20:07

## 2023-05-28 RX ADMIN — FUROSEMIDE 60 MG: 40 TABLET ORAL at 09:05

## 2023-05-28 RX ADMIN — MONTELUKAST SODIUM 10 MG: 10 TABLET, FILM COATED ORAL at 20:01

## 2023-05-28 RX ADMIN — INSULIN GLARGINE 40 UNITS: 100 INJECTION, SOLUTION SUBCUTANEOUS at 09:06

## 2023-05-28 RX ADMIN — MELATONIN TAB 3 MG 3 MG: 3 TAB at 23:02

## 2023-05-28 RX ADMIN — ENOXAPARIN SODIUM 40 MG: 100 INJECTION SUBCUTANEOUS at 09:05

## 2023-05-28 RX ADMIN — NIACIN 500 MG: 500 TABLET, EXTENDED RELEASE ORAL at 20:07

## 2023-05-28 RX ADMIN — ASPIRIN 81 MG: 81 TABLET, CHEWABLE ORAL at 09:05

## 2023-05-28 RX ADMIN — SODIUM CHLORIDE, PRESERVATIVE FREE 10 ML: 5 INJECTION INTRAVENOUS at 09:04

## 2023-05-28 RX ADMIN — QUETIAPINE FUMARATE 200 MG: 100 TABLET ORAL at 20:01

## 2023-05-28 RX ADMIN — FERROUS SULFATE TAB 325 MG (65 MG ELEMENTAL FE) 325 MG: 325 (65 FE) TAB at 09:04

## 2023-05-28 RX ADMIN — POTASSIUM CHLORIDE 40 MEQ: 1500 TABLET, EXTENDED RELEASE ORAL at 10:18

## 2023-05-28 RX ADMIN — EMPAGLIFLOZIN 10 MG: 10 TABLET, FILM COATED ORAL at 09:05

## 2023-05-28 RX ADMIN — INSULIN LISPRO 5 UNITS: 100 INJECTION, SOLUTION INTRAVENOUS; SUBCUTANEOUS at 09:06

## 2023-05-29 LAB
ALBUMIN SERPL-MCNC: 3.8 G/DL (ref 3.4–5)
ALBUMIN/GLOB SERPL: 1.2 {RATIO} (ref 1.1–2.2)
ALP SERPL-CCNC: 97 U/L (ref 40–129)
ALT SERPL-CCNC: 17 U/L (ref 10–40)
ANION GAP SERPL CALCULATED.3IONS-SCNC: 8 MMOL/L (ref 3–16)
AST SERPL-CCNC: 22 U/L (ref 15–37)
BASOPHILS # BLD: 0 K/UL (ref 0–0.2)
BASOPHILS NFR BLD: 0.8 %
BILIRUB SERPL-MCNC: 0.7 MG/DL (ref 0–1)
BUN SERPL-MCNC: 12 MG/DL (ref 7–20)
CALCIUM SERPL-MCNC: 9.3 MG/DL (ref 8.3–10.6)
CHLORIDE SERPL-SCNC: 99 MMOL/L (ref 99–110)
CO2 SERPL-SCNC: 31 MMOL/L (ref 21–32)
CREAT SERPL-MCNC: 0.7 MG/DL (ref 0.6–1.2)
DEPRECATED RDW RBC AUTO: 16.5 % (ref 12.4–15.4)
EOSINOPHIL # BLD: 0.3 K/UL (ref 0–0.6)
EOSINOPHIL NFR BLD: 4.9 %
GFR SERPLBLD CREATININE-BSD FMLA CKD-EPI: >60 ML/MIN/{1.73_M2}
GLUCOSE BLD-MCNC: 179 MG/DL (ref 70–99)
GLUCOSE BLD-MCNC: 202 MG/DL (ref 70–99)
GLUCOSE BLD-MCNC: 209 MG/DL (ref 70–99)
GLUCOSE BLD-MCNC: 210 MG/DL (ref 70–99)
GLUCOSE SERPL-MCNC: 201 MG/DL (ref 70–99)
HCT VFR BLD AUTO: 33.1 % (ref 36–48)
HGB BLD-MCNC: 10.6 G/DL (ref 12–16)
LYMPHOCYTES # BLD: 1.2 K/UL (ref 1–5.1)
LYMPHOCYTES NFR BLD: 22.2 %
MCH RBC QN AUTO: 31.3 PG (ref 26–34)
MCHC RBC AUTO-ENTMCNC: 32 G/DL (ref 31–36)
MCV RBC AUTO: 97.8 FL (ref 80–100)
MONOCYTES # BLD: 0.4 K/UL (ref 0–1.3)
MONOCYTES NFR BLD: 7 %
NEUTROPHILS # BLD: 3.4 K/UL (ref 1.7–7.7)
NEUTROPHILS NFR BLD: 65.1 %
PERFORMED ON: ABNORMAL
PLATELET # BLD AUTO: 141 K/UL (ref 135–450)
PMV BLD AUTO: 8.6 FL (ref 5–10.5)
POTASSIUM SERPL-SCNC: 3.7 MMOL/L (ref 3.5–5.1)
PROT SERPL-MCNC: 7.1 G/DL (ref 6.4–8.2)
RBC # BLD AUTO: 3.38 M/UL (ref 4–5.2)
SODIUM SERPL-SCNC: 138 MMOL/L (ref 136–145)
WBC # BLD AUTO: 5.2 K/UL (ref 4–11)

## 2023-05-29 PROCEDURE — 36415 COLL VENOUS BLD VENIPUNCTURE: CPT

## 2023-05-29 PROCEDURE — 80053 COMPREHEN METABOLIC PANEL: CPT

## 2023-05-29 PROCEDURE — 94761 N-INVAS EAR/PLS OXIMETRY MLT: CPT

## 2023-05-29 PROCEDURE — 85025 COMPLETE CBC W/AUTO DIFF WBC: CPT

## 2023-05-29 PROCEDURE — 2580000003 HC RX 258: Performed by: INTERNAL MEDICINE

## 2023-05-29 PROCEDURE — 2700000000 HC OXYGEN THERAPY PER DAY

## 2023-05-29 PROCEDURE — 6370000000 HC RX 637 (ALT 250 FOR IP): Performed by: INTERNAL MEDICINE

## 2023-05-29 PROCEDURE — 99221 1ST HOSP IP/OBS SF/LOW 40: CPT | Performed by: INTERNAL MEDICINE

## 2023-05-29 PROCEDURE — 6370000000 HC RX 637 (ALT 250 FOR IP): Performed by: FAMILY MEDICINE

## 2023-05-29 PROCEDURE — 6360000002 HC RX W HCPCS: Performed by: INTERNAL MEDICINE

## 2023-05-29 PROCEDURE — 94640 AIRWAY INHALATION TREATMENT: CPT

## 2023-05-29 PROCEDURE — G0378 HOSPITAL OBSERVATION PER HR: HCPCS

## 2023-05-29 RX ADMIN — INSULIN LISPRO 5 UNITS: 100 INJECTION, SOLUTION INTRAVENOUS; SUBCUTANEOUS at 09:34

## 2023-05-29 RX ADMIN — INSULIN LISPRO 5 UNITS: 100 INJECTION, SOLUTION INTRAVENOUS; SUBCUTANEOUS at 12:29

## 2023-05-29 RX ADMIN — INSULIN GLARGINE 40 UNITS: 100 INJECTION, SOLUTION SUBCUTANEOUS at 09:33

## 2023-05-29 RX ADMIN — ARIPIPRAZOLE 5 MG: 5 TABLET ORAL at 09:32

## 2023-05-29 RX ADMIN — CYANOCOBALAMIN TAB 1000 MCG 1000 MCG: 1000 TAB at 09:32

## 2023-05-29 RX ADMIN — EMPAGLIFLOZIN 10 MG: 10 TABLET, FILM COATED ORAL at 09:33

## 2023-05-29 RX ADMIN — POTASSIUM CHLORIDE 40 MEQ: 1500 TABLET, EXTENDED RELEASE ORAL at 17:17

## 2023-05-29 RX ADMIN — SODIUM CHLORIDE, PRESERVATIVE FREE 10 ML: 5 INJECTION INTRAVENOUS at 20:06

## 2023-05-29 RX ADMIN — INSULIN LISPRO 2 UNITS: 100 INJECTION, SOLUTION INTRAVENOUS; SUBCUTANEOUS at 12:29

## 2023-05-29 RX ADMIN — INSULIN LISPRO 2 UNITS: 100 INJECTION, SOLUTION INTRAVENOUS; SUBCUTANEOUS at 17:22

## 2023-05-29 RX ADMIN — ENOXAPARIN SODIUM 40 MG: 100 INJECTION SUBCUTANEOUS at 09:33

## 2023-05-29 RX ADMIN — SODIUM CHLORIDE, PRESERVATIVE FREE 10 ML: 5 INJECTION INTRAVENOUS at 09:33

## 2023-05-29 RX ADMIN — QUETIAPINE FUMARATE 100 MG: 100 TABLET ORAL at 09:32

## 2023-05-29 RX ADMIN — INSULIN LISPRO 5 UNITS: 100 INJECTION, SOLUTION INTRAVENOUS; SUBCUTANEOUS at 17:23

## 2023-05-29 RX ADMIN — ASPIRIN 81 MG: 81 TABLET, CHEWABLE ORAL at 09:33

## 2023-05-29 RX ADMIN — QUETIAPINE FUMARATE 200 MG: 100 TABLET ORAL at 20:06

## 2023-05-29 RX ADMIN — POTASSIUM CHLORIDE 40 MEQ: 1500 TABLET, EXTENDED RELEASE ORAL at 09:32

## 2023-05-29 RX ADMIN — ATORVASTATIN CALCIUM 10 MG: 10 TABLET, FILM COATED ORAL at 20:06

## 2023-05-29 RX ADMIN — INSULIN GLARGINE 40 UNITS: 100 INJECTION, SOLUTION SUBCUTANEOUS at 20:16

## 2023-05-29 RX ADMIN — FERROUS SULFATE TAB 325 MG (65 MG ELEMENTAL FE) 325 MG: 325 (65 FE) TAB at 09:33

## 2023-05-29 RX ADMIN — TIOTROPIUM BROMIDE INHALATION SPRAY 2 PUFF: 3.12 SPRAY, METERED RESPIRATORY (INHALATION) at 08:24

## 2023-05-29 RX ADMIN — NIACIN 500 MG: 500 TABLET, EXTENDED RELEASE ORAL at 20:16

## 2023-05-29 RX ADMIN — FUROSEMIDE 60 MG: 40 TABLET ORAL at 09:32

## 2023-05-29 RX ADMIN — MELATONIN TAB 3 MG 3 MG: 3 TAB at 20:06

## 2023-05-29 RX ADMIN — MONTELUKAST SODIUM 10 MG: 10 TABLET, FILM COATED ORAL at 20:06

## 2023-05-29 ASSESSMENT — PAIN SCALES - GENERAL
PAINLEVEL_OUTOF10: 0

## 2023-05-30 LAB
ALBUMIN SERPL-MCNC: 3.8 G/DL (ref 3.4–5)
ALBUMIN/GLOB SERPL: 1.1 {RATIO} (ref 1.1–2.2)
ALP SERPL-CCNC: 96 U/L (ref 40–129)
ALT SERPL-CCNC: 18 U/L (ref 10–40)
ANION GAP SERPL CALCULATED.3IONS-SCNC: 10 MMOL/L (ref 3–16)
AST SERPL-CCNC: 24 U/L (ref 15–37)
BASOPHILS # BLD: 0 K/UL (ref 0–0.2)
BASOPHILS NFR BLD: 0.8 %
BILIRUB SERPL-MCNC: 0.9 MG/DL (ref 0–1)
BUN SERPL-MCNC: 12 MG/DL (ref 7–20)
CALCIUM SERPL-MCNC: 10 MG/DL (ref 8.3–10.6)
CHLORIDE SERPL-SCNC: 97 MMOL/L (ref 99–110)
CO2 SERPL-SCNC: 27 MMOL/L (ref 21–32)
CREAT SERPL-MCNC: 0.8 MG/DL (ref 0.6–1.2)
DEPRECATED RDW RBC AUTO: 16.6 % (ref 12.4–15.4)
EOSINOPHIL # BLD: 0.3 K/UL (ref 0–0.6)
EOSINOPHIL NFR BLD: 5.1 %
GFR SERPLBLD CREATININE-BSD FMLA CKD-EPI: >60 ML/MIN/{1.73_M2}
GLUCOSE BLD-MCNC: 168 MG/DL (ref 70–99)
GLUCOSE BLD-MCNC: 190 MG/DL (ref 70–99)
GLUCOSE BLD-MCNC: 208 MG/DL (ref 70–99)
GLUCOSE BLD-MCNC: 253 MG/DL (ref 70–99)
GLUCOSE SERPL-MCNC: 182 MG/DL (ref 70–99)
HCT VFR BLD AUTO: 36.8 % (ref 36–48)
HGB BLD-MCNC: 11.5 G/DL (ref 12–16)
LYMPHOCYTES # BLD: 1.3 K/UL (ref 1–5.1)
LYMPHOCYTES NFR BLD: 22.1 %
MCH RBC QN AUTO: 30.9 PG (ref 26–34)
MCHC RBC AUTO-ENTMCNC: 31.3 G/DL (ref 31–36)
MCV RBC AUTO: 98.8 FL (ref 80–100)
MONOCYTES # BLD: 0.5 K/UL (ref 0–1.3)
MONOCYTES NFR BLD: 8.8 %
NEUTROPHILS # BLD: 3.8 K/UL (ref 1.7–7.7)
NEUTROPHILS NFR BLD: 63.2 %
PERFORMED ON: ABNORMAL
PLATELET # BLD AUTO: 139 K/UL (ref 135–450)
PMV BLD AUTO: 9.1 FL (ref 5–10.5)
POTASSIUM SERPL-SCNC: 4.1 MMOL/L (ref 3.5–5.1)
PROT SERPL-MCNC: 7.4 G/DL (ref 6.4–8.2)
RBC # BLD AUTO: 3.73 M/UL (ref 4–5.2)
SODIUM SERPL-SCNC: 134 MMOL/L (ref 136–145)
WBC # BLD AUTO: 6 K/UL (ref 4–11)

## 2023-05-30 PROCEDURE — G0378 HOSPITAL OBSERVATION PER HR: HCPCS

## 2023-05-30 PROCEDURE — 83036 HEMOGLOBIN GLYCOSYLATED A1C: CPT

## 2023-05-30 PROCEDURE — 2580000003 HC RX 258: Performed by: INTERNAL MEDICINE

## 2023-05-30 PROCEDURE — 85025 COMPLETE CBC W/AUTO DIFF WBC: CPT

## 2023-05-30 PROCEDURE — 6370000000 HC RX 637 (ALT 250 FOR IP): Performed by: INTERNAL MEDICINE

## 2023-05-30 PROCEDURE — 6370000000 HC RX 637 (ALT 250 FOR IP): Performed by: FAMILY MEDICINE

## 2023-05-30 PROCEDURE — 80053 COMPREHEN METABOLIC PANEL: CPT

## 2023-05-30 PROCEDURE — 94640 AIRWAY INHALATION TREATMENT: CPT

## 2023-05-30 PROCEDURE — 2700000000 HC OXYGEN THERAPY PER DAY

## 2023-05-30 PROCEDURE — 94761 N-INVAS EAR/PLS OXIMETRY MLT: CPT

## 2023-05-30 PROCEDURE — 36415 COLL VENOUS BLD VENIPUNCTURE: CPT

## 2023-05-30 PROCEDURE — 99232 SBSQ HOSP IP/OBS MODERATE 35: CPT | Performed by: INTERNAL MEDICINE

## 2023-05-30 RX ADMIN — TIOTROPIUM BROMIDE INHALATION SPRAY 2 PUFF: 3.12 SPRAY, METERED RESPIRATORY (INHALATION) at 08:35

## 2023-05-30 RX ADMIN — ARIPIPRAZOLE 5 MG: 5 TABLET ORAL at 08:10

## 2023-05-30 RX ADMIN — ATORVASTATIN CALCIUM 10 MG: 10 TABLET, FILM COATED ORAL at 20:30

## 2023-05-30 RX ADMIN — CYANOCOBALAMIN TAB 1000 MCG 1000 MCG: 1000 TAB at 08:13

## 2023-05-30 RX ADMIN — INSULIN LISPRO 4 UNITS: 100 INJECTION, SOLUTION INTRAVENOUS; SUBCUTANEOUS at 16:17

## 2023-05-30 RX ADMIN — MONTELUKAST SODIUM 10 MG: 10 TABLET, FILM COATED ORAL at 20:29

## 2023-05-30 RX ADMIN — EMPAGLIFLOZIN 10 MG: 10 TABLET, FILM COATED ORAL at 08:11

## 2023-05-30 RX ADMIN — NIACIN 500 MG: 500 TABLET, EXTENDED RELEASE ORAL at 20:30

## 2023-05-30 RX ADMIN — MELATONIN TAB 3 MG 3 MG: 3 TAB at 20:30

## 2023-05-30 RX ADMIN — INSULIN LISPRO 5 UNITS: 100 INJECTION, SOLUTION INTRAVENOUS; SUBCUTANEOUS at 16:17

## 2023-05-30 RX ADMIN — QUETIAPINE FUMARATE 100 MG: 100 TABLET ORAL at 08:11

## 2023-05-30 RX ADMIN — ACETAMINOPHEN 650 MG: 325 TABLET ORAL at 20:33

## 2023-05-30 RX ADMIN — INSULIN GLARGINE 40 UNITS: 100 INJECTION, SOLUTION SUBCUTANEOUS at 08:15

## 2023-05-30 RX ADMIN — FERROUS SULFATE TAB 325 MG (65 MG ELEMENTAL FE) 325 MG: 325 (65 FE) TAB at 08:10

## 2023-05-30 RX ADMIN — INSULIN GLARGINE 40 UNITS: 100 INJECTION, SOLUTION SUBCUTANEOUS at 20:30

## 2023-05-30 RX ADMIN — QUETIAPINE FUMARATE 200 MG: 100 TABLET ORAL at 20:29

## 2023-05-30 RX ADMIN — SODIUM CHLORIDE, PRESERVATIVE FREE 10 ML: 5 INJECTION INTRAVENOUS at 20:30

## 2023-05-30 RX ADMIN — SODIUM CHLORIDE, PRESERVATIVE FREE 10 ML: 5 INJECTION INTRAVENOUS at 08:18

## 2023-05-30 RX ADMIN — ASPIRIN 81 MG: 81 TABLET, CHEWABLE ORAL at 08:10

## 2023-05-30 ASSESSMENT — PAIN DESCRIPTION - ORIENTATION: ORIENTATION: LEFT

## 2023-05-30 ASSESSMENT — PAIN SCALES - GENERAL
PAINLEVEL_OUTOF10: 0
PAINLEVEL_OUTOF10: 3

## 2023-05-30 ASSESSMENT — PAIN DESCRIPTION - LOCATION: LOCATION: KNEE

## 2023-05-30 ASSESSMENT — PAIN DESCRIPTION - DESCRIPTORS: DESCRIPTORS: ACHING

## 2023-05-30 NOTE — CARE COORDINATION
Discharge Planning Assessment:   Patient in Observation with an anticipated short hospitalization length of stay. Chart reviewed and it appears that patient has minimal needs for discharge at this time. Discussed with patients nurse and requested that case management be notified if discharge needs are identified. Case management will continue to follow progress and update discharge plan as needed.

## 2023-05-31 ENCOUNTER — TELEPHONE (OUTPATIENT)
Dept: CARDIOLOGY CLINIC | Age: 73
End: 2023-05-31

## 2023-05-31 VITALS
HEART RATE: 103 BPM | DIASTOLIC BLOOD PRESSURE: 69 MMHG | HEIGHT: 61 IN | OXYGEN SATURATION: 94 % | RESPIRATION RATE: 16 BRPM | SYSTOLIC BLOOD PRESSURE: 133 MMHG | WEIGHT: 278.6 LBS | TEMPERATURE: 97.6 F | BODY MASS INDEX: 52.6 KG/M2

## 2023-05-31 DIAGNOSIS — R07.9 CHEST PAIN, UNSPECIFIED TYPE: Primary | ICD-10-CM

## 2023-05-31 DIAGNOSIS — I25.10 CORONARY ARTERY CALCIFICATION SEEN ON CT SCAN: ICD-10-CM

## 2023-05-31 DIAGNOSIS — R06.02 SOB (SHORTNESS OF BREATH): ICD-10-CM

## 2023-05-31 PROBLEM — R06.00 DYSPNEA: Status: ACTIVE | Noted: 2023-05-31

## 2023-05-31 PROBLEM — R00.2 PALPITATIONS: Status: ACTIVE | Noted: 2023-05-31

## 2023-05-31 LAB
ANION GAP SERPL CALCULATED.3IONS-SCNC: 12 MMOL/L (ref 3–16)
BUN SERPL-MCNC: 14 MG/DL (ref 7–20)
CALCIUM SERPL-MCNC: 9.4 MG/DL (ref 8.3–10.6)
CHLORIDE SERPL-SCNC: 100 MMOL/L (ref 99–110)
CO2 SERPL-SCNC: 25 MMOL/L (ref 21–32)
CREAT SERPL-MCNC: 0.7 MG/DL (ref 0.6–1.2)
EST. AVERAGE GLUCOSE BLD GHB EST-MCNC: 131.2 MG/DL
GFR SERPLBLD CREATININE-BSD FMLA CKD-EPI: >60 ML/MIN/{1.73_M2}
GLUCOSE BLD-MCNC: 201 MG/DL (ref 70–99)
GLUCOSE BLD-MCNC: 211 MG/DL (ref 70–99)
GLUCOSE SERPL-MCNC: 187 MG/DL (ref 70–99)
HBA1C MFR BLD: 6.2 %
PERFORMED ON: ABNORMAL
PERFORMED ON: ABNORMAL
POTASSIUM SERPL-SCNC: 4.3 MMOL/L (ref 3.5–5.1)
SODIUM SERPL-SCNC: 137 MMOL/L (ref 136–145)

## 2023-05-31 PROCEDURE — 80048 BASIC METABOLIC PNL TOTAL CA: CPT

## 2023-05-31 PROCEDURE — 94640 AIRWAY INHALATION TREATMENT: CPT

## 2023-05-31 PROCEDURE — 2580000003 HC RX 258: Performed by: INTERNAL MEDICINE

## 2023-05-31 PROCEDURE — 6370000000 HC RX 637 (ALT 250 FOR IP): Performed by: INTERNAL MEDICINE

## 2023-05-31 PROCEDURE — 94761 N-INVAS EAR/PLS OXIMETRY MLT: CPT

## 2023-05-31 PROCEDURE — 6360000002 HC RX W HCPCS: Performed by: INTERNAL MEDICINE

## 2023-05-31 PROCEDURE — 36415 COLL VENOUS BLD VENIPUNCTURE: CPT

## 2023-05-31 PROCEDURE — 2700000000 HC OXYGEN THERAPY PER DAY

## 2023-05-31 PROCEDURE — G0378 HOSPITAL OBSERVATION PER HR: HCPCS

## 2023-05-31 PROCEDURE — 99232 SBSQ HOSP IP/OBS MODERATE 35: CPT | Performed by: INTERNAL MEDICINE

## 2023-05-31 RX ORDER — POTASSIUM CHLORIDE 20 MEQ/1
20 TABLET, EXTENDED RELEASE ORAL DAILY
Qty: 30 TABLET | Refills: 1 | Status: SHIPPED | OUTPATIENT
Start: 2023-05-31

## 2023-05-31 RX ORDER — METOPROLOL SUCCINATE 25 MG/1
25 TABLET, EXTENDED RELEASE ORAL DAILY
Qty: 30 TABLET | Refills: 3 | Status: SHIPPED | OUTPATIENT
Start: 2023-05-31

## 2023-05-31 RX ADMIN — ASPIRIN 81 MG: 81 TABLET, CHEWABLE ORAL at 08:16

## 2023-05-31 RX ADMIN — TIOTROPIUM BROMIDE INHALATION SPRAY 2 PUFF: 3.12 SPRAY, METERED RESPIRATORY (INHALATION) at 08:10

## 2023-05-31 RX ADMIN — INSULIN LISPRO 5 UNITS: 100 INJECTION, SOLUTION INTRAVENOUS; SUBCUTANEOUS at 08:22

## 2023-05-31 RX ADMIN — ENOXAPARIN SODIUM 40 MG: 100 INJECTION SUBCUTANEOUS at 08:16

## 2023-05-31 RX ADMIN — FERROUS SULFATE TAB 325 MG (65 MG ELEMENTAL FE) 325 MG: 325 (65 FE) TAB at 08:15

## 2023-05-31 RX ADMIN — QUETIAPINE FUMARATE 100 MG: 100 TABLET ORAL at 08:16

## 2023-05-31 RX ADMIN — INSULIN LISPRO 5 UNITS: 100 INJECTION, SOLUTION INTRAVENOUS; SUBCUTANEOUS at 12:21

## 2023-05-31 RX ADMIN — INSULIN GLARGINE 40 UNITS: 100 INJECTION, SOLUTION SUBCUTANEOUS at 08:21

## 2023-05-31 RX ADMIN — EMPAGLIFLOZIN 10 MG: 10 TABLET, FILM COATED ORAL at 08:16

## 2023-05-31 RX ADMIN — FUROSEMIDE 60 MG: 40 TABLET ORAL at 08:15

## 2023-05-31 RX ADMIN — CYANOCOBALAMIN TAB 1000 MCG 1000 MCG: 1000 TAB at 08:16

## 2023-05-31 RX ADMIN — INSULIN LISPRO 2 UNITS: 100 INJECTION, SOLUTION INTRAVENOUS; SUBCUTANEOUS at 12:21

## 2023-05-31 RX ADMIN — SODIUM CHLORIDE, PRESERVATIVE FREE 10 ML: 5 INJECTION INTRAVENOUS at 08:21

## 2023-05-31 RX ADMIN — ACETAMINOPHEN 650 MG: 325 TABLET ORAL at 04:33

## 2023-05-31 RX ADMIN — ARIPIPRAZOLE 5 MG: 5 TABLET ORAL at 08:15

## 2023-05-31 RX ADMIN — INSULIN LISPRO 2 UNITS: 100 INJECTION, SOLUTION INTRAVENOUS; SUBCUTANEOUS at 08:22

## 2023-05-31 ASSESSMENT — PAIN SCALES - GENERAL
PAINLEVEL_OUTOF10: 0
PAINLEVEL_OUTOF10: 0
PAINLEVEL_OUTOF10: 8

## 2023-05-31 ASSESSMENT — PAIN DESCRIPTION - LOCATION: LOCATION: HEAD

## 2023-05-31 ASSESSMENT — PAIN DESCRIPTION - DESCRIPTORS: DESCRIPTORS: ACHING

## 2023-05-31 NOTE — PROGRESS NOTES
[address] Daily Progress Note      Admit Date:  5/26/2023    Chief Complaint   Patient presents with    Loss of Consciousness     Had syncopeal episode at home. Never happened before; denies hitting head; brought in by [facility]; FSBS 213; normally on 2lpm at home oxygen on 3lpm        Subjective:  Ms. Robson Fregoso doing okay today, up in chair during assessment. Denies chest pain at this time.  States she had episode of dizziness, felt the room spinning    Objective:   BP (!) 120/54   Pulse 88   Temp 96.9 °F (36.1 °C) (Temporal)   Resp 18   Ht 5' 1\" (1.549 m)   Wt 278 lb 9.6 oz (126.4 kg)   SpO2 98%   BMI 52.64 kg/m²     Intake/Output Summary (Last 24 hours) at 5/31/2023 1131  Last data filed at 5/31/2023 0430  Gross per 24 hour   Intake 720 ml   Output 1350 ml   Net -630 ml       TELEMETRY: Sinus     Physical Exam:  General:  Awake, alert, oriented x 3, NAD  Skin:  Warm and dry  Neck:  JVD flat  Chest:  rhonchi  Cardiovascular:  RRR S1S2, no S3, no mrmr  Abdomen:  Soft, ND, NT, No HSM  Extremities:  2+ edema    Medications:    insulin lispro  5 Units SubCUTAneous TID WC    insulin lispro  0-8 Units SubCUTAneous TID WC    insulin lispro  0-4 Units SubCUTAneous Nightly    sodium chloride flush  5-40 mL IntraVENous 2 times per day    enoxaparin  40 mg SubCUTAneous Daily    ARIPiprazole  5 mg Oral Daily    aspirin  81 mg Oral Daily    empagliflozin  10 mg Oral Daily    ferrous sulfate  325 mg Oral Daily with breakfast    furosemide  60 mg Oral Daily    insulin glargine  40 Units SubCUTAneous BID    montelukast  10 mg Oral Nightly    QUEtiapine  100 mg Oral Daily    QUEtiapine  200 mg Oral Nightly    atorvastatin  10 mg Oral Nightly    tiotropium  2 puff Inhalation Daily    vitamin B-12  1,000 mcg Oral Daily    niacin  500 mg Oral Nightly      sodium chloride      dextrose       polyethylene glycol, melatonin, sodium chloride flush, sodium chloride, potassium chloride **OR** potassium alternative
CLINICAL PHARMACY NOTE: MEDS TO BEDS    Total # of Prescriptions Filled: 2   The following medications were delivered to the patient:  Metoprolol ER 25 mg  Potassium ER 20    Additional Documentation:  Delivered to patient=signed  Ok to be delivered per Sabina Vazquez CPhT
Data- discharge order received, pt verbalized agreement to discharge, disposition to previous residence, no needs for HHC/DME. Action- discharge instructions prepared and given to pt, pt verbalized understanding. Medication information packet given r/t NEW and/or CHANGED prescriptions emphasizing name/purpose/side effects, pt verbalized understanding. Discharge instruction summary: Diet- regular, Activity- up as tolerated, Primary Care Physician as follows: Pato Smith [phone] f/u appointment in 1 week, immunizations reviewed and updated, prescription medications filled at outpatient pharmacy. Inpatient surgical procedure precautions reviewed: [facility] to be scheduled outpatient. CHF Education reviewed. Pt/ Family has had a total of 60 minutes CHF education this admission encounter. 1. WEIGHT: Admit Weight - Scale: 277 lb (125.6 kg) (05/26/23 1951)        Today  Weight - Scale: 278 lb 9.6 oz (126.4 kg) (05/31/23 0430)       2. O2 SAT.: SpO2: 94 % (05/31/23 1215)    Response- Pt belongings gathered, IV removed. Disposition is home (no HHC/DME needs), transported with RN on 2L of oxygen, taken to lobby via w/c w/ daughter in law, no complications. Pt had oxygen available in the car.
Occupational Therapy/Physical Therapy  Patient chart reviewed and assessed for therapy evaluations this date. Per patient and RN, patient independent in room and ambulates independently. Patient feels they are at their baseline and that they do not need therapy, orders acknowledged and canceled.   Thanks,     Brandy Boggs, OTR/L [id number]   Maria E Lopez, [facility], DPT [id number]
Pt awake in chair watching tv. VSS, assessment complete and medications given. Denies any needs. Call light in reach and pt refusing chair alarm.
Pt awake in chair. VSS and pt denies any needs. Call light in reach and pt refusing chair alarm.
This RN informed pt she has to wear gripper socks when getting out of chair but pt refusing socks as well as alarm.
V2.0    [facility] Progress Note      Name:  Michela Garcia /Age/Sex: 1950  (81 y.o. female)   MRN & CSN:  [id number] & [id number] Encounter Date/Time: 2023 12:12 PM EDT   Location:  [place] [id number] PCP: Kody Damon MD     [address] [place] [address]       Hospital Day: 5    Assessment and Recommendations   Michela Garcia is a 81 y.o. female with pmh of past medical history of HFpEF, hypertension, hyperlipidemia, IDDM 2, GERD, bipolar disorder type I, tachycardia, obstructive sleep apnea on 2 L oxygen, morbid obesity who presents with Pre-syncope    Presyncope and Tachycardia:  EP Cardiology consult appreciated. Recommendations noted. No arrhythmias noted on telemetry. Echo 2023: LVEF 55 to 60%. No RWMA. Chest discomfort:  Underlying CAD with mildly elevated troponin. Scheduled for [facility] today. Continue aspirin and statin. DM on long-term insulin:  A1c 6.7% 3/20/2023. Glucose on basal/prandial insulin with SSI. Hypokalemia: Replaced. Monitor electrolytes and replace as needed. Morbid obesity: Body mass index is 53 kg/m². BMI Complicating assessment and treatment. Placing patient at risk for multiple co-morbidities as well as early death and contributing to the patient's presentation. Education, and counseling provided. Diet ADULT DIET; Regular; 4 carb choices (60 gm/meal);  Low Fat/Low Chol/High Fiber/2 gm Na   DVT Prophylaxis [x] Lovenox, []  Heparin, [] SCDs, [] Ambulation,  [] Eliquis, [] Xarelto  [] Coumadin   Code Status DNR-CCA   Disposition From:  home   Expected Disposition: home  Estimated Date of Discharge: next Wednesday   Patient requires continued admission due to  ischemic w/u   Surrogate Decision Maker/ POA       Personally reviewed Lab Studies and Imaging     Discussed management of the case with cardiologywho recommended ischemic w/u    EKG interpreted personally and results sinus tachycardia       Drugs that require monitoring for
[facility] Daily Progress Note      Admit Date:  5/26/2023    Chief Complaint   Patient presents with    Loss of Consciousness     Had syncopeal episode at home. Never happened before; denies hitting head; brought in by [facility] twp; FSBS 213; normally on 2lpm at home oxygen on 3lpm        Subjective:  Ms. Ady Keys doing okay today, up in chair during assessment. Daughter at bedside. Denies chest pain at this time.      Objective:   BP (!) 150/68 Comment: rn notified  Pulse (!) 105   Temp 97.7 °F (36.5 °C) (Temporal)   Resp 16   Ht 5' 1\" (1.549 m)   Wt 280 lb 8 oz (127.2 kg)   SpO2 96%   BMI 53.00 kg/m²     Intake/Output Summary (Last 24 hours) at 5/30/2023 1637  Last data filed at 5/30/2023 1310  Gross per 24 hour   Intake 10 ml   Output 1500 ml   Net -1490 ml       TELEMETRY: Sinus     Physical Exam:  General:  Awake, alert, oriented x 3, NAD  Skin:  Warm and dry  Neck:  JVD flat  Chest:  rhonchi  Cardiovascular:  RRR S1S2, no S3, no mrmr  Abdomen:  Soft, ND, NT, No HSM  Extremities:  2+ edema    Medications:    insulin lispro  5 Units SubCUTAneous TID WC    insulin lispro  0-8 Units SubCUTAneous TID WC    insulin lispro  0-4 Units SubCUTAneous Nightly    sodium chloride flush  5-40 mL IntraVENous 2 times per day    enoxaparin  40 mg SubCUTAneous Daily    ARIPiprazole  5 mg Oral Daily    aspirin  81 mg Oral Daily    empagliflozin  10 mg Oral Daily    ferrous sulfate  325 mg Oral Daily with breakfast    furosemide  60 mg Oral Daily    insulin glargine  40 Units SubCUTAneous BID    montelukast  10 mg Oral Nightly    QUEtiapine  100 mg Oral Daily    QUEtiapine  200 mg Oral Nightly    atorvastatin  10 mg Oral Nightly    tiotropium  2 puff Inhalation Daily    vitamin B-12  1,000 mcg Oral Daily    niacin  500 mg Oral Nightly      sodium chloride      dextrose       polyethylene glycol, melatonin, sodium chloride flush, sodium chloride, potassium chloride **OR** potassium alternative oral replacement
bilaterally   Cardiovascular: Auscultation: regular rate and rhythm, normal S1S2, no murmur, rub or gallop  Palpation:  Nl PMI  JVP:  normal  Extremities: No Edema  Abdomen:  Soft, non-tender  Normal bowel sounds  Extremities:   No Cyanosis or Clubbing  Neurological/Psychiatric:  Oriented to time, place, and person  Non-anxious  Skin Warm and dry    Assessment:    Principal Problem:  Pre-syncope  Chest discomfort and SOB  CAD-probable due to               CT of the chest with multivessel coronary calcification  Poorly controlled DM  HTN  Morbid obesity: Body mass index is 52.85 kg/m². Suspected ALEXANDRU   Hypokalemia  HFpEF  HLD      Plan: NPO in the am for coronary evaluation. Pt is a poor candidate for stress testing due to severe lung disease and morbid obesity.   Creat is now normal.         Sha Andrews MD, 5/29/2023 10:57 AM
mg Oral Nightly      Infusions:    sodium chloride      dextrose       PRN Meds: polyethylene glycol, 17 g, Daily PRN  melatonin, 3 mg, Nightly PRN  sodium chloride flush, 5-40 mL, PRN  sodium chloride, , PRN  potassium chloride, 40 mEq, PRN   Or  potassium alternative oral replacement, 40 mEq, PRN   Or  potassium chloride, 10 mEq, PRN  ondansetron, 4 mg, Q8H PRN   Or  ondansetron, 4 mg, Q6H PRN  bisacodyl, 5 mg, Daily PRN  aluminum & magnesium hydroxide-simethicone, 30 mL, Q6H PRN  acetaminophen, 650 mg, Q6H PRN   Or  acetaminophen, 650 mg, Q6H PRN  albuterol sulfate HFA, 2 puff, Q6H PRN  dextrose bolus, 125 mL, PRN   Or  dextrose bolus, 250 mL, PRN  dextrose, , Continuous PRN        Labs and Imaging   XR CHEST PORTABLE    Result Date: 5/26/2023  EXAMINATION: ONE XRAY VIEW OF THE CHEST 5/26/2023 2:21 pm COMPARISON: None. HISTORY: ORDERING SYSTEM PROVIDED HISTORY: syncope TECHNOLOGIST PROVIDED HISTORY: Reason for exam:->syncope FINDINGS: Normal cardiomediastinal silhouette. No acute airspace infiltrate. No pneumothorax or pleural effusion. No acute cardiopulmonary findings     CT CHEST PULMONARY EMBOLISM W CONTRAST    Result Date: 5/26/2023  EXAMINATION: CTA OF THE CHEST 5/26/2023 3:54 pm TECHNIQUE: CTA of the chest was performed after the administration of intravenous contrast.  Multiplanar reformatted images are provided for review. MIP images are provided for review. Automated exposure control, iterative reconstruction, and/or weight based adjustment of the mA/kV was utilized to reduce the radiation dose to as low as reasonably achievable.  COMPARISON: CTA chest March 20, 2023 and chest x-ray performed earlier today HISTORY: ORDERING SYSTEM PROVIDED HISTORY: ro PE TECHNOLOGIST PROVIDED HISTORY: Reason for exam:->ro PE Decision Support Exception - unselect if not a suspected or confirmed emergency medical condition->Emergency Medical Condition (MA) Reason for Exam: Loss of Consciousness (Had syncopeal

## 2023-05-31 NOTE — DISCHARGE INSTRUCTIONS
lotion. Normal Observations:  Soreness or tenderness which may last one week. Mild oozing from the incision site. Possible bruising that could last 2 weeks. Activity:  You may resume driving 24 hours following the procedure. You may resume normal activity in 5 days or after the wound heals. Avoid lifting more than 10 pounds for 5 days or until the wound heals. Avoid strenuous exercise or activity for 1 week. Nutrition:  Regular diet. Drink at least 8 to 10 glasses of decaffeinated, non-alcoholic fluid for the next 24 hours to flush the x-ray dye used for your angiogram out of your body. Call your doctor immediately if your condition worsens, for any other concerns, for a follow-up appointment or if you experience any of the following:  Significant bleeding that does not stop after 10 minutes of applying firm pressure on the puncture site. Increased swelling on the groin or leg. Unusual pain, numbness, or tingling of the groin or down the leg. Any signs of infection such as: redness, yellow drainage at the site, swelling or pain. Other Instructions:  Hold Metformin or Metformin containing drugs for 48 hours after procedure.

## 2023-05-31 NOTE — TELEPHONE ENCOUNTER
Date of Procedure: Wednesday 6/14/23 @ [facility]    Time of arrival: 9:30 am     Procedure time: 11:00 am   I made Huan Caicedo aware that her procedure might not start exactly at 11:00 am due to the anesthesia, she v/u     Huan Caicedo is agreeable to date and time. Reviewed instructions and she verbalized understanding. Encouraged to call with any questions or concerns.    Published on [employer] and e-mailed to cath lab

## 2023-05-31 NOTE — PLAN OF CARE
Problem: Discharge Planning  Goal: Discharge to home or other facility with appropriate resources  5/31/2023 1746 by Niko Clark RN  Outcome: Completed  5/31/2023 1213 by Niko Clark RN  Outcome: Progressing     Problem: Safety - Adult  Goal: Free from fall injury  5/31/2023 1746 by Niko Clark RN  Outcome: Completed  5/31/2023 1213 by Niko Clark RN  Outcome: Progressing     Problem: Chronic Conditions and Co-morbidities  Goal: Patient's chronic conditions and co-morbidity symptoms are monitored and maintained or improved  5/31/2023 1746 by Niko Clark RN  Outcome: Completed  5/31/2023 1213 by Niko Clark RN  Outcome: Progressing     Problem: Respiratory - Adult  Goal: Achieves optimal ventilation and oxygenation  5/31/2023 1746 by Niko Clark RN  Outcome: Completed  5/31/2023 1213 by Niko Clark RN  Outcome: Progressing     Problem: Cardiovascular - Adult  Goal: Maintains optimal cardiac output and hemodynamic stability  5/31/2023 1746 by Niko Clark RN  Outcome: Completed  5/31/2023 1213 by Niko Clark RN  Outcome: Progressing  Goal: Absence of cardiac dysrhythmias or at baseline  5/31/2023 1746 by Niko Clark RN  Outcome: Completed  5/31/2023 1213 by Niko Clark RN  Outcome: Progressing     Problem: Metabolic/Fluid and Electrolytes - Adult  Goal: Electrolytes maintained within normal limits  5/31/2023 1746 by Niko Clark RN  Outcome: Completed  5/31/2023 1213 by Niko Clark RN  Outcome: Progressing  Goal: Hemodynamic stability and optimal renal function maintained  5/31/2023 1746 by Niko Clark RN  Outcome: Completed  5/31/2023 1213 by Niko Clark RN  Outcome: Progressing  Goal: Glucose maintained within prescribed range  5/31/2023 1746 by Niko Clark RN  Outcome: Completed  5/31/2023 1213 by Niko Clark RN  Outcome: Progressing     Problem: Hematologic - Adult  Goal: Maintains hematologic stability  5/31/2023 1746 by Niko Clark RN  Outcome: Completed  5/31/2023 1213 by Niko Clark
Problem: Discharge Planning  Goal: Discharge to home or other facility with appropriate resources  Outcome: Progressing     Problem: Safety - Adult  Goal: Free from fall injury  Outcome: Progressing     Problem: Chronic Conditions and Co-morbidities  Goal: Patient's chronic conditions and co-morbidity symptoms are monitored and maintained or improved  Outcome: Progressing     Problem: Respiratory - Adult  Goal: Achieves optimal ventilation and oxygenation  Outcome: Progressing     Problem: Cardiovascular - Adult  Goal: Maintains optimal cardiac output and hemodynamic stability  Outcome: Progressing  Goal: Absence of cardiac dysrhythmias or at baseline  Outcome: Progressing     Problem: Metabolic/Fluid and Electrolytes - Adult  Goal: Electrolytes maintained within normal limits  Outcome: Progressing  Goal: Hemodynamic stability and optimal renal function maintained  Outcome: Progressing  Goal: Glucose maintained within prescribed range  Outcome: Progressing     Problem: Hematologic - Adult  Goal: Maintains hematologic stability  Outcome: Progressing     Problem: Pain  Goal: Verbalizes/displays adequate comfort level or baseline comfort level  Outcome: Progressing     Problem: Neurosensory - Adult  Goal: Achieves maximal functionality and self care  Outcome: Progressing     Problem: Skin/Tissue Integrity - Adult  Goal: Skin integrity remains intact  Outcome: Progressing     Problem: Musculoskeletal - Adult  Goal: Return ADL status to a safe level of function  Outcome: Progressing     Problem: Gastrointestinal - Adult  Goal: Maintains adequate nutritional intake  Outcome: Progressing     Problem: Genitourinary - Adult  Goal: Absence of urinary retention  Outcome: Progressing     Problem: Infection - Adult  Goal: Absence of infection at discharge  Outcome: Progressing  Goal: Absence of infection during hospitalization  Outcome: Progressing     Problem: Anxiety  Goal: Will report anxiety at manageable
Problem: Discharge Planning  Goal: Discharge to home or other facility with appropriate resources  Outcome: Progressing     Problem: Safety - Adult  Goal: Free from fall injury  Outcome: Progressing     Problem: Chronic Conditions and Co-morbidities  Goal: Patient's chronic conditions and co-morbidity symptoms are monitored and maintained or improved  Outcome: Progressing     Problem: Respiratory - Adult  Goal: Achieves optimal ventilation and oxygenation  Outcome: Progressing     Problem: Cardiovascular - Adult  Goal: Maintains optimal cardiac output and hemodynamic stability  Outcome: Progressing  Goal: Absence of cardiac dysrhythmias or at baseline  Outcome: Progressing     Problem: Metabolic/Fluid and Electrolytes - Adult  Goal: Electrolytes maintained within normal limits  Outcome: Progressing  Goal: Hemodynamic stability and optimal renal function maintained  Outcome: Progressing  Goal: Glucose maintained within prescribed range  Outcome: Progressing     Problem: Hematologic - Adult  Goal: Maintains hematologic stability  Outcome: Progressing     Problem: Pain  Goal: Verbalizes/displays adequate comfort level or baseline comfort level  Outcome: Progressing     Problem: Neurosensory - Adult  Goal: Achieves maximal functionality and self care  Outcome: Progressing     Problem: Skin/Tissue Integrity - Adult  Goal: Skin integrity remains intact  Outcome: Progressing     Problem: Musculoskeletal - Adult  Goal: Return ADL status to a safe level of function  Outcome: Progressing     Problem: Gastrointestinal - Adult  Goal: Maintains adequate nutritional intake  Outcome: Progressing     Problem: Genitourinary - Adult  Goal: Absence of urinary retention  Outcome: Progressing     Problem: Infection - Adult  Goal: Absence of infection at discharge  Outcome: Progressing  Goal: Absence of infection during hospitalization  Outcome: Progressing     Problem: Anxiety  Goal: Will report anxiety at manageable
Progressing  5/30/2023 1313 by Michael Lewis RN  Outcome: Progressing     Problem: Behavior  Goal: Pt/Family maintain appropriate behavior and adhere to behavioral management agreement, if implemented  Description: INTERVENTIONS:  1. Assess patient/family's coping skills and  non-compliant behavior (including use of illegal substances)  2. Notify security of behavior or suspected illegal substances which indicate the need for search of the family and/or belongings  3. Encourage verbalization of thoughts and concerns in a socially appropriate manner  4. Utilize positive, consistent limit setting strategies supporting safety of patient, staff and others  5. Encourage participation in the decision making process about the behavioral management agreement  6. If a visitor's behavior poses a threat to safety call refer to organization policy.   7. Initiate consult with , Psychosocial CNS, Spiritual Care as appropriate  5/30/2023 2048 by Mary Starr RN  Outcome: Progressing  5/30/2023 1313 by Michael Lewis RN  Outcome: Progressing     Problem: ABCDS Injury Assessment  Goal: Absence of physical injury  5/30/2023 2048 by Mary Starr RN  Outcome: Progressing  5/30/2023 1313 by Michael Lewis RN  Outcome: Progressing

## 2023-05-31 NOTE — DISCHARGE SUMMARY
V2.0  Discharge Summary    Name:  Israel Spears /Age/Sex: 1950 (81 y.o. female)   Admit Date: 2023  Discharge Date: 23    MRN & CSN:  [id number] & [id number] Encounter Date and Time 23 1:16 PM EDT    Attending:  Zach Benítez MD Discharging Provider: Zach Benítez MD       Hospital Course:     Brief HPI: Israel Spears is a 81 y.o. female with pmh of past medical history of HFpEF, hypertension, hyperlipidemia, IDDM 2, GERD, bipolar disorder type I, tachycardia, obstructive sleep apnea on 2 L oxygen, morbid obesity who presents with Pre-syncope. Presyncope and Tachycardia:  EP Cardiology consulted. No arrhythmias noted on telemetry except for sinus tachycardia. Echo 2023: LVEF 55 to 60%. No RWMA. Chronic diastolic HF:  No acute decompensation. Echo 3/22/2023: G2 DD. LVEF 55 to 60%. Continue Lasix and Jardiance. Chest discomfort:  Underlying CAD with mildly elevated troponin. Continue aspirin and statin. Cardiology recommending [address] [place] [address] outpatient. DM on long-term insulin:  A1c 6.7% 3/20/2023. Resume Trulicity and insulin outpatient. Hypokalemia: Replaced. Chronic hypoxic respiratory failure due to COPD:  Stable on 2 L nasal cannula. Continue inhalers. Morbid obesity: Body mass index is 53 kg/m². BMI Complicating assessment and treatment. Placing patient at risk for multiple co-morbidities as well as early death and contributing to the patient's presentation. Education, and counseling provided. The patient expressed appropriate understanding of, and agreement with the discharge recommendations, medications, and plan. Consults this admission:  IP CONSULT TO CARDIOLOGY    Discharge Diagnosis:   Pre-syncope      Discharge Instruction:   Follow up appointments: Cardiologist  Primary care physician: Sebastian Rene MD within 2 weeks  Diet: ADULT DIET; Regular; 4 carb choices (60 gm/meal);  Low Fat/Low Chol/High Fiber/2 gm

## 2023-06-02 ENCOUNTER — HOSPITAL ENCOUNTER (OUTPATIENT)
Dept: NON INVASIVE DIAGNOSTICS | Age: 73
Discharge: HOME | End: 2023-06-02

## 2023-06-14 ENCOUNTER — HOSPITAL ENCOUNTER (INPATIENT)
Dept: CARDIAC CATH/INVASIVE PROCEDURES | Age: 73
LOS: 1 days | Discharge: HOME | DRG: 247 | End: 2023-06-15
Attending: INTERNAL MEDICINE | Admitting: INTERNAL MEDICINE
Payer: MEDICARE

## 2023-06-14 PROBLEM — I25.10 CAD IN NATIVE ARTERY: Status: ACTIVE | Noted: 2023-06-14

## 2023-06-14 PROBLEM — E11.9 DIABETES MELLITUS TYPE 2, UNCOMPLICATED, ON LONG TERM INSULIN PUMP (HCC): Status: ACTIVE | Noted: 2018-06-02

## 2023-06-14 PROBLEM — E78.2 MIXED HYPERLIPIDEMIA: Status: ACTIVE | Noted: 2023-05-18

## 2023-06-14 PROBLEM — E66.9 OBESITY: Status: ACTIVE | Noted: 2023-03-23

## 2023-06-14 PROBLEM — Z96.41 DIABETES MELLITUS TYPE 2, UNCOMPLICATED, ON LONG TERM INSULIN PUMP (HCC): Status: ACTIVE | Noted: 2018-06-02

## 2023-06-14 LAB
ANION GAP SERPL CALCULATED.3IONS-SCNC: 13 MMOL/L (ref 3–16)
BUN SERPL-MCNC: 12 MG/DL (ref 7–20)
CALCIUM SERPL-MCNC: 10 MG/DL (ref 8.3–10.6)
CHLORIDE SERPL-SCNC: 96 MMOL/L (ref 99–110)
CO2 SERPL-SCNC: 28 MMOL/L (ref 21–32)
CREAT SERPL-MCNC: 0.8 MG/DL (ref 0.6–1.2)
DEPRECATED RDW RBC AUTO: 15.1 % (ref 12.4–15.4)
GFR SERPLBLD CREATININE-BSD FMLA CKD-EPI: >60 ML/MIN/{1.73_M2}
GLUCOSE BLD-MCNC: 334 MG/DL (ref 70–99)
GLUCOSE BLD-MCNC: 344 MG/DL (ref 70–99)
GLUCOSE SERPL-MCNC: 285 MG/DL (ref 70–99)
HCT VFR BLD AUTO: 37.3 % (ref 36–48)
HGB BLD-MCNC: 11.9 G/DL (ref 12–16)
LEFT VENTRICULAR EJECTION FRACTION MODE: NORMAL
LV EF: 55 %
LV EF: 55 %
LVEF MODALITY: NORMAL
MCH RBC QN AUTO: 30.2 PG (ref 26–34)
MCHC RBC AUTO-ENTMCNC: 31.9 G/DL (ref 31–36)
MCV RBC AUTO: 94.7 FL (ref 80–100)
PERFORMED ON: ABNORMAL
PERFORMED ON: ABNORMAL
PLATELET # BLD AUTO: 161 K/UL (ref 135–450)
PMV BLD AUTO: 8.9 FL (ref 5–10.5)
POC ACT LR: 213 SEC
POC ACT LR: 237 SEC
POTASSIUM SERPL-SCNC: 3.8 MMOL/L (ref 3.5–5.1)
RBC # BLD AUTO: 3.94 M/UL (ref 4–5.2)
SODIUM SERPL-SCNC: 137 MMOL/L (ref 136–145)
WBC # BLD AUTO: 5 K/UL (ref 4–11)

## 2023-06-14 PROCEDURE — C1753 CATH, INTRAVAS ULTRASOUND: HCPCS

## 2023-06-14 PROCEDURE — 2500000003 HC RX 250 WO HCPCS

## 2023-06-14 PROCEDURE — B2111ZZ FLUOROSCOPY OF MULTIPLE CORONARY ARTERIES USING LOW OSMOLAR CONTRAST: ICD-10-PCS | Performed by: INTERNAL MEDICINE

## 2023-06-14 PROCEDURE — C9600 PERC DRUG-EL COR STENT SING: HCPCS

## 2023-06-14 PROCEDURE — 93458 L HRT ARTERY/VENTRICLE ANGIO: CPT | Performed by: INTERNAL MEDICINE

## 2023-06-14 PROCEDURE — 93005 ELECTROCARDIOGRAM TRACING: CPT | Performed by: INTERNAL MEDICINE

## 2023-06-14 PROCEDURE — 6360000004 HC RX CONTRAST MEDICATION: Performed by: INTERNAL MEDICINE

## 2023-06-14 PROCEDURE — 92928 PRQ TCAT PLMT NTRAC ST 1 LES: CPT | Performed by: INTERNAL MEDICINE

## 2023-06-14 PROCEDURE — 6370000000 HC RX 637 (ALT 250 FOR IP): Performed by: INTERNAL MEDICINE

## 2023-06-14 PROCEDURE — B2151ZZ FLUOROSCOPY OF LEFT HEART USING LOW OSMOLAR CONTRAST: ICD-10-PCS | Performed by: INTERNAL MEDICINE

## 2023-06-14 PROCEDURE — 92978 ENDOLUMINL IVUS OCT C 1ST: CPT | Performed by: INTERNAL MEDICINE

## 2023-06-14 PROCEDURE — 93458 L HRT ARTERY/VENTRICLE ANGIO: CPT

## 2023-06-14 PROCEDURE — C1887 CATHETER, GUIDING: HCPCS

## 2023-06-14 PROCEDURE — 6370000000 HC RX 637 (ALT 250 FOR IP): Performed by: HOSPITALIST

## 2023-06-14 PROCEDURE — C1725 CATH, TRANSLUMIN NON-LASER: HCPCS

## 2023-06-14 PROCEDURE — 92978 ENDOLUMINL IVUS OCT C 1ST: CPT

## 2023-06-14 PROCEDURE — C1894 INTRO/SHEATH, NON-LASER: HCPCS

## 2023-06-14 PROCEDURE — 36415 COLL VENOUS BLD VENIPUNCTURE: CPT

## 2023-06-14 PROCEDURE — C1874 STENT, COATED/COV W/DEL SYS: HCPCS

## 2023-06-14 PROCEDURE — B241ZZ3 ULTRASONOGRAPHY OF MULTIPLE CORONARY ARTERIES, INTRAVASCULAR: ICD-10-PCS | Performed by: INTERNAL MEDICINE

## 2023-06-14 PROCEDURE — 6360000002 HC RX W HCPCS: Performed by: INTERNAL MEDICINE

## 2023-06-14 PROCEDURE — C1769 GUIDE WIRE: HCPCS

## 2023-06-14 PROCEDURE — 2580000003 HC RX 258: Performed by: INTERNAL MEDICINE

## 2023-06-14 PROCEDURE — 85347 COAGULATION TIME ACTIVATED: CPT

## 2023-06-14 PROCEDURE — 80048 BASIC METABOLIC PNL TOTAL CA: CPT

## 2023-06-14 PROCEDURE — 99152 MOD SED SAME PHYS/QHP 5/>YRS: CPT | Performed by: INTERNAL MEDICINE

## 2023-06-14 PROCEDURE — 6360000002 HC RX W HCPCS

## 2023-06-14 PROCEDURE — 83036 HEMOGLOBIN GLYCOSYLATED A1C: CPT

## 2023-06-14 PROCEDURE — 94760 N-INVAS EAR/PLS OXIMETRY 1: CPT

## 2023-06-14 PROCEDURE — 2709999900 HC NON-CHARGEABLE SUPPLY

## 2023-06-14 PROCEDURE — 94640 AIRWAY INHALATION TREATMENT: CPT

## 2023-06-14 PROCEDURE — 3700000001 HC ADD 15 MINUTES (ANESTHESIA)

## 2023-06-14 PROCEDURE — 85027 COMPLETE CBC AUTOMATED: CPT

## 2023-06-14 PROCEDURE — 4A023N7 MEASUREMENT OF CARDIAC SAMPLING AND PRESSURE, LEFT HEART, PERCUTANEOUS APPROACH: ICD-10-PCS | Performed by: INTERNAL MEDICINE

## 2023-06-14 PROCEDURE — 3700000000 HC ANESTHESIA ATTENDED CARE

## 2023-06-14 PROCEDURE — 027034Z DILATION OF CORONARY ARTERY, ONE ARTERY WITH DRUG-ELUTING INTRALUMINAL DEVICE, PERCUTANEOUS APPROACH: ICD-10-PCS | Performed by: INTERNAL MEDICINE

## 2023-06-14 PROCEDURE — 2700000000 HC OXYGEN THERAPY PER DAY

## 2023-06-14 RX ORDER — HYDRALAZINE HYDROCHLORIDE 20 MG/ML
10 INJECTION INTRAMUSCULAR; INTRAVENOUS EVERY 6 HOURS PRN
Status: DISCONTINUED | OUTPATIENT
Start: 2023-06-14 | End: 2023-06-15 | Stop reason: HOSPADM

## 2023-06-14 RX ORDER — FUROSEMIDE 40 MG/1
40 TABLET ORAL DAILY
Status: DISCONTINUED | OUTPATIENT
Start: 2023-06-15 | End: 2023-06-15 | Stop reason: HOSPADM

## 2023-06-14 RX ORDER — SODIUM CHLORIDE 0.9 % (FLUSH) 0.9 %
5-40 SYRINGE (ML) INJECTION EVERY 12 HOURS SCHEDULED
Status: DISCONTINUED | OUTPATIENT
Start: 2023-06-14 | End: 2023-06-15 | Stop reason: HOSPADM

## 2023-06-14 RX ORDER — ACETAMINOPHEN 325 MG/1
650 TABLET ORAL EVERY 4 HOURS PRN
Status: DISCONTINUED | OUTPATIENT
Start: 2023-06-14 | End: 2023-06-15 | Stop reason: HOSPADM

## 2023-06-14 RX ORDER — ASPIRIN 81 MG/1
81 TABLET ORAL DAILY
Status: DISCONTINUED | OUTPATIENT
Start: 2023-06-15 | End: 2023-06-14 | Stop reason: SDUPTHER

## 2023-06-14 RX ORDER — HYDROMORPHONE HCL 110MG/55ML
0.5 PATIENT CONTROLLED ANALGESIA SYRINGE INTRAVENOUS EVERY 5 MIN PRN
Status: DISCONTINUED | OUTPATIENT
Start: 2023-06-14 | End: 2023-06-15 | Stop reason: HOSPADM

## 2023-06-14 RX ORDER — CARVEDILOL 6.25 MG/1
6.25 TABLET ORAL 2 TIMES DAILY WITH MEALS
Status: DISCONTINUED | OUTPATIENT
Start: 2023-06-14 | End: 2023-06-15 | Stop reason: HOSPADM

## 2023-06-14 RX ORDER — DOCUSATE SODIUM 100 MG/1
100 CAPSULE, LIQUID FILLED ORAL DAILY PRN
Status: DISCONTINUED | OUTPATIENT
Start: 2023-06-14 | End: 2023-06-15 | Stop reason: HOSPADM

## 2023-06-14 RX ORDER — NIACIN 500 MG/1
500 TABLET, EXTENDED RELEASE ORAL NIGHTLY
Status: DISCONTINUED | OUTPATIENT
Start: 2023-06-14 | End: 2023-06-15 | Stop reason: HOSPADM

## 2023-06-14 RX ORDER — INSULIN LISPRO 100 [IU]/ML
0-4 INJECTION, SOLUTION INTRAVENOUS; SUBCUTANEOUS NIGHTLY
Status: DISCONTINUED | OUTPATIENT
Start: 2023-06-14 | End: 2023-06-15 | Stop reason: HOSPADM

## 2023-06-14 RX ORDER — ONDANSETRON 2 MG/ML
4 INJECTION INTRAMUSCULAR; INTRAVENOUS
Status: ACTIVE | OUTPATIENT
Start: 2023-06-14 | End: 2023-06-15

## 2023-06-14 RX ORDER — OXYCODONE HYDROCHLORIDE AND ACETAMINOPHEN 5; 325 MG/1; MG/1
2 TABLET ORAL EVERY 4 HOURS PRN
Status: DISCONTINUED | OUTPATIENT
Start: 2023-06-14 | End: 2023-06-15 | Stop reason: HOSPADM

## 2023-06-14 RX ORDER — SODIUM CHLORIDE 0.9 % (FLUSH) 0.9 %
5-40 SYRINGE (ML) INJECTION PRN
Status: DISCONTINUED | OUTPATIENT
Start: 2023-06-14 | End: 2023-06-14 | Stop reason: SDUPTHER

## 2023-06-14 RX ORDER — MEPERIDINE HYDROCHLORIDE 25 MG/ML
12.5 INJECTION INTRAMUSCULAR; INTRAVENOUS; SUBCUTANEOUS EVERY 5 MIN PRN
Status: DISCONTINUED | OUTPATIENT
Start: 2023-06-14 | End: 2023-06-15 | Stop reason: HOSPADM

## 2023-06-14 RX ORDER — DIPHENHYDRAMINE HYDROCHLORIDE 50 MG/ML
12.5 INJECTION INTRAMUSCULAR; INTRAVENOUS
Status: ACTIVE | OUTPATIENT
Start: 2023-06-14 | End: 2023-06-15

## 2023-06-14 RX ORDER — SODIUM CHLORIDE 9 MG/ML
INJECTION, SOLUTION INTRAVENOUS CONTINUOUS
Status: ACTIVE | OUTPATIENT
Start: 2023-06-14 | End: 2023-06-14

## 2023-06-14 RX ORDER — DEXTROSE MONOHYDRATE 100 MG/ML
INJECTION, SOLUTION INTRAVENOUS CONTINUOUS PRN
Status: DISCONTINUED | OUTPATIENT
Start: 2023-06-14 | End: 2023-06-15 | Stop reason: HOSPADM

## 2023-06-14 RX ORDER — INSULIN LISPRO 100 [IU]/ML
0-16 INJECTION, SOLUTION INTRAVENOUS; SUBCUTANEOUS
Status: DISCONTINUED | OUTPATIENT
Start: 2023-06-14 | End: 2023-06-15 | Stop reason: HOSPADM

## 2023-06-14 RX ORDER — SODIUM CHLORIDE 9 MG/ML
INJECTION, SOLUTION INTRAVENOUS PRN
Status: DISCONTINUED | OUTPATIENT
Start: 2023-06-14 | End: 2023-06-14 | Stop reason: SDUPTHER

## 2023-06-14 RX ORDER — CLOPIDOGREL 300 MG/1
600 TABLET, FILM COATED ORAL ONCE
Status: COMPLETED | OUTPATIENT
Start: 2023-06-14 | End: 2023-06-14

## 2023-06-14 RX ORDER — SODIUM CHLORIDE 9 MG/ML
INJECTION, SOLUTION INTRAVENOUS PRN
Status: DISCONTINUED | OUTPATIENT
Start: 2023-06-14 | End: 2023-06-15 | Stop reason: HOSPADM

## 2023-06-14 RX ORDER — EPTIFIBATIDE 0.75 MG/ML
2 INJECTION, SOLUTION INTRAVENOUS CONTINUOUS
Status: ACTIVE | OUTPATIENT
Start: 2023-06-14 | End: 2023-06-15

## 2023-06-14 RX ORDER — SODIUM CHLORIDE 0.9 % (FLUSH) 0.9 %
5-40 SYRINGE (ML) INJECTION EVERY 12 HOURS SCHEDULED
Status: DISCONTINUED | OUTPATIENT
Start: 2023-06-14 | End: 2023-06-14 | Stop reason: SDUPTHER

## 2023-06-14 RX ORDER — HYDROMORPHONE HCL 110MG/55ML
0.25 PATIENT CONTROLLED ANALGESIA SYRINGE INTRAVENOUS EVERY 5 MIN PRN
Status: DISCONTINUED | OUTPATIENT
Start: 2023-06-14 | End: 2023-06-15 | Stop reason: HOSPADM

## 2023-06-14 RX ORDER — METOPROLOL SUCCINATE 25 MG/1
25 TABLET, EXTENDED RELEASE ORAL DAILY
Status: DISCONTINUED | OUTPATIENT
Start: 2023-06-15 | End: 2023-06-14

## 2023-06-14 RX ORDER — POTASSIUM CHLORIDE 20 MEQ/1
20 TABLET, EXTENDED RELEASE ORAL DAILY
Status: DISCONTINUED | OUTPATIENT
Start: 2023-06-14 | End: 2023-06-15 | Stop reason: HOSPADM

## 2023-06-14 RX ORDER — ALBUTEROL SULFATE 2.5 MG/3ML
2.5 SOLUTION RESPIRATORY (INHALATION) 3 TIMES DAILY
Status: DISCONTINUED | OUTPATIENT
Start: 2023-06-14 | End: 2023-06-15 | Stop reason: HOSPADM

## 2023-06-14 RX ORDER — LOSARTAN POTASSIUM 25 MG/1
50 TABLET ORAL DAILY
Status: DISCONTINUED | OUTPATIENT
Start: 2023-06-14 | End: 2023-06-15 | Stop reason: HOSPADM

## 2023-06-14 RX ORDER — ASPIRIN 81 MG/1
81 TABLET, CHEWABLE ORAL DAILY
Status: DISCONTINUED | OUTPATIENT
Start: 2023-06-15 | End: 2023-06-15 | Stop reason: HOSPADM

## 2023-06-14 RX ORDER — OXYCODONE HYDROCHLORIDE AND ACETAMINOPHEN 5; 325 MG/1; MG/1
1 TABLET ORAL EVERY 4 HOURS PRN
Status: DISCONTINUED | OUTPATIENT
Start: 2023-06-14 | End: 2023-06-15 | Stop reason: HOSPADM

## 2023-06-14 RX ORDER — SODIUM CHLORIDE 0.9 % (FLUSH) 0.9 %
5-40 SYRINGE (ML) INJECTION PRN
Status: DISCONTINUED | OUTPATIENT
Start: 2023-06-14 | End: 2023-06-15 | Stop reason: HOSPADM

## 2023-06-14 RX ORDER — MONTELUKAST SODIUM 10 MG/1
10 TABLET ORAL NIGHTLY
Status: DISCONTINUED | OUTPATIENT
Start: 2023-06-14 | End: 2023-06-15 | Stop reason: HOSPADM

## 2023-06-14 RX ORDER — LANOLIN ALCOHOL/MO/W.PET/CERES
3 CREAM (GRAM) TOPICAL NIGHTLY PRN
Status: DISCONTINUED | OUTPATIENT
Start: 2023-06-14 | End: 2023-06-15 | Stop reason: HOSPADM

## 2023-06-14 RX ORDER — CLOPIDOGREL BISULFATE 75 MG/1
75 TABLET ORAL DAILY
Status: DISCONTINUED | OUTPATIENT
Start: 2023-06-15 | End: 2023-06-15 | Stop reason: HOSPADM

## 2023-06-14 RX ORDER — FERROUS SULFATE 325(65) MG
325 TABLET ORAL
Status: DISCONTINUED | OUTPATIENT
Start: 2023-06-15 | End: 2023-06-15 | Stop reason: HOSPADM

## 2023-06-14 RX ORDER — QUETIAPINE FUMARATE 100 MG/1
100 TABLET, FILM COATED ORAL DAILY
Status: DISCONTINUED | OUTPATIENT
Start: 2023-06-15 | End: 2023-06-15 | Stop reason: HOSPADM

## 2023-06-14 RX ORDER — ROSUVASTATIN CALCIUM 20 MG/1
40 TABLET, COATED ORAL NIGHTLY
Status: DISCONTINUED | OUTPATIENT
Start: 2023-06-14 | End: 2023-06-15 | Stop reason: HOSPADM

## 2023-06-14 RX ORDER — ALBUTEROL SULFATE 90 UG/1
2 AEROSOL, METERED RESPIRATORY (INHALATION) EVERY 6 HOURS PRN
Status: DISCONTINUED | OUTPATIENT
Start: 2023-06-14 | End: 2023-06-15 | Stop reason: HOSPADM

## 2023-06-14 RX ORDER — METOPROLOL SUCCINATE 25 MG/1
25 TABLET, EXTENDED RELEASE ORAL DAILY
Status: DISCONTINUED | OUTPATIENT
Start: 2023-06-14 | End: 2023-06-14

## 2023-06-14 RX ORDER — ARIPIPRAZOLE 5 MG/1
5 TABLET ORAL DAILY
Status: DISCONTINUED | OUTPATIENT
Start: 2023-06-15 | End: 2023-06-15 | Stop reason: HOSPADM

## 2023-06-14 RX ORDER — QUETIAPINE FUMARATE 200 MG/1
200 TABLET, FILM COATED ORAL NIGHTLY
Status: DISCONTINUED | OUTPATIENT
Start: 2023-06-14 | End: 2023-06-15 | Stop reason: HOSPADM

## 2023-06-14 RX ORDER — INSULIN GLARGINE 100 [IU]/ML
40 INJECTION, SOLUTION SUBCUTANEOUS NIGHTLY
Status: DISCONTINUED | OUTPATIENT
Start: 2023-06-14 | End: 2023-06-15 | Stop reason: HOSPADM

## 2023-06-14 RX ORDER — MORPHINE SULFATE 2 MG/ML
2 INJECTION, SOLUTION INTRAMUSCULAR; INTRAVENOUS
Status: DISCONTINUED | OUTPATIENT
Start: 2023-06-14 | End: 2023-06-15 | Stop reason: HOSPADM

## 2023-06-14 RX ORDER — ONDANSETRON 2 MG/ML
4 INJECTION INTRAMUSCULAR; INTRAVENOUS EVERY 6 HOURS PRN
Status: DISCONTINUED | OUTPATIENT
Start: 2023-06-14 | End: 2023-06-15 | Stop reason: HOSPADM

## 2023-06-14 RX ADMIN — SODIUM CHLORIDE: 9 INJECTION, SOLUTION INTRAVENOUS at 15:03

## 2023-06-14 RX ADMIN — MONTELUKAST 10 MG: 10 TABLET, FILM COATED ORAL at 20:17

## 2023-06-14 RX ADMIN — INSULIN LISPRO 12 UNITS: 100 INJECTION, SOLUTION INTRAVENOUS; SUBCUTANEOUS at 17:38

## 2023-06-14 RX ADMIN — QUETIAPINE FUMARATE 200 MG: 200 TABLET ORAL at 20:17

## 2023-06-14 RX ADMIN — CLOPIDOGREL BISULFATE 600 MG: 300 TABLET, FILM COATED ORAL at 15:17

## 2023-06-14 RX ADMIN — ALBUTEROL SULFATE 2.5 MG: 2.5 SOLUTION RESPIRATORY (INHALATION) at 20:35

## 2023-06-14 RX ADMIN — INSULIN LISPRO 4 UNITS: 100 INJECTION, SOLUTION INTRAVENOUS; SUBCUTANEOUS at 20:18

## 2023-06-14 RX ADMIN — EPTIFIBATIDE 2 MCG/KG/MIN: 0.75 INJECTION INTRAVENOUS at 15:08

## 2023-06-14 RX ADMIN — CARVEDILOL 6.25 MG: 6.25 TABLET, FILM COATED ORAL at 16:28

## 2023-06-14 RX ADMIN — MELATONIN TAB 3 MG 3 MG: 3 TAB at 23:51

## 2023-06-14 RX ADMIN — IOPAMIDOL 106 ML: 755 INJECTION, SOLUTION INTRAVENOUS at 14:09

## 2023-06-14 RX ADMIN — ROSUVASTATIN 40 MG: 20 TABLET, FILM COATED ORAL at 20:17

## 2023-06-14 RX ADMIN — LOSARTAN POTASSIUM 50 MG: 25 TABLET, FILM COATED ORAL at 16:28

## 2023-06-14 RX ADMIN — INSULIN GLARGINE 40 UNITS: 100 INJECTION, SOLUTION SUBCUTANEOUS at 20:17

## 2023-06-14 RX ADMIN — NIACIN 500 MG: 500 TABLET, EXTENDED RELEASE ORAL at 20:17

## 2023-06-14 RX ADMIN — ONDANSETRON 4 MG: 2 INJECTION INTRAMUSCULAR; INTRAVENOUS at 15:04

## 2023-06-14 RX ADMIN — POTASSIUM CHLORIDE 20 MEQ: 1500 TABLET, EXTENDED RELEASE ORAL at 15:16

## 2023-06-14 ASSESSMENT — PAIN SCALES - GENERAL
PAINLEVEL_OUTOF10: 2
PAINLEVEL_OUTOF10: 0

## 2023-06-14 NOTE — PROGRESS NOTES
06/14/23 1621   RT Protocol   History Pulmonary Disease 2   Respiratory pattern 0   Breath sounds 4   Cough 0   Bronchodilator Assessment Score 6

## 2023-06-14 NOTE — RT PROTOCOL NOTE
RT Inhaler-Nebulizer Bronchodilator Protocol Note    There is a bronchodilator order in the chart from a provider indicating to follow the RT Bronchodilator Protocol and there is an Initiate RT Inhaler-Nebulizer Bronchodilator Protocol order as well (see protocol at bottom of note). CXR Findings:  No results found. The findings from the last RT Protocol Assessment were as follows:   History Pulmonary Disease: Chronic pulmonary disease  Respiratory Pattern: Regular pattern and RR 12-20 bpm  Breath Sounds: Intermittent or unilateral wheezes  Cough: Strong, spontaneous, non-productive  Indication for Bronchodilator Therapy:    Bronchodilator Assessment Score: 6    Aerosolized bronchodilator medication orders have been revised according to the RT Inhaler-Nebulizer Bronchodilator Protocol below. Respiratory Therapist to perform RT Therapy Protocol Assessment initially then follow the protocol. Repeat RT Therapy Protocol Assessment PRN for score 0-3 or on second treatment, BID, and PRN for scores above 3. No Indications - adjust the frequency to every 6 hours PRN wheezing or bronchospasm, if no treatments needed after 48 hours then discontinue using Per Protocol order mode. If indication present, adjust the RT bronchodilator orders based on the Bronchodilator Assessment Score as indicated below. Use Inhaler orders unless patient has one or more of the following: on home nebulizer, not able to hold breath for 10 seconds, is not alert and oriented, cannot activate and use MDI correctly, or respiratory rate 25 breaths per minute or more, then use the equivalent nebulizer order(s) with same Frequency and PRN reasons based on the score. If a patient is on this medication at home then do not decrease Frequency below that used at home.     0-3 - enter or revise RT bronchodilator order(s) to equivalent RT Bronchodilator order with Frequency of every 4 hours PRN for wheezing or increased work of breathing using

## 2023-06-14 NOTE — CONSULTS
V2.0  [facility] Consult Note      Name:  Jose Luis Obando /Age/Sex: 1950  (81 y.o. female)   MRN & CSN:  [id number] & [id number] Encounter Date/Time: 2023 5:02 PM EDT   Location:  [place] [id number] PCP: MD Antelmo Haque MD  Consulting Provider: Tin Busch MD      Hospital Day: 1    Assessment and Recommendations   Jose Luis Obando is a 81 y.o. female with pmh of  who presents with CAD in native artery    Hospital Problems             Last Modified POA    * (Principal) CAD in native artery 2023 Yes       Recommendations:   CAD  - s/p [facility]  - Successful PCI to LM with 4.5x12 SONYA. PD with 5.0x12 NC to 18atm. 10% residual LM stenosis IVUS of LM post PCI shows MLA of 16.6mm2.  - continue  management per cardio  -Monitor renal function post cath    Uncontrolled HTN  - resume home meds   Add prn    Uncontrolled DM   BG is in 300  - add back home dose lantus  - SSI     Chronic CHF  Chronic COPD- duonebs,     Diet ADULT DIET; Regular; Low Fat/Low Chol/High Fiber/2 gm Na   DVT Prophylaxis [] Lovenox, []  Heparin, [] SCDs, [] Ambulation,  [] Eliquis, [] Xarelto   Code Status Full Code   Surrogate Decision Maker/ POA             History From:    History obtained from the patient. History of Present Illness:      Chief Complaint: [facility]     Jose Luis Obando is a 81 y.o. female who presents presented today to get left heart cath. Basal recently admitted for acute kidney injury placed at the time was not a candidate for left underwent left heart cath with stent placement. Postoperatively patient is status post stent currently denies any complaint history of diabetes hypertension COPD chronic CHF. Review of Systems:      Pertinent positives and negatives discussed in HPI    Objective:      Intake/Output Summary (Last 24 hours) at 2023 1702  Last data filed at 2023 1412  Gross per 24 hour   Intake 500 ml   Output --   Net 500 ml      Vitals:   Vitals:    23

## 2023-06-14 NOTE — PROGRESS NOTES
Patient brought over to CVU from cath lab and attached to CVU monitoring. Report reviewed with cath lab RN. RT radial soft and no hematoma or oozing noted. RT radial TR band in place and 2cc air will be removed every 15 minutes once patient is s/p intervention for 60 minutes.   Elvis Kendall, RN

## 2023-06-14 NOTE — PLAN OF CARE
Problem: Chronic Conditions and Co-morbidities  Goal: Patient's chronic conditions and co-morbidity symptoms are monitored and maintained or improved  6/14/2023 1835 by Lam Mares RN  Outcome: Progressing  6/14/2023 1834 by Lam Mares RN  Outcome: Progressing     Problem: Discharge Planning  Goal: Discharge to home or other facility with appropriate resources  Outcome: Progressing     Problem: Neurosensory - Adult  Goal: Achieves stable or improved neurological status  Outcome: Progressing     Problem: Respiratory - Adult  Goal: Achieves optimal ventilation and oxygenation  Outcome: Progressing     Problem: Cardiovascular - Adult  Goal: Maintains optimal cardiac output and hemodynamic stability  Outcome: Progressing     Problem: Skin/Tissue Integrity - Adult  Goal: Skin integrity remains intact  Outcome: Progressing     Problem: Musculoskeletal - Adult  Goal: Return mobility to safest level of function  Outcome: Progressing     Problem: Gastrointestinal - Adult  Goal: Minimal or absence of nausea and vomiting  Outcome: Progressing     Problem: Genitourinary - Adult  Goal: Absence of urinary retention  Outcome: Progressing     Problem: Infection - Adult  Goal: Absence of infection at discharge  Outcome: Progressing     Problem: Metabolic/Fluid and Electrolytes - Adult  Goal: Electrolytes maintained within normal limits  Outcome: Progressing     Problem: Hematologic - Adult  Goal: Maintains hematologic stability  Outcome: Progressing

## 2023-06-14 NOTE — PROGRESS NOTES
Spoke with Dr. Tuyet Quintero regarding need of insulin coverage and LT eye busted capillary. No symptoms with eyes. Patient A&O.  Magdi Deleon RN

## 2023-06-14 NOTE — PRE SEDATION
Brief Pre-Op Note/Sedation Assessment      Jose Luis Obando  1950  [id number]  10:24 AM    Planned Procedure: Cardiac Catheterization Procedure  Post Procedure Plan: Return to same level of care  Consent: I have discussed with the patient and/or the patient representative the indication, alternatives, and the possible risks and/or complications of the planned procedure and the anesthesia methods. The patient and/or patient representative appear to understand and agree to proceed. Chief Complaint:   Chest Pain/Pressure      Indications for Cath Procedure:  Presentation:  New Onset Angina <= 2 months, Worsening Angina, and Suspected CAD  2. Anginal Classification within 2 weeks:  CCS III - Symptoms with everyday living activities, i.e., moderate limitation  3. Angina Symptoms Assessment:  Atypical Chest Pain  4. Heart Failure Class within last 2 weeks:  No symptoms  5. Cardiovascular Instability:  No    Prior Ischemic Workup/Eval:  Pre-Procedural Medications: Yes: Aspirin, Beta Blockers, and STATIN  2. Stress Test Completed? No    Does Patient need surgery? Cath Valve Surgery:  No    Pre-Procedure Medical History:  Vital Signs:  /77   Pulse (!) 106   Resp 20   Ht 5' 1\" (1.549 m)   Wt 278 lb (126.1 kg)   BMI 52.53 kg/m²     Allergies:     Allergies   Allergen Reactions    Penicillins Swelling     Medications:    Current Facility-Administered Medications   Medication Dose Route Frequency Provider Last Rate Last Admin    sodium chloride flush 0.9 % injection 5-40 mL  5-40 mL IntraVENous PRN Tari Gonzales MD           Past Medical History:    Past Medical History:   Diagnosis Date    Asthma     Back pain     DJD    Bipolar 1 disorder ([facility]     CAD (coronary artery disease)     Cancer ([facility]     Left breast CA    Cataract     CHF (congestive heart failure) (HCC)     HFpEF    COPD (chronic obstructive pulmonary disease) (HCC)     Depression     Diabetes mellitus ([facility]     GERD

## 2023-06-14 NOTE — PROCEDURES
Patient:  Jaylene Alford   :   1950    Procedural Summary  ~Consent:   Obtained written and verbal consent      Risks/benefits explained in detail  ~Procedure:    Left Heart Catheterization  ~Medications:      ~Complications:   None  ~Blood Loss:    <10cc  ~Specimens:    None obtained  ~Pre-sedation re-evaluation: Performed immediately prior to procedure. Medication and Procedural Reconciliation:  An independent trained observer pushed medications at my direction. We monitored the patient's level of consciousness and vital signs/physiologic status throughout the procedure duration (see start and stop times below). Sedation start: 1319  Sedation stop: 1407    Cardiac Cath PCI:  Anatomy:   LM-distal 75%   LAD-mid 40%  Cx-prox 20%  OM- nml  RCA-dom prox 40%  RPDA- nml  LVEF- 55%  LVG- nml  LVEDP- 18    IVUS of LM shows MLA 5.6mm2    Intervention  ~Successful PCI to LM with 4.5x12 SONYA. PD with 5.0x12 NC to 18atm. 10% residual LM stenosis IVUS of LM post PCI shows MLA of 16.6mm2    Contrast: 106  Flouro Time: 7.9  Access: R radial a    Impression  ~Coronary Angiography w/ severe LM stenosis  ~LVG with LVEF of 55 and no regional wall motion abnormalities  ~Successful complex angioplasty and stenting of LM        Recommendation  ~Aggressive medical treatment and risk factor modification  ~1. Post cath IVF. Bedrest.   2. Recommend beta blocker, high potency statin, aspirin and plavix. No ace/arb required given normal LVEF. 3. Referral to outpatient cardiac rehab phase II will be deferred until patient follow-up in office and then determine patient safety and appropriateness to proceed  4. Patient has been advised on the importance of regular exercise of at least 20-30 minutes daily. 5. Patient counseled about and offered assistance for smoking cessation   6. Monitor overnight.  Follow up in 2 weeks with cardiology           Lexie Brenner MD, MD 2023 2:16 PM

## 2023-06-14 NOTE — H&P
[address]   Cardiac Evaluation      Patient: Amie Estrada  YOB: 1950         No chief complaint on file. Referring provider: Lamont Kennedy MD    History of Present Illness:   Amie Estrada is a 81 y.o. female presenting in hospital follow up to discuss [address] [place] [address]. She was hospitalized at the end of April with chest pain. She was not a candidate for [facility] at the time d/t renal impairment. Today she is here with her daughter. She is having shortness of breath, on home O2. Had chest pressure that brought her to the hospital. She had CP again in May and went to the ED. She was admitted for angina and awaited [facility] but due to scheduling difficulties with anesthesia it was not performed. She returns today for [address] [place] [address] with anesthesia. With regard to medication therapy he/she has been compliant with prescribed regimen and has tolerated therapy to date. Past Medical History:   has a past medical history of Asthma, Back pain, Bipolar 1 disorder ([address] CAD (coronary artery disease), Cancer ([address] Cataract, CHF (congestive heart failure) ([address] COPD (chronic obstructive pulmonary disease) ([address] Depression, Diabetes mellitus ([address] GERD (gastroesophageal reflux disease), Hx of blood clots, Hypercholesteremia, Hyperlipidemia, Hypertension, Morbid obesity with body mass index (BMI) greater than or equal to 50 ([address] Osteoporosis, Pancytopenia ([address] Right rib fracture, RSV bronchitis, Sciatica, and Tachycardia. Surgical History:   has a past surgical history that includes Cholecystectomy; Leg Surgery; Appendectomy; Endometrial ablation; Breast surgery (Left, 2021); Upper gastrointestinal endoscopy (N/A, 05/03/2023); back surgery; Colonoscopy; hernia repair; and eye surgery.      Current Facility-Administered Medications   Medication Dose Route Frequency Provider Last Rate Last Admin    sodium chloride flush 0.9 % injection 5-40 mL  5-40 mL IntraVENous PRN Barb Flynn MD

## 2023-06-15 VITALS
WEIGHT: 278 LBS | HEART RATE: 89 BPM | HEIGHT: 61 IN | SYSTOLIC BLOOD PRESSURE: 129 MMHG | BODY MASS INDEX: 52.49 KG/M2 | OXYGEN SATURATION: 95 % | DIASTOLIC BLOOD PRESSURE: 53 MMHG | RESPIRATION RATE: 16 BRPM | TEMPERATURE: 98 F

## 2023-06-15 LAB
ANION GAP SERPL CALCULATED.3IONS-SCNC: 13 MMOL/L (ref 3–16)
BUN SERPL-MCNC: 33 MG/DL (ref 7–20)
CALCIUM SERPL-MCNC: 9.1 MG/DL (ref 8.3–10.6)
CHLORIDE SERPL-SCNC: 99 MMOL/L (ref 99–110)
CO2 SERPL-SCNC: 23 MMOL/L (ref 21–32)
CREAT SERPL-MCNC: 0.9 MG/DL (ref 0.6–1.2)
DEPRECATED RDW RBC AUTO: 15.2 % (ref 12.4–15.4)
EST. AVERAGE GLUCOSE BLD GHB EST-MCNC: 145.6 MG/DL
GFR SERPLBLD CREATININE-BSD FMLA CKD-EPI: >60 ML/MIN/{1.73_M2}
GLUCOSE BLD-MCNC: 267 MG/DL (ref 70–99)
GLUCOSE BLD-MCNC: 280 MG/DL (ref 70–99)
GLUCOSE BLD-MCNC: 351 MG/DL (ref 70–99)
GLUCOSE BLD-MCNC: 400 MG/DL (ref 70–99)
GLUCOSE SERPL-MCNC: 348 MG/DL (ref 70–99)
HBA1C MFR BLD: 6.7 %
HCT VFR BLD AUTO: 33.1 % (ref 36–48)
HGB BLD-MCNC: 10.3 G/DL (ref 12–16)
MCH RBC QN AUTO: 30 PG (ref 26–34)
MCHC RBC AUTO-ENTMCNC: 31.2 G/DL (ref 31–36)
MCV RBC AUTO: 95.9 FL (ref 80–100)
PERFORMED ON: ABNORMAL
PLATELET # BLD AUTO: 188 K/UL (ref 135–450)
PMV BLD AUTO: 9.3 FL (ref 5–10.5)
POTASSIUM SERPL-SCNC: 5.1 MMOL/L (ref 3.5–5.1)
RBC # BLD AUTO: 3.45 M/UL (ref 4–5.2)
SODIUM SERPL-SCNC: 135 MMOL/L (ref 136–145)
WBC # BLD AUTO: 9.3 K/UL (ref 4–11)

## 2023-06-15 PROCEDURE — 80048 BASIC METABOLIC PNL TOTAL CA: CPT

## 2023-06-15 PROCEDURE — 6370000000 HC RX 637 (ALT 250 FOR IP): Performed by: HOSPITALIST

## 2023-06-15 PROCEDURE — 94640 AIRWAY INHALATION TREATMENT: CPT

## 2023-06-15 PROCEDURE — 2700000000 HC OXYGEN THERAPY PER DAY

## 2023-06-15 PROCEDURE — 85027 COMPLETE CBC AUTOMATED: CPT

## 2023-06-15 PROCEDURE — 6370000000 HC RX 637 (ALT 250 FOR IP): Performed by: INTERNAL MEDICINE

## 2023-06-15 PROCEDURE — 94761 N-INVAS EAR/PLS OXIMETRY MLT: CPT

## 2023-06-15 PROCEDURE — 6360000002 HC RX W HCPCS: Performed by: INTERNAL MEDICINE

## 2023-06-15 PROCEDURE — 99238 HOSP IP/OBS DSCHRG MGMT 30/<: CPT | Performed by: INTERNAL MEDICINE

## 2023-06-15 PROCEDURE — 1200000000 HC SEMI PRIVATE

## 2023-06-15 PROCEDURE — 2580000003 HC RX 258: Performed by: INTERNAL MEDICINE

## 2023-06-15 RX ORDER — ROSUVASTATIN CALCIUM 40 MG/1
40 TABLET, COATED ORAL NIGHTLY
Qty: 90 TABLET | Refills: 3 | Status: SHIPPED | OUTPATIENT
Start: 2023-06-15

## 2023-06-15 RX ORDER — CLOPIDOGREL BISULFATE 75 MG/1
75 TABLET ORAL DAILY
Qty: 90 TABLET | Refills: 3 | Status: SHIPPED | OUTPATIENT
Start: 2023-06-15

## 2023-06-15 RX ORDER — INSULIN LISPRO 100 [IU]/ML
10 INJECTION, SOLUTION INTRAVENOUS; SUBCUTANEOUS ONCE
Status: COMPLETED | OUTPATIENT
Start: 2023-06-15 | End: 2023-06-15

## 2023-06-15 RX ORDER — LOSARTAN POTASSIUM 50 MG/1
50 TABLET ORAL DAILY
Qty: 90 TABLET | Refills: 3 | Status: SHIPPED | OUTPATIENT
Start: 2023-06-15

## 2023-06-15 RX ORDER — CARVEDILOL 6.25 MG/1
6.25 TABLET ORAL 2 TIMES DAILY WITH MEALS
Qty: 180 TABLET | Refills: 3 | Status: SHIPPED | OUTPATIENT
Start: 2023-06-15

## 2023-06-15 RX ADMIN — INSULIN LISPRO 8 UNITS: 100 INJECTION, SOLUTION INTRAVENOUS; SUBCUTANEOUS at 15:12

## 2023-06-15 RX ADMIN — EMPAGLIFLOZIN 10 MG: 10 TABLET, FILM COATED ORAL at 08:21

## 2023-06-15 RX ADMIN — TIOTROPIUM BROMIDE INHALATION SPRAY 2 PUFF: 3.12 SPRAY, METERED RESPIRATORY (INHALATION) at 08:55

## 2023-06-15 RX ADMIN — CLOPIDOGREL BISULFATE 75 MG: 75 TABLET ORAL at 08:21

## 2023-06-15 RX ADMIN — ASPIRIN 81 MG: 81 TABLET, CHEWABLE ORAL at 08:21

## 2023-06-15 RX ADMIN — FUROSEMIDE 40 MG: 40 TABLET ORAL at 08:21

## 2023-06-15 RX ADMIN — INSULIN LISPRO 8 UNITS: 100 INJECTION, SOLUTION INTRAVENOUS; SUBCUTANEOUS at 08:21

## 2023-06-15 RX ADMIN — SODIUM CHLORIDE, PRESERVATIVE FREE 10 ML: 5 INJECTION INTRAVENOUS at 08:25

## 2023-06-15 RX ADMIN — INSULIN LISPRO 16 UNITS: 100 INJECTION, SOLUTION INTRAVENOUS; SUBCUTANEOUS at 12:01

## 2023-06-15 RX ADMIN — ALBUTEROL SULFATE 2.5 MG: 2.5 SOLUTION RESPIRATORY (INHALATION) at 08:52

## 2023-06-15 RX ADMIN — INSULIN LISPRO 10 UNITS: 100 INJECTION, SOLUTION INTRAVENOUS; SUBCUTANEOUS at 13:27

## 2023-06-15 RX ADMIN — FERROUS SULFATE TAB 325 MG (65 MG ELEMENTAL FE) 325 MG: 325 (65 FE) TAB at 08:20

## 2023-06-15 RX ADMIN — POTASSIUM CHLORIDE 20 MEQ: 1500 TABLET, EXTENDED RELEASE ORAL at 08:20

## 2023-06-15 RX ADMIN — ONDANSETRON 4 MG: 2 INJECTION INTRAMUSCULAR; INTRAVENOUS at 02:04

## 2023-06-15 RX ADMIN — CARVEDILOL 6.25 MG: 6.25 TABLET, FILM COATED ORAL at 08:20

## 2023-06-15 RX ADMIN — LOSARTAN POTASSIUM 50 MG: 25 TABLET, FILM COATED ORAL at 08:20

## 2023-06-15 RX ADMIN — QUETIAPINE FUMARATE 100 MG: 100 TABLET ORAL at 08:20

## 2023-06-15 RX ADMIN — ALBUTEROL SULFATE 2.5 MG: 2.5 SOLUTION RESPIRATORY (INHALATION) at 14:02

## 2023-06-15 RX ADMIN — ARIPIPRAZOLE 5 MG: 5 TABLET ORAL at 08:20

## 2023-06-15 ASSESSMENT — PAIN SCALES - GENERAL
PAINLEVEL_OUTOF10: 0

## 2023-06-15 NOTE — CARE COORDINATION
Patient discharged 1/15/2023 to home   All discharge needs met per case management     Pal Harrell RN, BSN  [phone]

## 2023-06-15 NOTE — CARE COORDINATION
06/15/23 1339   IMM Letter   IMM Letter given to Patient/Family/Significant other/Guardian/POA/by: Tu Cardoza RN CM   IMM Letter date given: 06/15/23   IMM Letter time given: 6050  (copy made for hard chart)

## 2023-06-15 NOTE — PROGRESS NOTES
CLINICAL PHARMACY NOTE: MEDS TO BEDS    Total # of Prescriptions Filled: 4   The following medications were delivered to the patient:  Carvedilol 6.25 mg  Plavix 75 mg  Rosuvastatin 40 mg  Losartan 50 mg    Additional Documentation:  Delivered to patient=signed  Ok to be delivered per Mary Vazquez [facility]

## 2023-06-15 NOTE — PROGRESS NOTES
NEURO: A&O x4     CARDIAC: s/p PCI to LM. Pt , BP stable 127/65 (84)      RESP: 96% on 2L O2 (wears home O2 prior to admit)     GI: Active BS, good appetite     SKIN: R radial puncture nicole from PCI. No bleeding, bruising, hematoma, oozing or swelling noted     : Good UO     LINES: PIV     GTTS:MIV @ 50/hr  Redness noted to L eye post CCL, stable at this time, will monitor for changes. Pt stable on start of shift. Blood glucose 334, insulin given,. All questions answered. See flowsheets for details, VS, MAR for meds and titration.

## 2023-06-15 NOTE — PLAN OF CARE
Problem: Chronic Conditions and Co-morbidities  Goal: Patient's chronic conditions and co-morbidity symptoms are monitored and maintained or improved  Outcome: Completed     Problem: Discharge Planning  Goal: Discharge to home or other facility with appropriate resources  Outcome: Completed     Problem: Neurosensory - Adult  Goal: Achieves stable or improved neurological status  Outcome: Completed     Problem: Respiratory - Adult  Goal: Achieves optimal ventilation and oxygenation  Outcome: Completed     Problem: Cardiovascular - Adult  Goal: Maintains optimal cardiac output and hemodynamic stability  Outcome: Completed     Problem: Skin/Tissue Integrity - Adult  Goal: Skin integrity remains intact  Outcome: Completed     Problem: Musculoskeletal - Adult  Goal: Return mobility to safest level of function  Outcome: Completed     Problem: Gastrointestinal - Adult  Goal: Minimal or absence of nausea and vomiting  Outcome: Completed     Problem: Genitourinary - Adult  Goal: Absence of urinary retention  Outcome: Completed     Problem: Infection - Adult  Goal: Absence of infection at discharge  Outcome: Completed     Problem: Metabolic/Fluid and Electrolytes - Adult  Goal: Electrolytes maintained within normal limits  Outcome: Completed     Problem: Hematologic - Adult  Goal: Maintains hematologic stability  Outcome: Completed     Problem: ABCDS Injury Assessment  Goal: Absence of physical injury  Outcome: Completed     Problem: Safety - Adult  Goal: Free from fall injury  Outcome: Completed     Problem: Pain  Goal: Verbalizes/displays adequate comfort level or baseline comfort level  Outcome: Completed

## 2023-06-15 NOTE — PROGRESS NOTES
Pt discharged to home with friend. Discharge instructions explained. Questions answered. Transported via wheelchair to personal vehicle with all personal belongings.

## 2023-06-15 NOTE — DISCHARGE SUMMARY
[address] [place] [address] SUMMARY      Patient ID:  Tu Knight  [id number] 19 y.o. 1950    Admit date: 6/14/2023    Discharge date:  06/15/23     Admitting Physician: Antoinette Gray MD     Discharge Physician: Antoinette Gray MD     Admission Diagnoses: Chest pain, unspecified [R07.9]  Atherosclerotic heart disease of native coronary artery without angina pectoris [I25.10]  Shortness of breath [R06.02]  CAD in native artery [I25.10]    Discharge Diagnoses:   Patient Active Problem List   Diagnosis    CHF (congestive heart failure), NYHA class I, acute on chronic, combined ([facility] [address]    Acute on chronic congestive heart failure ([facility]    Acute on chronic respiratory failure with hypoxia (HCC)    Pancytopenia (HCC)    Iron deficiency anemia    Obesity    Primary hypertension    Chest pain    Mixed hyperlipidemia    Pre-syncope    Palpitations    Dyspnea    CAD in native artery    Diabetes mellitus type 2, uncomplicated, on long term insulin pump [facility])        Discharged Condition: good    Hospital Course: Tu Knight was admitted for overnight observation following [facility] with PCI. PCI to Left main, IVUS guided. She was intubated for procedure, general anesthesia. Tolerated procedure well and had no issues overnight. BP elevated and started on bp meds. Prescribed plavix, coreg, crestor and losartan.      Physical Examination:    /64   Pulse 99   Temp 97.6 °F (36.4 °C) (Temporal)   Resp 16   Ht 5' 1\" (1.549 m)   Wt 278 lb (126.1 kg)   SpO2 95%   BMI 52.53 kg/m²      General Appearance:    Alert, cooperative, no distress, appears stated age   Head:    Normocephalic, without obvious abnormality, atraumatic   Eyes:    PERRL, conjunctiva/corneas clear, EOM's intact       Ears:    Normal external ear canals, both ears   Nose:   Nares normal, septum midline, no drainage       Throat:   Lips, mucosa, and tongue normal;    Neck:   Supple, symmetrical, trachea midline, no adenopathy;        thyroid:

## 2023-06-15 NOTE — PLAN OF CARE
Problem: Chronic Conditions and Co-morbidities  Goal: Patient's chronic conditions and co-morbidity symptoms are monitored and maintained or improved  6/14/2023 2026 by Valentino Redman RN  Outcome: Progressing  Flowsheets (Taken 6/14/2023 2026)  Care Plan - Patient's Chronic Conditions and Co-Morbidity Symptoms are Monitored and Maintained or Improved:   Monitor and assess patient's chronic conditions and comorbid symptoms for stability, deterioration, or improvement   Collaborate with multidisciplinary team to address chronic and comorbid conditions and prevent exacerbation or deterioration  6/14/2023 1835 by Opal Severin, RN  Outcome: Progressing  6/14/2023 1834 by Opal Severin, RN  Outcome: Progressing     Problem: Respiratory - Adult  Goal: Achieves optimal ventilation and oxygenation  6/14/2023 2026 by Valentino Redman RN  Outcome: Progressing  Flowsheets (Taken 6/14/2023 2026)  Achieves optimal ventilation and oxygenation:   Assess for changes in respiratory status   Position to facilitate oxygenation and minimize respiratory effort   Assess for changes in mentation and behavior  6/14/2023 1835 by Opal Severin, RN  Outcome: Progressing     Problem: Cardiovascular - Adult  Goal: Maintains optimal cardiac output and hemodynamic stability  6/14/2023 2026 by Valentino Redman RN  Outcome: Progressing  Flowsheets (Taken 6/14/2023 2026)  Maintains optimal cardiac output and hemodynamic stability:   Monitor blood pressure and heart rate   Monitor urine output and notify Licensed Independent Practitioner for values outside of normal range  6/14/2023 1835 by Opal Severin, RN  Outcome: Progressing

## 2023-06-15 NOTE — PROGRESS NOTES
Pt complaining of nausea, stating it is from \"the shot they gave me before I went to sleep\". PRN Zofran given, will monitor for improvement.

## 2023-06-16 LAB
EKG ATRIAL RATE: 101 BPM
EKG ATRIAL RATE: 106 BPM
EKG DIAGNOSIS: NORMAL
EKG DIAGNOSIS: NORMAL
EKG P AXIS: 55 DEGREES
EKG P AXIS: 56 DEGREES
EKG P-R INTERVAL: 136 MS
EKG P-R INTERVAL: 154 MS
EKG Q-T INTERVAL: 322 MS
EKG Q-T INTERVAL: 346 MS
EKG QRS DURATION: 74 MS
EKG QRS DURATION: 82 MS
EKG QTC CALCULATION (BAZETT): 427 MS
EKG QTC CALCULATION (BAZETT): 448 MS
EKG R AXIS: 1 DEGREES
EKG R AXIS: 4 DEGREES
EKG T AXIS: 107 DEGREES
EKG T AXIS: 99 DEGREES
EKG VENTRICULAR RATE: 101 BPM
EKG VENTRICULAR RATE: 106 BPM

## 2023-06-16 PROCEDURE — 93010 ELECTROCARDIOGRAM REPORT: CPT | Performed by: INTERNAL MEDICINE

## 2023-06-28 ENCOUNTER — OFFICE VISIT (OUTPATIENT)
Dept: CARDIOLOGY CLINIC | Age: 73
End: 2023-06-28
Payer: MEDICARE

## 2023-06-28 VITALS
HEIGHT: 61 IN | DIASTOLIC BLOOD PRESSURE: 60 MMHG | BODY MASS INDEX: 52.11 KG/M2 | WEIGHT: 276 LBS | SYSTOLIC BLOOD PRESSURE: 98 MMHG | OXYGEN SATURATION: 94 % | HEART RATE: 94 BPM

## 2023-06-28 DIAGNOSIS — E66.01 MORBID OBESITY WITH BMI OF 50.0-59.9, ADULT (HCC): ICD-10-CM

## 2023-06-28 DIAGNOSIS — I10 PRIMARY HYPERTENSION: ICD-10-CM

## 2023-06-28 DIAGNOSIS — E08.65 DIABETES MELLITUS DUE TO UNDERLYING CONDITION, UNCONTROLLED, WITH HYPERGLYCEMIA (HCC): ICD-10-CM

## 2023-06-28 DIAGNOSIS — I25.84 CORONARY ARTERY DISEASE DUE TO CALCIFIED CORONARY LESION: ICD-10-CM

## 2023-06-28 DIAGNOSIS — I50.32 CHRONIC DIASTOLIC HEART FAILURE (HCC): Primary | ICD-10-CM

## 2023-06-28 DIAGNOSIS — G47.33 OSA (OBSTRUCTIVE SLEEP APNEA): ICD-10-CM

## 2023-06-28 DIAGNOSIS — I25.10 CORONARY ARTERY DISEASE DUE TO CALCIFIED CORONARY LESION: ICD-10-CM

## 2023-06-28 PROCEDURE — 3017F COLORECTAL CA SCREEN DOC REV: CPT | Performed by: CLINICAL NURSE SPECIALIST

## 2023-06-28 PROCEDURE — 3078F DIAST BP <80 MM HG: CPT | Performed by: CLINICAL NURSE SPECIALIST

## 2023-06-28 PROCEDURE — 1123F ACP DISCUSS/DSCN MKR DOCD: CPT | Performed by: CLINICAL NURSE SPECIALIST

## 2023-06-28 PROCEDURE — 1036F TOBACCO NON-USER: CPT | Performed by: CLINICAL NURSE SPECIALIST

## 2023-06-28 PROCEDURE — 1090F PRES/ABSN URINE INCON ASSESS: CPT | Performed by: CLINICAL NURSE SPECIALIST

## 2023-06-28 PROCEDURE — G8400 PT W/DXA NO RESULTS DOC: HCPCS | Performed by: CLINICAL NURSE SPECIALIST

## 2023-06-28 PROCEDURE — G8417 CALC BMI ABV UP PARAM F/U: HCPCS | Performed by: CLINICAL NURSE SPECIALIST

## 2023-06-28 PROCEDURE — 3074F SYST BP LT 130 MM HG: CPT | Performed by: CLINICAL NURSE SPECIALIST

## 2023-06-28 PROCEDURE — 1111F DSCHRG MED/CURRENT MED MERGE: CPT | Performed by: CLINICAL NURSE SPECIALIST

## 2023-06-28 PROCEDURE — G8427 DOCREV CUR MEDS BY ELIG CLIN: HCPCS | Performed by: CLINICAL NURSE SPECIALIST

## 2023-06-28 PROCEDURE — 99214 OFFICE O/P EST MOD 30 MIN: CPT | Performed by: CLINICAL NURSE SPECIALIST

## 2023-07-31 NOTE — DISCHARGE SUMMARY
mcg by mouth daily      TRULICITY 1.5 CF/7.4VW SC injection       LEVEMIR FLEXTOUCH 100 UNIT/ML injection pen       tiotropium (SPIRIVA RESPIMAT) 2.5 MCG/ACT AERS inhaler Inhale 2 puffs into the lungs daily  Qty: 4 g, Refills: 4      lisinopril (PRINIVIL;ZESTRIL) 10 MG tablet Take 30 mg by mouth daily      niacin 500 MG CR capsule Take 500 mg by mouth nightly. simvastatin (ZOCOR) 20 MG tablet Take 20 mg by mouth nightly. ARIPiprazole (ABILIFY) 10 MG tablet Take 10 mg by mouth daily. montelukast (SINGULAIR) 10 MG tablet Take 10 mg by mouth nightly. !! QUEtiapine (SEROQUEL) 100 MG tablet Take 100 mg by mouth daily. albuterol (PROVENTIL HFA;VENTOLIN HFA) 108 (90 BASE) MCG/ACT inhaler Inhale 2 puffs into the lungs every 6 hours as needed. !! QUEtiapine (SEROQUEL) 200 MG tablet Take 200 mg by mouth at bedtime      atenolol (TENORMIN) 50 MG tablet Take 50 mg by mouth See Admin Instructions. 1/2 TAB DAILY        ! ! - Potential duplicate medications found. Please discuss with provider. Time Spent on discharge is more than 45 minutes in the examination, evaluation, counseling and review of medications and discharge plan. Signed:    Joy Olmedo MD   3/27/2023    Thank you Trev Escamilla MD for the opportunity to be involved in this patient's care.
Oriented - self; Oriented - place; Oriented - time
Wheelchair/Stroller

## 2023-08-08 ENCOUNTER — OFFICE VISIT (OUTPATIENT)
Dept: CARDIOLOGY CLINIC | Age: 73
End: 2023-08-08
Payer: MEDICARE

## 2023-08-08 ENCOUNTER — HOSPITAL ENCOUNTER (OUTPATIENT)
Age: 73
Discharge: HOME | End: 2023-08-08
Payer: MEDICARE

## 2023-08-08 VITALS
DIASTOLIC BLOOD PRESSURE: 60 MMHG | HEART RATE: 93 BPM | BODY MASS INDEX: 51.35 KG/M2 | SYSTOLIC BLOOD PRESSURE: 100 MMHG | HEIGHT: 61 IN | WEIGHT: 272 LBS | OXYGEN SATURATION: 93 %

## 2023-08-08 DIAGNOSIS — I25.84 CORONARY ARTERY DISEASE DUE TO CALCIFIED CORONARY LESION: ICD-10-CM

## 2023-08-08 DIAGNOSIS — I10 PRIMARY HYPERTENSION: ICD-10-CM

## 2023-08-08 DIAGNOSIS — G47.33 OSA (OBSTRUCTIVE SLEEP APNEA): ICD-10-CM

## 2023-08-08 DIAGNOSIS — E66.01 MORBID OBESITY WITH BMI OF 50.0-59.9, ADULT (HCC): ICD-10-CM

## 2023-08-08 DIAGNOSIS — I50.32 CHRONIC DIASTOLIC HEART FAILURE (HCC): Primary | ICD-10-CM

## 2023-08-08 DIAGNOSIS — I25.10 CORONARY ARTERY DISEASE DUE TO CALCIFIED CORONARY LESION: ICD-10-CM

## 2023-08-08 DIAGNOSIS — I50.32 CHRONIC DIASTOLIC HEART FAILURE (HCC): ICD-10-CM

## 2023-08-08 LAB
DEPRECATED RDW RBC AUTO: 16.1 % (ref 12.4–15.4)
HCT VFR BLD AUTO: 29.5 % (ref 36–48)
HGB BLD-MCNC: 9.2 G/DL (ref 12–16)
MCH RBC QN AUTO: 27.7 PG (ref 26–34)
MCHC RBC AUTO-ENTMCNC: 31.3 G/DL (ref 31–36)
MCV RBC AUTO: 88.5 FL (ref 80–100)
PLATELET # BLD AUTO: 167 K/UL (ref 135–450)
PMV BLD AUTO: 10.2 FL (ref 5–10.5)
RBC # BLD AUTO: 3.33 M/UL (ref 4–5.2)
WBC # BLD AUTO: 6.2 K/UL (ref 4–11)

## 2023-08-08 PROCEDURE — 99214 OFFICE O/P EST MOD 30 MIN: CPT | Performed by: CLINICAL NURSE SPECIALIST

## 2023-08-08 PROCEDURE — 3074F SYST BP LT 130 MM HG: CPT | Performed by: CLINICAL NURSE SPECIALIST

## 2023-08-08 PROCEDURE — 1123F ACP DISCUSS/DSCN MKR DOCD: CPT | Performed by: CLINICAL NURSE SPECIALIST

## 2023-08-08 PROCEDURE — 1036F TOBACCO NON-USER: CPT | Performed by: CLINICAL NURSE SPECIALIST

## 2023-08-08 PROCEDURE — 1090F PRES/ABSN URINE INCON ASSESS: CPT | Performed by: CLINICAL NURSE SPECIALIST

## 2023-08-08 PROCEDURE — 85027 COMPLETE CBC AUTOMATED: CPT

## 2023-08-08 PROCEDURE — 36415 COLL VENOUS BLD VENIPUNCTURE: CPT

## 2023-08-08 PROCEDURE — 80048 BASIC METABOLIC PNL TOTAL CA: CPT

## 2023-08-08 PROCEDURE — G8400 PT W/DXA NO RESULTS DOC: HCPCS | Performed by: CLINICAL NURSE SPECIALIST

## 2023-08-08 PROCEDURE — 3017F COLORECTAL CA SCREEN DOC REV: CPT | Performed by: CLINICAL NURSE SPECIALIST

## 2023-08-08 PROCEDURE — 3078F DIAST BP <80 MM HG: CPT | Performed by: CLINICAL NURSE SPECIALIST

## 2023-08-08 PROCEDURE — G8427 DOCREV CUR MEDS BY ELIG CLIN: HCPCS | Performed by: CLINICAL NURSE SPECIALIST

## 2023-08-08 PROCEDURE — 83880 ASSAY OF NATRIURETIC PEPTIDE: CPT

## 2023-08-08 PROCEDURE — G8417 CALC BMI ABV UP PARAM F/U: HCPCS | Performed by: CLINICAL NURSE SPECIALIST

## 2023-08-08 NOTE — PATIENT INSTRUCTIONS
Check blood work   Consider about the colonoscopy with Dr Deyvi Waller  Need to consider sleep study speak Dr Gabby Amos all current medications  RTO in 3 months  Call PCP about your blood sugar control

## 2023-08-08 NOTE — PROGRESS NOTES
gastrointestinal endoscopy (N/A, 05/03/2023); back surgery; Colonoscopy; hernia repair; eye surgery; and Carotid stent (06/14/2023). Social History:   reports that she has never smoked. She has never used smokeless tobacco. She reports that she does not drink alcohol and does not use drugs. Family History:   No family history on file. Home Medications:  Prior to Admission medications    Medication Sig Start Date End Date Taking? Authorizing Provider   rosuvastatin (CRESTOR) 40 MG tablet Take 1 tablet by mouth nightly  Patient taking differently: Take 0.5 tablets by mouth nightly 6/15/23  Yes Matilde Britton MD   losartan (COZAAR) 50 MG tablet Take 1 tablet by mouth daily 6/15/23  Yes Matilde Britton MD   carvedilol (COREG) 6.25 MG tablet Take 1 tablet by mouth 2 times daily (with meals) 6/15/23  Yes Matilde Britton MD   clopidogrel (PLAVIX) 75 MG tablet Take 1 tablet by mouth daily 6/15/23  Yes Matilde Britton MD   docusate sodium (COLACE) 100 MG capsule Take 1 capsule by mouth daily as needed for Constipation   Yes Historical Provider, MD   OXYGEN Inhale 2 L into the lungs continuous   Yes Historical Provider, MD   furosemide (LASIX) 40 MG tablet Take 1.5 tablets by mouth daily 5/3/23  Yes Daniel Hansen MD   aspirin 81 MG chewable tablet Take 1 tablet by mouth daily 5/3/23  Yes Daniel Hansen MD   empagliflozin (JARDIANCE) 10 MG tablet Take 1 tablet by mouth daily 3/30/23  Yes Koki Ward, APRN - CNS   vitamin B-12 (CYANOCOBALAMIN) 100 MCG tablet Take 10 tablets by mouth Once a week on Wednesdays   Yes Historical Provider, MD   TRULICITY 1.5 WJ/8.0EJ SC injection 0.5 mLs once a week Every Thursday.  1/13/23  Yes Historical Provider, MD   LEVEMIR FLEXTOUCH 100 UNIT/ML injection pen Pt takes 40 units morning and at night 2/9/23  Yes Historical Provider, MD   tiotropium (SPIRIVA RESPIMAT) 2.5 MCG/ACT AERS inhaler Inhale 2 puffs into the lungs daily 3/14/23  Yes Tamera Woods MD   niacin 500 MG CR capsule Take

## 2023-08-09 ENCOUNTER — TELEPHONE (OUTPATIENT)
Dept: CARDIOLOGY CLINIC | Age: 73
End: 2023-08-09

## 2023-08-09 DIAGNOSIS — D50.8 OTHER IRON DEFICIENCY ANEMIA: Primary | ICD-10-CM

## 2023-08-09 LAB
ANION GAP SERPL CALCULATED.3IONS-SCNC: 12 MMOL/L (ref 3–16)
BUN SERPL-MCNC: 9 MG/DL (ref 7–20)
CALCIUM SERPL-MCNC: 9.8 MG/DL (ref 8.3–10.6)
CHLORIDE SERPL-SCNC: 96 MMOL/L (ref 99–110)
CO2 SERPL-SCNC: 31 MMOL/L (ref 21–32)
CREAT SERPL-MCNC: 0.9 MG/DL (ref 0.6–1.2)
GFR SERPLBLD CREATININE-BSD FMLA CKD-EPI: >60 ML/MIN/{1.73_M2}
GLUCOSE SERPL-MCNC: 291 MG/DL (ref 70–99)
NT-PROBNP SERPL-MCNC: <36 PG/ML (ref 0–124)
POTASSIUM SERPL-SCNC: 4 MMOL/L (ref 3.5–5.1)
SODIUM SERPL-SCNC: 139 MMOL/L (ref 136–145)

## 2023-08-09 NOTE — TELEPHONE ENCOUNTER
----- Message from SONDRA Pena - CNS sent at 8/9/2023  8:52 AM EDT -----  Her hemoglobin is down some more 9  She needs to call Dr Venus Grace and schedule the colonoscopy  Call the lab and add on the iron studies  Her blood sugar was 291 make sure she speaks to her PCP about this  Thanks      Called the lab they will add the iron studies. Tried to reach patient [facility] for her to return our call.   Spoke to patient she will call Dr. Venus Grace about the colonoscopy

## 2023-08-10 DIAGNOSIS — D50.8 OTHER IRON DEFICIENCY ANEMIA: ICD-10-CM

## 2023-08-10 LAB
FERRITIN SERPL IA-MCNC: 18 NG/ML (ref 15–150)
IRON SATN MFR SERPL: 8 % (ref 15–50)
IRON SERPL-MCNC: 35 UG/DL (ref 37–145)
TIBC SERPL-MCNC: 419 UG/DL (ref 260–445)

## 2023-08-11 ENCOUNTER — CLINICAL DOCUMENTATION (OUTPATIENT)
Dept: ONCOLOGY | Age: 73
End: 2023-08-11

## 2023-08-11 ENCOUNTER — OFFICE VISIT (OUTPATIENT)
Dept: PULMONOLOGY | Age: 73
End: 2023-08-11
Payer: MEDICARE

## 2023-08-11 ENCOUNTER — TELEPHONE (OUTPATIENT)
Dept: CARDIOLOGY CLINIC | Age: 73
End: 2023-08-11

## 2023-08-11 VITALS
OXYGEN SATURATION: 91 % | DIASTOLIC BLOOD PRESSURE: 54 MMHG | WEIGHT: 271 LBS | SYSTOLIC BLOOD PRESSURE: 106 MMHG | HEIGHT: 61 IN | HEART RATE: 93 BPM | BODY MASS INDEX: 51.16 KG/M2

## 2023-08-11 DIAGNOSIS — G47.33 OSA (OBSTRUCTIVE SLEEP APNEA): ICD-10-CM

## 2023-08-11 DIAGNOSIS — I10 PRIMARY HYPERTENSION: ICD-10-CM

## 2023-08-11 DIAGNOSIS — J45.20 MILD INTERMITTENT ASTHMA WITHOUT COMPLICATION: Primary | ICD-10-CM

## 2023-08-11 DIAGNOSIS — D64.9 ANEMIA, UNSPECIFIED TYPE: ICD-10-CM

## 2023-08-11 DIAGNOSIS — E66.01 CLASS 3 SEVERE OBESITY DUE TO EXCESS CALORIES WITH SERIOUS COMORBIDITY AND BODY MASS INDEX (BMI) OF 50.0 TO 59.9 IN ADULT (HCC): ICD-10-CM

## 2023-08-11 PROCEDURE — 1123F ACP DISCUSS/DSCN MKR DOCD: CPT | Performed by: INTERNAL MEDICINE

## 2023-08-11 PROCEDURE — G8427 DOCREV CUR MEDS BY ELIG CLIN: HCPCS | Performed by: INTERNAL MEDICINE

## 2023-08-11 PROCEDURE — 3074F SYST BP LT 130 MM HG: CPT | Performed by: INTERNAL MEDICINE

## 2023-08-11 PROCEDURE — 3017F COLORECTAL CA SCREEN DOC REV: CPT | Performed by: INTERNAL MEDICINE

## 2023-08-11 PROCEDURE — G8400 PT W/DXA NO RESULTS DOC: HCPCS | Performed by: INTERNAL MEDICINE

## 2023-08-11 PROCEDURE — 3078F DIAST BP <80 MM HG: CPT | Performed by: INTERNAL MEDICINE

## 2023-08-11 PROCEDURE — 1090F PRES/ABSN URINE INCON ASSESS: CPT | Performed by: INTERNAL MEDICINE

## 2023-08-11 PROCEDURE — 99214 OFFICE O/P EST MOD 30 MIN: CPT | Performed by: INTERNAL MEDICINE

## 2023-08-11 PROCEDURE — G8417 CALC BMI ABV UP PARAM F/U: HCPCS | Performed by: INTERNAL MEDICINE

## 2023-08-11 PROCEDURE — 1036F TOBACCO NON-USER: CPT | Performed by: INTERNAL MEDICINE

## 2023-08-11 RX ORDER — SODIUM CHLORIDE 9 MG/ML
5-250 INJECTION, SOLUTION INTRAVENOUS ONCE
OUTPATIENT
Start: 2023-08-11 | End: 2023-08-11

## 2023-08-11 RX ORDER — TRAZODONE HYDROCHLORIDE 50 MG/1
50 TABLET ORAL NIGHTLY
Qty: 2 TABLET | Refills: 0 | Status: SHIPPED | OUTPATIENT
Start: 2023-08-11 | End: 2023-08-13

## 2023-08-11 RX ORDER — SODIUM CHLORIDE 0.9 % (FLUSH) 0.9 %
5-40 SYRINGE (ML) INJECTION ONCE
OUTPATIENT
Start: 2023-08-11 | End: 2023-08-11

## 2023-08-11 NOTE — PROGRESS NOTES
Valve is mildly calcified. No hemodynamically significant valvular aortic stenosis. Borderline dilated ascending aorta. Right ventricular systolic pressure is normal at <35 mmHg. The aortic valve is trileaflet. Left atrial filling pressure is elevated based on E/E >15. A contrast agent was used to demonstrate all left ventricular wall segments. IMPRESSION AND RECOMMENDATIONS:     1. Mild intermittent asthma without complication  -Patient has been diagnosed to have asthma since her childhood.  -She also smoked for 20 years in the past.  -She may have developed a COPD-asthma overlap.  -Complete PFT was personally reviewed by me and it shows restrictive pattern with reduced diffusion capacity. This is reflective of previous history of smoking in a morbidly obese patient.  -No clear airway obstruction seen.  -I have advised the patient to use albuterol liberally in order for me to assess if bronchodilators will be beneficial.    2. Class 3 severe obesity due to excess calories with serious comorbidity and body mass index (BMI) of 50.0 to 59.9 in adult [facility])  -I discussed weight loss as a treatment strategy in achieving the healthcare goals. If patient loses even 10 to 15% of current body weight, it will be beneficial in improving the overall health. 3. Primary hypertension  -Currently on atenolol, lisinopril  -Refractory hypertension could be leading to heart failure with preserved ejection fraction.  -Uncontrolled hypertension could be a sign of untreated sleep apnea. 4. ALEXANDRU (obstructive sleep apnea)  -Patient does shallow breathing at nighttime and does have daytime sleepiness.  -I also suspect that she may have obesity hypoventilation syndrome.  -Patient has been unable to get a sleep study due to multiple hospitalizations.  -At this time she is planned to undergo colonoscopy and iron infusion.  -I told her to call my office once she is ready to get the sleep study done.     5. Shortness

## 2023-08-11 NOTE — TELEPHONE ENCOUNTER
----- Message from SONDRA King - CNS sent at 8/11/2023  8:51 AM EDT -----  She is very anemic  I want her to get IV venofer in infusion center  She must call Dr Juan Humphrey and set up colonoscopy  Thanks    Tried to reach patient, [facility] for her to return our call

## 2023-08-18 ENCOUNTER — APPOINTMENT (OUTPATIENT)
Dept: GENERAL RADIOLOGY | Age: 73
End: 2023-08-18
Payer: MEDICARE

## 2023-08-18 ENCOUNTER — HOSPITAL ENCOUNTER (EMERGENCY)
Age: 73
Discharge: HOME | End: 2023-08-19
Attending: EMERGENCY MEDICINE
Payer: MEDICARE

## 2023-08-18 VITALS
HEART RATE: 92 BPM | RESPIRATION RATE: 15 BRPM | WEIGHT: 271 LBS | TEMPERATURE: 98 F | BODY MASS INDEX: 51.16 KG/M2 | DIASTOLIC BLOOD PRESSURE: 93 MMHG | OXYGEN SATURATION: 96 % | SYSTOLIC BLOOD PRESSURE: 107 MMHG | HEIGHT: 61 IN

## 2023-08-18 DIAGNOSIS — J44.1 COPD EXACERBATION (HCC): Primary | ICD-10-CM

## 2023-08-18 DIAGNOSIS — R73.9 HYPERGLYCEMIA: ICD-10-CM

## 2023-08-18 LAB
ALBUMIN SERPL-MCNC: 4.3 G/DL (ref 3.4–5)
ALBUMIN/GLOB SERPL: 1.3 {RATIO} (ref 1.1–2.2)
ALP SERPL-CCNC: 98 U/L (ref 40–129)
ALT SERPL-CCNC: 11 U/L (ref 10–40)
ANION GAP SERPL CALCULATED.3IONS-SCNC: 12 MMOL/L (ref 3–16)
AST SERPL-CCNC: 17 U/L (ref 15–37)
BASOPHILS # BLD: 0 K/UL (ref 0–0.2)
BASOPHILS NFR BLD: 0.6 %
BILIRUB SERPL-MCNC: 0.5 MG/DL (ref 0–1)
BUN SERPL-MCNC: 16 MG/DL (ref 7–20)
CALCIUM SERPL-MCNC: 9.6 MG/DL (ref 8.3–10.6)
CHLORIDE SERPL-SCNC: 95 MMOL/L (ref 99–110)
CO2 SERPL-SCNC: 28 MMOL/L (ref 21–32)
CREAT SERPL-MCNC: 0.7 MG/DL (ref 0.6–1.2)
D DIMER: 0.35 UG/ML FEU (ref 0–0.6)
DEPRECATED RDW RBC AUTO: 16.8 % (ref 12.4–15.4)
EOSINOPHIL # BLD: 0.2 K/UL (ref 0–0.6)
EOSINOPHIL NFR BLD: 3.2 %
GFR SERPLBLD CREATININE-BSD FMLA CKD-EPI: >60 ML/MIN/{1.73_M2}
GLUCOSE BLD-MCNC: 321 MG/DL (ref 70–99)
GLUCOSE SERPL-MCNC: 288 MG/DL (ref 70–99)
HCT VFR BLD AUTO: 27.6 % (ref 36–48)
HGB BLD-MCNC: 8.5 G/DL (ref 12–16)
LYMPHOCYTES # BLD: 1 K/UL (ref 1–5.1)
LYMPHOCYTES NFR BLD: 16.3 %
MCH RBC QN AUTO: 26 PG (ref 26–34)
MCHC RBC AUTO-ENTMCNC: 30.6 G/DL (ref 31–36)
MCV RBC AUTO: 84.8 FL (ref 80–100)
MONOCYTES # BLD: 0.3 K/UL (ref 0–1.3)
MONOCYTES NFR BLD: 5.6 %
NEUTROPHILS # BLD: 4.4 K/UL (ref 1.7–7.7)
NEUTROPHILS NFR BLD: 74.3 %
NT-PROBNP SERPL-MCNC: 42 PG/ML (ref 0–124)
PERFORMED ON: ABNORMAL
PLATELET # BLD AUTO: 166 K/UL (ref 135–450)
PMV BLD AUTO: 9.6 FL (ref 5–10.5)
POTASSIUM SERPL-SCNC: 3.9 MMOL/L (ref 3.5–5.1)
PROT SERPL-MCNC: 7.5 G/DL (ref 6.4–8.2)
RBC # BLD AUTO: 3.26 M/UL (ref 4–5.2)
SARS-COV-2 RDRP RESP QL NAA+PROBE: NOT DETECTED
SODIUM SERPL-SCNC: 135 MMOL/L (ref 136–145)
TROPONIN, HIGH SENSITIVITY: 17 NG/L (ref 0–14)
WBC # BLD AUTO: 6 K/UL (ref 4–11)

## 2023-08-18 PROCEDURE — 71045 X-RAY EXAM CHEST 1 VIEW: CPT

## 2023-08-18 PROCEDURE — 84484 ASSAY OF TROPONIN QUANT: CPT

## 2023-08-18 PROCEDURE — 94640 AIRWAY INHALATION TREATMENT: CPT

## 2023-08-18 PROCEDURE — 80053 COMPREHEN METABOLIC PANEL: CPT

## 2023-08-18 PROCEDURE — 83880 ASSAY OF NATRIURETIC PEPTIDE: CPT

## 2023-08-18 PROCEDURE — 85025 COMPLETE CBC W/AUTO DIFF WBC: CPT

## 2023-08-18 PROCEDURE — 85379 FIBRIN DEGRADATION QUANT: CPT

## 2023-08-18 PROCEDURE — 87635 SARS-COV-2 COVID-19 AMP PRB: CPT

## 2023-08-18 PROCEDURE — 2700000000 HC OXYGEN THERAPY PER DAY

## 2023-08-18 PROCEDURE — 99285 EMERGENCY DEPT VISIT HI MDM: CPT

## 2023-08-18 PROCEDURE — 93005 ELECTROCARDIOGRAM TRACING: CPT | Performed by: EMERGENCY MEDICINE

## 2023-08-18 PROCEDURE — 6370000000 HC RX 637 (ALT 250 FOR IP): Performed by: PHYSICIAN ASSISTANT

## 2023-08-18 PROCEDURE — 94760 N-INVAS EAR/PLS OXIMETRY 1: CPT

## 2023-08-18 PROCEDURE — 36415 COLL VENOUS BLD VENIPUNCTURE: CPT

## 2023-08-18 RX ORDER — IPRATROPIUM BROMIDE AND ALBUTEROL SULFATE 2.5; .5 MG/3ML; MG/3ML
1 SOLUTION RESPIRATORY (INHALATION) ONCE
Status: COMPLETED | OUTPATIENT
Start: 2023-08-18 | End: 2023-08-18

## 2023-08-18 RX ADMIN — IPRATROPIUM BROMIDE AND ALBUTEROL SULFATE 1 DOSE: .5; 3 SOLUTION RESPIRATORY (INHALATION) at 22:44

## 2023-08-18 ASSESSMENT — ENCOUNTER SYMPTOMS
VOMITING: 0
RHINORRHEA: 0
COUGH: 0
EYE PAIN: 0
BACK PAIN: 0
ABDOMINAL PAIN: 0
CONSTIPATION: 0
NAUSEA: 0
DIARRHEA: 0
SORE THROAT: 0
SHORTNESS OF BREATH: 1

## 2023-08-18 ASSESSMENT — LIFESTYLE VARIABLES
HOW MANY STANDARD DRINKS CONTAINING ALCOHOL DO YOU HAVE ON A TYPICAL DAY: PATIENT DOES NOT DRINK
HOW OFTEN DO YOU HAVE A DRINK CONTAINING ALCOHOL: NEVER

## 2023-08-19 LAB
EKG ATRIAL RATE: 94 BPM
EKG DIAGNOSIS: NORMAL
EKG P AXIS: 41 DEGREES
EKG P-R INTERVAL: 136 MS
EKG Q-T INTERVAL: 342 MS
EKG QRS DURATION: 74 MS
EKG QTC CALCULATION (BAZETT): 427 MS
EKG R AXIS: -3 DEGREES
EKG T AXIS: 94 DEGREES
EKG VENTRICULAR RATE: 94 BPM
TROPONIN, HIGH SENSITIVITY: 16 NG/L (ref 0–14)

## 2023-08-19 PROCEDURE — 93010 ELECTROCARDIOGRAM REPORT: CPT | Performed by: INTERNAL MEDICINE

## 2023-08-19 NOTE — DISCHARGE INSTRUCTIONS
Follow up with your primary care provider in one week for a recheck    Use your inhaler as needed. Check you blood sugar throughout the day over the weekend. Return to the ED if you have any worsening symptoms.

## 2023-08-19 NOTE — ED PROVIDER NOTES
is COPD exacerbation. She is not requiring increased oxygen demand. She otherwise looks well. I offered to not start her on steroids at this time as it would likely worsen her blood sugar issues as well. Follow-up closely with her doctor return for any concern. Medications given:  Medications   ipratropium 0.5 mg-albuterol 2.5 mg (DUONEB) nebulizer solution 1 Dose (1 Dose Inhalation Given 8/18/23 3818)        Discharge Medications:  Discharge Medication List as of 8/19/2023 12:15 AM          PROCEDURES  None    CLINICAL IMPRESSION  1. COPD exacerbation ([address])    2. Hyperglycemia        DISPOSITION  Will White was discharged in stable condition. CRITICAL CARE TIME  None        Electronically signed by: Peggy Moreno.  Lonni Sever., Norma CHINO, 8/19/2023  12:26 AM        Billie Bartholomew DO  08/19/23 9559

## 2023-08-19 NOTE — ED PROVIDER NOTES
08/18/23 2245 08/18/23 2300 08/18/23 2315   BP: (!) 107/93      Pulse: 100 94 96 92   Resp: (!) 35 28 30 15   Temp:       SpO2: 96% 98% 97% 96%   Weight:       Height:           Patient was given the following medications:  Medications   ipratropium 0.5 mg-albuterol 2.5 mg (DUONEB) nebulizer solution 1 Dose (1 Dose Inhalation Given 8/18/23 2244)             Is this patient to be included in the SEP-1 Core Measure due to severe sepsis or septic shock? No   Exclusion criteria - the patient is NOT to be included for SEP-1 Core Measure due to:  2+ SIRS criteria are not met    CONSULTS: (Who and What was discussed)  None  Discussion with Other Profesionals : None    Social Determinants : None    Records Reviewed : None    CC/HPI Summary, DDx, ED Course, and Reassessment: This is a 82-year-old female who presents emergency room due to shortness of breath with exertion she wears 2 L nasal cannula at baseline due to COPD. Currently on Plavix. She denies any chest pain today    CBC with a hemoglobin of 8.5. CMP sodium 135 glucose 288. Initial troponin of 17. EKG read as normal sinus rhythm chest x-ray did not show acute abnormalities. Patient's D-dimer was negative at 0.35. COVID-19 was not detected BNP of 42. While here in the emergency room patient was given a breathing treatment with DuoNeb as there was wheezing on exam initially. Second troponin improved to 16. Patient was feeling better at time of discharge and was wanting to be discharged home. At this time I have low suspicion for pneumonia, pertussis, hemothorax, pneumothorax, pericarditis, myocarditis, tension pneumothorax, influenza, COVID-19, pulmonary embolism, Aortic dissection, AAA or other acute emergency pathologies. I am the Primary Clinician of Record. FINAL IMPRESSION      1. COPD exacerbation ([address])    2.  Hyperglycemia          DISPOSITION/PLAN     DISPOSITION Decision To Discharge 08/19/2023 12:10:58 AM      PATIENT REFERRED TO:  Jacques Mathew MD  [address] [address]  [phone]    Schedule an appointment as soon as possible for a visit in 1 week  [facility] Emergency Department  [facility]  [phone]    As needed, If symptoms worsen      DISCHARGE MEDICATIONS:  Discharge Medication List as of 8/19/2023 12:15 AM          DISCONTINUED MEDICATIONS:  Discharge Medication List as of 8/19/2023 12:15 AM                 (Please note that portions of this note were completed with a voice recognition program.  Efforts were made to edit the dictations but occasionally words are mis-transcribed.)    ZACK Miller (electronically signed)            ZACK Miller  08/19/23 8227

## 2023-08-22 ENCOUNTER — HOSPITAL ENCOUNTER (OUTPATIENT)
Dept: ONCOLOGY | Age: 73
Setting detail: INFUSION SERIES
Discharge: HOME | End: 2023-08-22
Payer: MEDICARE

## 2023-08-22 VITALS
HEART RATE: 84 BPM | RESPIRATION RATE: 16 BRPM | TEMPERATURE: 97.4 F | DIASTOLIC BLOOD PRESSURE: 66 MMHG | SYSTOLIC BLOOD PRESSURE: 100 MMHG

## 2023-08-22 DIAGNOSIS — D50.9 IRON DEFICIENCY ANEMIA, UNSPECIFIED IRON DEFICIENCY ANEMIA TYPE: ICD-10-CM

## 2023-08-22 DIAGNOSIS — D64.9 ANEMIA, UNSPECIFIED TYPE: Primary | ICD-10-CM

## 2023-08-22 PROCEDURE — 96365 THER/PROPH/DIAG IV INF INIT: CPT

## 2023-08-22 PROCEDURE — 99211 OFF/OP EST MAY X REQ PHY/QHP: CPT

## 2023-08-22 PROCEDURE — 2580000003 HC RX 258: Performed by: CLINICAL NURSE SPECIALIST

## 2023-08-22 PROCEDURE — 6360000002 HC RX W HCPCS: Performed by: CLINICAL NURSE SPECIALIST

## 2023-08-22 RX ORDER — SODIUM CHLORIDE 9 MG/ML
5-250 INJECTION, SOLUTION INTRAVENOUS ONCE
Status: CANCELLED | OUTPATIENT
Start: 2023-08-29 | End: 2023-08-29

## 2023-08-22 RX ORDER — SODIUM CHLORIDE 0.9 % (FLUSH) 0.9 %
5-40 SYRINGE (ML) INJECTION ONCE
Status: CANCELLED | OUTPATIENT
Start: 2023-08-29 | End: 2023-08-29

## 2023-08-22 RX ORDER — SODIUM CHLORIDE 9 MG/ML
5-250 INJECTION, SOLUTION INTRAVENOUS ONCE
Status: COMPLETED | OUTPATIENT
Start: 2023-08-22 | End: 2023-08-22

## 2023-08-22 RX ORDER — SODIUM CHLORIDE 0.9 % (FLUSH) 0.9 %
5-40 SYRINGE (ML) INJECTION ONCE
Status: COMPLETED | OUTPATIENT
Start: 2023-08-22 | End: 2023-08-22

## 2023-08-22 RX ADMIN — Medication 10 ML: at 14:15

## 2023-08-22 RX ADMIN — IRON SUCROSE 200 MG: 20 INJECTION, SOLUTION INTRAVENOUS at 14:17

## 2023-08-22 RX ADMIN — SODIUM CHLORIDE 100 ML/HR: 9 INJECTION, SOLUTION INTRAVENOUS at 14:16

## 2023-08-22 NOTE — PROGRESS NOTES
Patient ambulatory to department for first of five doses of venofer. Tolerated venofer infusion well with no adverse rx noted. Given avs with information about venofer. Verbally told about most common possible side effects. To return next week for next infusion.

## 2023-08-29 ENCOUNTER — HOSPITAL ENCOUNTER (OUTPATIENT)
Dept: ONCOLOGY | Age: 73
Setting detail: INFUSION SERIES
Discharge: HOME | End: 2023-08-29
Payer: MEDICARE

## 2023-08-29 VITALS
TEMPERATURE: 96.9 F | SYSTOLIC BLOOD PRESSURE: 98 MMHG | HEART RATE: 83 BPM | RESPIRATION RATE: 16 BRPM | DIASTOLIC BLOOD PRESSURE: 66 MMHG

## 2023-08-29 DIAGNOSIS — D64.9 ANEMIA, UNSPECIFIED TYPE: Primary | ICD-10-CM

## 2023-08-29 DIAGNOSIS — D50.9 IRON DEFICIENCY ANEMIA, UNSPECIFIED IRON DEFICIENCY ANEMIA TYPE: ICD-10-CM

## 2023-08-29 PROCEDURE — 6360000002 HC RX W HCPCS: Performed by: CLINICAL NURSE SPECIALIST

## 2023-08-29 PROCEDURE — 99211 OFF/OP EST MAY X REQ PHY/QHP: CPT

## 2023-08-29 PROCEDURE — 2580000003 HC RX 258: Performed by: CLINICAL NURSE SPECIALIST

## 2023-08-29 PROCEDURE — 96365 THER/PROPH/DIAG IV INF INIT: CPT

## 2023-08-29 RX ORDER — SODIUM CHLORIDE 0.9 % (FLUSH) 0.9 %
5-40 SYRINGE (ML) INJECTION ONCE
Status: COMPLETED | OUTPATIENT
Start: 2023-08-29 | End: 2023-08-29

## 2023-08-29 RX ORDER — SODIUM CHLORIDE 9 MG/ML
5-250 INJECTION, SOLUTION INTRAVENOUS ONCE
Status: COMPLETED | OUTPATIENT
Start: 2023-08-29 | End: 2023-08-29

## 2023-08-29 RX ORDER — SODIUM CHLORIDE 9 MG/ML
5-250 INJECTION, SOLUTION INTRAVENOUS ONCE
Status: CANCELLED | OUTPATIENT
Start: 2023-09-05 | End: 2023-09-05

## 2023-08-29 RX ORDER — SODIUM CHLORIDE 0.9 % (FLUSH) 0.9 %
5-40 SYRINGE (ML) INJECTION ONCE
Status: CANCELLED | OUTPATIENT
Start: 2023-09-05 | End: 2023-09-05

## 2023-08-29 RX ADMIN — SODIUM CHLORIDE 220 ML/HR: 9 INJECTION, SOLUTION INTRAVENOUS at 14:09

## 2023-08-29 RX ADMIN — Medication 200 MG: at 14:10

## 2023-08-29 RX ADMIN — Medication 10 ML: at 14:08

## 2023-08-29 NOTE — PROGRESS NOTES
Patient ambulatory to department for second of five doses of venofer. Tolerated venofer infusion well with no adverse rx noted. Denied need for printed AVS. To return next week for next infusion.

## 2023-09-01 ENCOUNTER — TELEPHONE (OUTPATIENT)
Dept: CARDIOLOGY CLINIC | Age: 73
End: 2023-09-01

## 2023-09-01 NOTE — TELEPHONE ENCOUNTER
Pt states she is short winded with exertion. And states she is having difficulty eating. Please call pt to advise.

## 2023-09-01 NOTE — TELEPHONE ENCOUNTER
Spoke to patient she having sob for about 1 week. Seen by PCP yesterday she stated PCP told her it was related to her weight. Started IV iron infusions and PCP prescribed Iron 3 times a day. She stated she is not taking the iron NPRG said not to. She stated feeling sob when eating can hardly get a breath. No swelling in extremities. Swelling in abdomen. No weight gain, current weight 276, not watching fluids or salt intake, has eaten fast food and salty snacks. Taking medications as prescribed.

## 2023-09-01 NOTE — TELEPHONE ENCOUNTER
She has to stop the madness with the sodium intake. No fast food. No salty foods. She can try taking an extra 40 mg lasix for a couple of days. If it worsens, she would need to go to the ED.   MACY

## 2023-09-06 ENCOUNTER — HOSPITAL ENCOUNTER (OUTPATIENT)
Dept: ONCOLOGY | Age: 73
Setting detail: INFUSION SERIES
Discharge: HOME | End: 2023-09-06
Payer: MEDICARE

## 2023-09-06 VITALS
RESPIRATION RATE: 16 BRPM | SYSTOLIC BLOOD PRESSURE: 118 MMHG | TEMPERATURE: 97.6 F | HEART RATE: 87 BPM | OXYGEN SATURATION: 95 % | DIASTOLIC BLOOD PRESSURE: 59 MMHG

## 2023-09-06 DIAGNOSIS — D64.9 ANEMIA, UNSPECIFIED TYPE: Primary | ICD-10-CM

## 2023-09-06 DIAGNOSIS — D50.9 IRON DEFICIENCY ANEMIA, UNSPECIFIED IRON DEFICIENCY ANEMIA TYPE: ICD-10-CM

## 2023-09-06 PROCEDURE — 6360000002 HC RX W HCPCS: Performed by: CLINICAL NURSE SPECIALIST

## 2023-09-06 PROCEDURE — 2580000003 HC RX 258: Performed by: CLINICAL NURSE SPECIALIST

## 2023-09-06 PROCEDURE — 96365 THER/PROPH/DIAG IV INF INIT: CPT

## 2023-09-06 PROCEDURE — 99211 OFF/OP EST MAY X REQ PHY/QHP: CPT

## 2023-09-06 RX ORDER — SODIUM CHLORIDE 0.9 % (FLUSH) 0.9 %
5-40 SYRINGE (ML) INJECTION ONCE
Status: COMPLETED | OUTPATIENT
Start: 2023-09-06 | End: 2023-09-06

## 2023-09-06 RX ORDER — SODIUM CHLORIDE 0.9 % (FLUSH) 0.9 %
5-40 SYRINGE (ML) INJECTION ONCE
Status: CANCELLED | OUTPATIENT
Start: 2023-09-12 | End: 2023-09-12

## 2023-09-06 RX ORDER — SODIUM CHLORIDE 9 MG/ML
5-250 INJECTION, SOLUTION INTRAVENOUS ONCE
Status: CANCELLED | OUTPATIENT
Start: 2023-09-12 | End: 2023-09-12

## 2023-09-06 RX ORDER — SODIUM CHLORIDE 9 MG/ML
5-250 INJECTION, SOLUTION INTRAVENOUS ONCE
Status: COMPLETED | OUTPATIENT
Start: 2023-09-06 | End: 2023-09-06

## 2023-09-06 RX ADMIN — SODIUM CHLORIDE 120 ML/HR: 9 INJECTION, SOLUTION INTRAVENOUS at 14:00

## 2023-09-06 RX ADMIN — Medication 10 ML: at 13:59

## 2023-09-06 RX ADMIN — Medication 200 MG: at 14:01

## 2023-09-06 NOTE — PROGRESS NOTES
Patient ambulatory to department for third of five doses of venofer. Tolerated venofer infusion well with no adverse rx noted. Denied need for printed AVS. To return next week for next infusion

## 2023-09-10 ENCOUNTER — APPOINTMENT (OUTPATIENT)
Dept: GENERAL RADIOLOGY | Age: 73
DRG: 191 | End: 2023-09-10
Payer: MEDICARE

## 2023-09-10 ENCOUNTER — HOSPITAL ENCOUNTER (EMERGENCY)
Age: 73
Discharge: HOME | DRG: 191 | End: 2023-09-10
Attending: EMERGENCY MEDICINE
Payer: MEDICARE

## 2023-09-10 VITALS
DIASTOLIC BLOOD PRESSURE: 65 MMHG | HEART RATE: 107 BPM | OXYGEN SATURATION: 93 % | SYSTOLIC BLOOD PRESSURE: 157 MMHG | WEIGHT: 270 LBS | TEMPERATURE: 98.2 F | RESPIRATION RATE: 20 BRPM | BODY MASS INDEX: 51.02 KG/M2

## 2023-09-10 DIAGNOSIS — R73.9 HYPERGLYCEMIA: ICD-10-CM

## 2023-09-10 DIAGNOSIS — R06.00 DYSPNEA, UNSPECIFIED TYPE: Primary | ICD-10-CM

## 2023-09-10 DIAGNOSIS — D64.9 CHRONIC ANEMIA: ICD-10-CM

## 2023-09-10 DIAGNOSIS — I50.9 ACUTE ON CHRONIC CONGESTIVE HEART FAILURE, UNSPECIFIED HEART FAILURE TYPE (HCC): ICD-10-CM

## 2023-09-10 DIAGNOSIS — J18.9 PNEUMONITIS: ICD-10-CM

## 2023-09-10 LAB
ALBUMIN SERPL-MCNC: 4.2 G/DL (ref 3.4–5)
ALBUMIN/GLOB SERPL: 1.3 {RATIO} (ref 1.1–2.2)
ALP SERPL-CCNC: 89 U/L (ref 40–129)
ALT SERPL-CCNC: 10 U/L (ref 10–40)
ANION GAP SERPL CALCULATED.3IONS-SCNC: 11 MMOL/L (ref 3–16)
AST SERPL-CCNC: 19 U/L (ref 15–37)
BASOPHILS # BLD: 0 K/UL (ref 0–0.2)
BASOPHILS NFR BLD: 0.4 %
BILIRUB SERPL-MCNC: 0.5 MG/DL (ref 0–1)
BUN SERPL-MCNC: 18 MG/DL (ref 7–20)
CALCIUM SERPL-MCNC: 9.7 MG/DL (ref 8.3–10.6)
CHLORIDE SERPL-SCNC: 93 MMOL/L (ref 99–110)
CO2 SERPL-SCNC: 28 MMOL/L (ref 21–32)
CREAT SERPL-MCNC: 0.9 MG/DL (ref 0.6–1.2)
D DIMER: 0.37 UG/ML FEU (ref 0–0.6)
DEPRECATED RDW RBC AUTO: 21.8 % (ref 12.4–15.4)
EOSINOPHIL # BLD: 0.2 K/UL (ref 0–0.6)
EOSINOPHIL NFR BLD: 2.6 %
GFR SERPLBLD CREATININE-BSD FMLA CKD-EPI: >60 ML/MIN/{1.73_M2}
GLUCOSE BLD-MCNC: 275 MG/DL (ref 70–99)
GLUCOSE SERPL-MCNC: 413 MG/DL (ref 70–99)
HCT VFR BLD AUTO: 27.3 % (ref 36–48)
HGB BLD-MCNC: 8.3 G/DL (ref 12–16)
LYMPHOCYTES # BLD: 1.2 K/UL (ref 1–5.1)
LYMPHOCYTES NFR BLD: 18 %
MCH RBC QN AUTO: 25.8 PG (ref 26–34)
MCHC RBC AUTO-ENTMCNC: 30.3 G/DL (ref 31–36)
MCV RBC AUTO: 84.9 FL (ref 80–100)
MONOCYTES # BLD: 0.4 K/UL (ref 0–1.3)
MONOCYTES NFR BLD: 6.7 %
NEUTROPHILS # BLD: 4.7 K/UL (ref 1.7–7.7)
NEUTROPHILS NFR BLD: 72.3 %
NT-PROBNP SERPL-MCNC: <36 PG/ML (ref 0–124)
PERFORMED ON: ABNORMAL
PLATELET # BLD AUTO: 138 K/UL (ref 135–450)
PMV BLD AUTO: 9 FL (ref 5–10.5)
POTASSIUM SERPL-SCNC: 3.9 MMOL/L (ref 3.5–5.1)
PROT SERPL-MCNC: 7.4 G/DL (ref 6.4–8.2)
RBC # BLD AUTO: 3.21 M/UL (ref 4–5.2)
SARS-COV-2 RDRP RESP QL NAA+PROBE: NOT DETECTED
SODIUM SERPL-SCNC: 132 MMOL/L (ref 136–145)
TROPONIN, HIGH SENSITIVITY: 15 NG/L (ref 0–14)
TROPONIN, HIGH SENSITIVITY: 16 NG/L (ref 0–14)
WBC # BLD AUTO: 6.5 K/UL (ref 4–11)

## 2023-09-10 PROCEDURE — 87635 SARS-COV-2 COVID-19 AMP PRB: CPT

## 2023-09-10 PROCEDURE — 99285 EMERGENCY DEPT VISIT HI MDM: CPT

## 2023-09-10 PROCEDURE — 6370000000 HC RX 637 (ALT 250 FOR IP): Performed by: EMERGENCY MEDICINE

## 2023-09-10 PROCEDURE — 83880 ASSAY OF NATRIURETIC PEPTIDE: CPT

## 2023-09-10 PROCEDURE — 85025 COMPLETE CBC W/AUTO DIFF WBC: CPT

## 2023-09-10 PROCEDURE — 84484 ASSAY OF TROPONIN QUANT: CPT

## 2023-09-10 PROCEDURE — 94640 AIRWAY INHALATION TREATMENT: CPT

## 2023-09-10 PROCEDURE — 80053 COMPREHEN METABOLIC PANEL: CPT

## 2023-09-10 PROCEDURE — 93005 ELECTROCARDIOGRAM TRACING: CPT | Performed by: EMERGENCY MEDICINE

## 2023-09-10 PROCEDURE — 85379 FIBRIN DEGRADATION QUANT: CPT

## 2023-09-10 PROCEDURE — 71045 X-RAY EXAM CHEST 1 VIEW: CPT

## 2023-09-10 RX ORDER — IPRATROPIUM BROMIDE AND ALBUTEROL SULFATE 2.5; .5 MG/3ML; MG/3ML
1 SOLUTION RESPIRATORY (INHALATION) ONCE
Status: COMPLETED | OUTPATIENT
Start: 2023-09-10 | End: 2023-09-10

## 2023-09-10 RX ADMIN — IPRATROPIUM BROMIDE AND ALBUTEROL SULFATE 1 DOSE: .5; 3 SOLUTION RESPIRATORY (INHALATION) at 18:31

## 2023-09-10 RX ADMIN — INSULIN HUMAN 10 UNITS: 100 INJECTION, SOLUTION PARENTERAL at 19:34

## 2023-09-10 ASSESSMENT — PAIN - FUNCTIONAL ASSESSMENT: PAIN_FUNCTIONAL_ASSESSMENT: 0-10

## 2023-09-10 ASSESSMENT — PAIN SCALES - GENERAL: PAINLEVEL_OUTOF10: 6

## 2023-09-10 ASSESSMENT — PAIN DESCRIPTION - LOCATION: LOCATION: CHEST

## 2023-09-10 NOTE — ED PROVIDER NOTES
EMERGENCY MEDICINE PROVIDER NOTE    Patient Identification  Pt Name: Patience Bansal  MRN: [id number]  [address] [facility] 1950  Date of evaluation: 9/10/2023  Provider: Ophelia Garzon MD  PCP: Julien Padilla MD    Chief Complaint  Shortness of Breath (Pt c/o increased Sob and intermittent CP times 3 days with O2 sats dipping down to 86%. Wears 2LNC. Denies fever)      HPI  (History provided by patient)  This is a 81 y.o. female PMH including acute on chronic respiratory failure, CHF, iron deficiency anemia, pancytopenia, HTN, CAD, DM 2, history of PE previously on Xarelto but was discontinued who was brought in by self for shortness of breath. Patient states she has noticed her oxygen dropping to mid 80s intermittently for the past 3 days. Reports increasing dyspnea with exertion and some chest pressure that is substernal starting today. On 2 L NC oxygen requirement. Denies other symptoms including cough, measured fevers, nausea, vomiting, diarrhea, abdominal pain. States she has had some intermittent dark stool but is taking iron supplements and getting intermittent blood transfusions for history of pancytopenia. Takes Plavix daily. Was previously on Xarelto for a remote history of a PE but this was discontinued once 2 years ago. States she does not have significant leg swelling and has been compliant with her home dose Lasix.     I have reviewed the following nursing documentation:  Allergies: Penicillins    Past medical history:   Past Medical History:   Diagnosis Date    Asthma     Back pain     DJD    Bipolar 1 disorder ([address])     CAD (coronary artery disease)     Cancer ([address])     Left breast CA    Cataract     CHF (congestive heart failure) (HCC)     HFpEF    COPD (chronic obstructive pulmonary disease) ([address])     Depression     Diabetes mellitus ([address])     GERD (gastroesophageal reflux disease)     Hx of blood clots     PE 06/02/2018    Hypercholesteremia     Hyperlipidemia     Hypertension     Morbid This chart was generated using the [facility] dictation system. I created this record but it may contain dictation errors given the limitations of this technology.        Ophelia Garzon MD  09/11/23 1149

## 2023-09-10 NOTE — DISCHARGE INSTRUCTIONS
Please return the emergency department any new or worsening symptoms including chest pain, shortness of breath, fevers. Please continue to take your diabetes medications as prescribed and monitor your blood sugars closely. Please increase your dosage of Lasix to 2 tablets twice daily for the next 3 days then return to normal dosing. Please call to schedule a follow-up appointment with pulmonology at the numbers provided above. Please follow-up with your primary care physician in the next 3 to 5 days to ensure that symptoms are improving.

## 2023-09-11 LAB
EKG ATRIAL RATE: 100 BPM
EKG DIAGNOSIS: NORMAL
EKG P AXIS: 45 DEGREES
EKG P-R INTERVAL: 136 MS
EKG Q-T INTERVAL: 326 MS
EKG QRS DURATION: 82 MS
EKG QTC CALCULATION (BAZETT): 420 MS
EKG R AXIS: -4 DEGREES
EKG T AXIS: 115 DEGREES
EKG VENTRICULAR RATE: 100 BPM

## 2023-09-11 PROCEDURE — 93010 ELECTROCARDIOGRAM REPORT: CPT | Performed by: INTERNAL MEDICINE

## 2023-09-12 ENCOUNTER — APPOINTMENT (OUTPATIENT)
Dept: CT IMAGING | Age: 73
DRG: 191 | End: 2023-09-12
Payer: MEDICARE

## 2023-09-12 ENCOUNTER — HOSPITAL ENCOUNTER (INPATIENT)
Age: 73
LOS: 1 days | Discharge: HOME | DRG: 191 | End: 2023-09-14
Attending: INTERNAL MEDICINE | Admitting: HOSPITALIST
Payer: MEDICARE

## 2023-09-12 ENCOUNTER — HOSPITAL ENCOUNTER (OUTPATIENT)
Dept: ONCOLOGY | Age: 73
Setting detail: INFUSION SERIES
Discharge: HOME | DRG: 191 | End: 2023-09-12
Payer: MEDICARE

## 2023-09-12 VITALS
HEART RATE: 78 BPM | TEMPERATURE: 96.9 F | RESPIRATION RATE: 18 BRPM | SYSTOLIC BLOOD PRESSURE: 90 MMHG | OXYGEN SATURATION: 99 % | DIASTOLIC BLOOD PRESSURE: 50 MMHG

## 2023-09-12 DIAGNOSIS — D50.9 IRON DEFICIENCY ANEMIA, UNSPECIFIED IRON DEFICIENCY ANEMIA TYPE: ICD-10-CM

## 2023-09-12 DIAGNOSIS — J96.01 ACUTE RESPIRATORY FAILURE WITH HYPOXIA (HCC): Primary | ICD-10-CM

## 2023-09-12 DIAGNOSIS — D64.9 ANEMIA, UNSPECIFIED TYPE: Primary | ICD-10-CM

## 2023-09-12 PROBLEM — J45.901 ASTHMA EXACERBATION ATTACKS: Status: ACTIVE | Noted: 2023-09-12

## 2023-09-12 LAB
ANION GAP SERPL CALCULATED.3IONS-SCNC: 13 MMOL/L (ref 3–16)
BASE EXCESS BLDV CALC-SCNC: 4.9 MMOL/L (ref -3–3)
BASOPHILS # BLD: 0 K/UL (ref 0–0.2)
BASOPHILS NFR BLD: 0.7 %
BUN SERPL-MCNC: 16 MG/DL (ref 7–20)
CALCIUM SERPL-MCNC: 9.1 MG/DL (ref 8.3–10.6)
CHLORIDE SERPL-SCNC: 95 MMOL/L (ref 99–110)
CO2 BLDV-SCNC: 70 MMOL/L
CO2 SERPL-SCNC: 27 MMOL/L (ref 21–32)
COHGB MFR BLDV: 5.9 % (ref 0–1.5)
CREAT SERPL-MCNC: 0.7 MG/DL (ref 0.6–1.2)
DEPRECATED RDW RBC AUTO: 21.1 % (ref 12.4–15.4)
EOSINOPHIL # BLD: 0.2 K/UL (ref 0–0.6)
EOSINOPHIL NFR BLD: 3.3 %
GFR SERPLBLD CREATININE-BSD FMLA CKD-EPI: >60 ML/MIN/{1.73_M2}
GLUCOSE SERPL-MCNC: 400 MG/DL (ref 70–99)
HCO3 BLDV-SCNC: 29.9 MMOL/L (ref 23–29)
HCT VFR BLD AUTO: 27.7 % (ref 36–48)
HGB BLD-MCNC: 8.5 G/DL (ref 12–16)
LYMPHOCYTES # BLD: 1 K/UL (ref 1–5.1)
LYMPHOCYTES NFR BLD: 15.2 %
MCH RBC QN AUTO: 26.2 PG (ref 26–34)
MCHC RBC AUTO-ENTMCNC: 30.9 G/DL (ref 31–36)
MCV RBC AUTO: 84.9 FL (ref 80–100)
METHGB MFR BLDV: 0.4 %
MONOCYTES # BLD: 0.4 K/UL (ref 0–1.3)
MONOCYTES NFR BLD: 6.4 %
NEUTROPHILS # BLD: 5 K/UL (ref 1.7–7.7)
NEUTROPHILS NFR BLD: 74.4 %
NT-PROBNP SERPL-MCNC: 41 PG/ML (ref 0–124)
O2 CT VFR BLDV CALC: 12 VOL %
O2 THERAPY: ABNORMAL
PCO2 BLDV: 45.6 MMHG (ref 40–50)
PH BLDV: 7.42 [PH] (ref 7.35–7.45)
PLATELET # BLD AUTO: 141 K/UL (ref 135–450)
PMV BLD AUTO: 9.7 FL (ref 5–10.5)
PO2 BLDV: 108 MMHG (ref 25–40)
POTASSIUM SERPL-SCNC: 4.3 MMOL/L (ref 3.5–5.1)
PROCALCITONIN SERPL IA-MCNC: 0.08 NG/ML (ref 0–0.15)
RBC # BLD AUTO: 3.26 M/UL (ref 4–5.2)
SAO2 % BLDV: 100 %
SODIUM SERPL-SCNC: 135 MMOL/L (ref 136–145)
WBC # BLD AUTO: 6.7 K/UL (ref 4–11)

## 2023-09-12 PROCEDURE — 99285 EMERGENCY DEPT VISIT HI MDM: CPT

## 2023-09-12 PROCEDURE — 6360000002 HC RX W HCPCS: Performed by: INTERNAL MEDICINE

## 2023-09-12 PROCEDURE — 85025 COMPLETE CBC W/AUTO DIFF WBC: CPT

## 2023-09-12 PROCEDURE — 80048 BASIC METABOLIC PNL TOTAL CA: CPT

## 2023-09-12 PROCEDURE — 84145 PROCALCITONIN (PCT): CPT

## 2023-09-12 PROCEDURE — 83036 HEMOGLOBIN GLYCOSYLATED A1C: CPT

## 2023-09-12 PROCEDURE — 0202U NFCT DS 22 TRGT SARS-COV-2: CPT

## 2023-09-12 PROCEDURE — 2580000003 HC RX 258: Performed by: CLINICAL NURSE SPECIALIST

## 2023-09-12 PROCEDURE — 83880 ASSAY OF NATRIURETIC PEPTIDE: CPT

## 2023-09-12 PROCEDURE — 99211 OFF/OP EST MAY X REQ PHY/QHP: CPT

## 2023-09-12 PROCEDURE — 96375 TX/PRO/DX INJ NEW DRUG ADDON: CPT

## 2023-09-12 PROCEDURE — 6370000000 HC RX 637 (ALT 250 FOR IP): Performed by: INTERNAL MEDICINE

## 2023-09-12 PROCEDURE — 6360000002 HC RX W HCPCS: Performed by: CLINICAL NURSE SPECIALIST

## 2023-09-12 PROCEDURE — 96365 THER/PROPH/DIAG IV INF INIT: CPT

## 2023-09-12 PROCEDURE — 82803 BLOOD GASES ANY COMBINATION: CPT

## 2023-09-12 PROCEDURE — 6360000004 HC RX CONTRAST MEDICATION: Performed by: INTERNAL MEDICINE

## 2023-09-12 PROCEDURE — 93005 ELECTROCARDIOGRAM TRACING: CPT | Performed by: INTERNAL MEDICINE

## 2023-09-12 PROCEDURE — 71260 CT THORAX DX C+: CPT

## 2023-09-12 PROCEDURE — G0378 HOSPITAL OBSERVATION PER HR: HCPCS

## 2023-09-12 RX ORDER — POLYETHYLENE GLYCOL 3350 17 G/17G
17 POWDER, FOR SOLUTION ORAL DAILY PRN
Status: DISCONTINUED | OUTPATIENT
Start: 2023-09-12 | End: 2023-09-14 | Stop reason: HOSPADM

## 2023-09-12 RX ORDER — ACETAMINOPHEN 325 MG/1
650 TABLET ORAL EVERY 4 HOURS PRN
Status: DISCONTINUED | OUTPATIENT
Start: 2023-09-12 | End: 2023-09-12

## 2023-09-12 RX ORDER — LANOLIN ALCOHOL/MO/W.PET/CERES
3 CREAM (GRAM) TOPICAL NIGHTLY PRN
COMMUNITY

## 2023-09-12 RX ORDER — SODIUM CHLORIDE 0.9 % (FLUSH) 0.9 %
5-40 SYRINGE (ML) INJECTION ONCE
Status: COMPLETED | OUTPATIENT
Start: 2023-09-12 | End: 2023-09-12

## 2023-09-12 RX ORDER — ENOXAPARIN SODIUM 100 MG/ML
30 INJECTION SUBCUTANEOUS 2 TIMES DAILY
Status: DISCONTINUED | OUTPATIENT
Start: 2023-09-13 | End: 2023-09-14 | Stop reason: HOSPADM

## 2023-09-12 RX ORDER — SODIUM CHLORIDE 0.9 % (FLUSH) 0.9 %
5-40 SYRINGE (ML) INJECTION ONCE
Status: CANCELLED | OUTPATIENT
Start: 2023-09-19 | End: 2023-09-19

## 2023-09-12 RX ORDER — ONDANSETRON 4 MG/1
4 TABLET, ORALLY DISINTEGRATING ORAL EVERY 8 HOURS PRN
Status: DISCONTINUED | OUTPATIENT
Start: 2023-09-12 | End: 2023-09-14 | Stop reason: HOSPADM

## 2023-09-12 RX ORDER — IPRATROPIUM BROMIDE AND ALBUTEROL SULFATE 2.5; .5 MG/3ML; MG/3ML
1 SOLUTION RESPIRATORY (INHALATION)
Status: DISCONTINUED | OUTPATIENT
Start: 2023-09-13 | End: 2023-09-14 | Stop reason: HOSPADM

## 2023-09-12 RX ORDER — ACETAMINOPHEN 325 MG/1
650 TABLET ORAL EVERY 6 HOURS PRN
Status: DISCONTINUED | OUTPATIENT
Start: 2023-09-12 | End: 2023-09-14 | Stop reason: HOSPADM

## 2023-09-12 RX ORDER — METHYLPREDNISOLONE SODIUM SUCCINATE 40 MG/ML
40 INJECTION, POWDER, LYOPHILIZED, FOR SOLUTION INTRAMUSCULAR; INTRAVENOUS ONCE
Status: COMPLETED | OUTPATIENT
Start: 2023-09-12 | End: 2023-09-12

## 2023-09-12 RX ORDER — ONDANSETRON 2 MG/ML
4 INJECTION INTRAMUSCULAR; INTRAVENOUS EVERY 6 HOURS PRN
Status: DISCONTINUED | OUTPATIENT
Start: 2023-09-12 | End: 2023-09-14 | Stop reason: HOSPADM

## 2023-09-12 RX ORDER — PREDNISONE 20 MG/1
40 TABLET ORAL DAILY
Status: DISCONTINUED | OUTPATIENT
Start: 2023-09-13 | End: 2023-09-14 | Stop reason: HOSPADM

## 2023-09-12 RX ORDER — SODIUM CHLORIDE 0.9 % (FLUSH) 0.9 %
5-40 SYRINGE (ML) INJECTION EVERY 12 HOURS SCHEDULED
Status: DISCONTINUED | OUTPATIENT
Start: 2023-09-12 | End: 2023-09-14 | Stop reason: HOSPADM

## 2023-09-12 RX ORDER — SODIUM CHLORIDE 0.9 % (FLUSH) 0.9 %
5-40 SYRINGE (ML) INJECTION PRN
Status: DISCONTINUED | OUTPATIENT
Start: 2023-09-12 | End: 2023-09-14 | Stop reason: HOSPADM

## 2023-09-12 RX ORDER — SODIUM CHLORIDE 9 MG/ML
INJECTION, SOLUTION INTRAVENOUS PRN
Status: DISCONTINUED | OUTPATIENT
Start: 2023-09-12 | End: 2023-09-14 | Stop reason: HOSPADM

## 2023-09-12 RX ORDER — ACETAMINOPHEN 650 MG/1
650 SUPPOSITORY RECTAL EVERY 6 HOURS PRN
Status: DISCONTINUED | OUTPATIENT
Start: 2023-09-12 | End: 2023-09-14 | Stop reason: HOSPADM

## 2023-09-12 RX ORDER — TIOTROPIUM BROMIDE INHALATION SPRAY 1.56 UG/1
2 SPRAY, METERED RESPIRATORY (INHALATION) DAILY
COMMUNITY

## 2023-09-12 RX ORDER — SODIUM CHLORIDE 9 MG/ML
5-250 INJECTION, SOLUTION INTRAVENOUS ONCE
Status: COMPLETED | OUTPATIENT
Start: 2023-09-12 | End: 2023-09-12

## 2023-09-12 RX ORDER — SODIUM CHLORIDE 9 MG/ML
5-250 INJECTION, SOLUTION INTRAVENOUS ONCE
Status: CANCELLED | OUTPATIENT
Start: 2023-09-19 | End: 2023-09-19

## 2023-09-12 RX ADMIN — Medication 10 ML: at 14:08

## 2023-09-12 RX ADMIN — IOPAMIDOL 75 ML: 755 INJECTION, SOLUTION INTRAVENOUS at 19:45

## 2023-09-12 RX ADMIN — ACETAMINOPHEN 650 MG: 325 TABLET ORAL at 23:19

## 2023-09-12 RX ADMIN — SODIUM CHLORIDE 200 ML/HR: 9 INJECTION, SOLUTION INTRAVENOUS at 14:09

## 2023-09-12 RX ADMIN — METHYLPREDNISOLONE SODIUM SUCCINATE 40 MG: 40 INJECTION INTRAMUSCULAR; INTRAVENOUS at 23:17

## 2023-09-12 RX ADMIN — Medication 200 MG: at 14:10

## 2023-09-12 NOTE — PROGRESS NOTES
Patient ambulatory to department for fourth of five doses of venofer. Tolerated venofer infusion well with no adverse rx noted. Denied need for printed AVS. To return next week for next infusion

## 2023-09-13 PROBLEM — J44.1 COPD EXACERBATION (HCC): Status: ACTIVE | Noted: 2023-09-13

## 2023-09-13 LAB
EKG ATRIAL RATE: 102 BPM
EKG DIAGNOSIS: NORMAL
EKG P AXIS: 44 DEGREES
EKG P-R INTERVAL: 126 MS
EKG Q-T INTERVAL: 344 MS
EKG QRS DURATION: 74 MS
EKG QTC CALCULATION (BAZETT): 448 MS
EKG R AXIS: -1 DEGREES
EKG T AXIS: 101 DEGREES
EKG VENTRICULAR RATE: 102 BPM
EST. AVERAGE GLUCOSE BLD GHB EST-MCNC: 177.2 MG/DL
GLUCOSE BLD-MCNC: 344 MG/DL (ref 70–99)
GLUCOSE BLD-MCNC: 352 MG/DL (ref 70–99)
GLUCOSE BLD-MCNC: 360 MG/DL (ref 70–99)
GLUCOSE BLD-MCNC: 381 MG/DL (ref 70–99)
GLUCOSE BLD-MCNC: 390 MG/DL (ref 70–99)
HBA1C MFR BLD: 7.8 %
PERFORMED ON: ABNORMAL
REPORT: NORMAL
RESP PATH DNA+RNA PNL NPH NAA+NON-PROBE: NORMAL

## 2023-09-13 PROCEDURE — 2060000000 HC ICU INTERMEDIATE R&B

## 2023-09-13 PROCEDURE — 93010 ELECTROCARDIOGRAM REPORT: CPT | Performed by: INTERNAL MEDICINE

## 2023-09-13 PROCEDURE — 6370000000 HC RX 637 (ALT 250 FOR IP): Performed by: INTERNAL MEDICINE

## 2023-09-13 PROCEDURE — 6370000000 HC RX 637 (ALT 250 FOR IP): Performed by: HOSPITALIST

## 2023-09-13 PROCEDURE — 96372 THER/PROPH/DIAG INJ SC/IM: CPT

## 2023-09-13 PROCEDURE — 94761 N-INVAS EAR/PLS OXIMETRY MLT: CPT

## 2023-09-13 PROCEDURE — 6360000002 HC RX W HCPCS: Performed by: INTERNAL MEDICINE

## 2023-09-13 PROCEDURE — 94640 AIRWAY INHALATION TREATMENT: CPT

## 2023-09-13 PROCEDURE — 2700000000 HC OXYGEN THERAPY PER DAY

## 2023-09-13 PROCEDURE — 2580000003 HC RX 258: Performed by: INTERNAL MEDICINE

## 2023-09-13 RX ORDER — ROSUVASTATIN CALCIUM 20 MG/1
20 TABLET, COATED ORAL NIGHTLY
Status: DISCONTINUED | OUTPATIENT
Start: 2023-09-13 | End: 2023-09-14 | Stop reason: HOSPADM

## 2023-09-13 RX ORDER — CLOPIDOGREL BISULFATE 75 MG/1
75 TABLET ORAL DAILY
Status: DISCONTINUED | OUTPATIENT
Start: 2023-09-13 | End: 2023-09-14 | Stop reason: HOSPADM

## 2023-09-13 RX ORDER — QUETIAPINE FUMARATE 100 MG/1
200 TABLET, FILM COATED ORAL NIGHTLY
Status: DISCONTINUED | OUTPATIENT
Start: 2023-09-13 | End: 2023-09-14 | Stop reason: HOSPADM

## 2023-09-13 RX ORDER — QUETIAPINE FUMARATE 100 MG/1
100 TABLET, FILM COATED ORAL DAILY
Status: DISCONTINUED | OUTPATIENT
Start: 2023-09-13 | End: 2023-09-14 | Stop reason: HOSPADM

## 2023-09-13 RX ORDER — LOSARTAN POTASSIUM 25 MG/1
50 TABLET ORAL DAILY
Status: DISCONTINUED | OUTPATIENT
Start: 2023-09-13 | End: 2023-09-14 | Stop reason: HOSPADM

## 2023-09-13 RX ORDER — MONTELUKAST SODIUM 10 MG/1
10 TABLET ORAL NIGHTLY
Status: DISCONTINUED | OUTPATIENT
Start: 2023-09-13 | End: 2023-09-14 | Stop reason: HOSPADM

## 2023-09-13 RX ORDER — LANOLIN ALCOHOL/MO/W.PET/CERES
3 CREAM (GRAM) TOPICAL NIGHTLY PRN
Status: DISCONTINUED | OUTPATIENT
Start: 2023-09-13 | End: 2023-09-14 | Stop reason: HOSPADM

## 2023-09-13 RX ORDER — ASPIRIN 81 MG/1
81 TABLET, CHEWABLE ORAL DAILY
Status: DISCONTINUED | OUTPATIENT
Start: 2023-09-13 | End: 2023-09-14 | Stop reason: HOSPADM

## 2023-09-13 RX ORDER — CARVEDILOL 6.25 MG/1
6.25 TABLET ORAL 2 TIMES DAILY WITH MEALS
Status: DISCONTINUED | OUTPATIENT
Start: 2023-09-13 | End: 2023-09-14 | Stop reason: HOSPADM

## 2023-09-13 RX ORDER — ARIPIPRAZOLE 5 MG/1
5 TABLET ORAL DAILY
Status: DISCONTINUED | OUTPATIENT
Start: 2023-09-13 | End: 2023-09-14 | Stop reason: HOSPADM

## 2023-09-13 RX ORDER — INSULIN LISPRO 100 [IU]/ML
0-4 INJECTION, SOLUTION INTRAVENOUS; SUBCUTANEOUS NIGHTLY
Status: DISCONTINUED | OUTPATIENT
Start: 2023-09-13 | End: 2023-09-14 | Stop reason: HOSPADM

## 2023-09-13 RX ORDER — INSULIN LISPRO 100 [IU]/ML
0.08 INJECTION, SOLUTION INTRAVENOUS; SUBCUTANEOUS
Status: DISCONTINUED | OUTPATIENT
Start: 2023-09-13 | End: 2023-09-14 | Stop reason: HOSPADM

## 2023-09-13 RX ORDER — INSULIN GLARGINE 100 [IU]/ML
40 INJECTION, SOLUTION SUBCUTANEOUS 2 TIMES DAILY
Status: DISCONTINUED | OUTPATIENT
Start: 2023-09-13 | End: 2023-09-13

## 2023-09-13 RX ORDER — DEXTROSE MONOHYDRATE 100 MG/ML
INJECTION, SOLUTION INTRAVENOUS CONTINUOUS PRN
Status: DISCONTINUED | OUTPATIENT
Start: 2023-09-13 | End: 2023-09-14 | Stop reason: HOSPADM

## 2023-09-13 RX ORDER — INSULIN LISPRO 100 [IU]/ML
0-16 INJECTION, SOLUTION INTRAVENOUS; SUBCUTANEOUS
Status: DISCONTINUED | OUTPATIENT
Start: 2023-09-13 | End: 2023-09-14 | Stop reason: HOSPADM

## 2023-09-13 RX ORDER — INSULIN GLARGINE 100 [IU]/ML
50 INJECTION, SOLUTION SUBCUTANEOUS 2 TIMES DAILY
Status: DISCONTINUED | OUTPATIENT
Start: 2023-09-13 | End: 2023-09-14 | Stop reason: HOSPADM

## 2023-09-13 RX ORDER — IPRATROPIUM BROMIDE AND ALBUTEROL SULFATE 2.5; .5 MG/3ML; MG/3ML
1 SOLUTION RESPIRATORY (INHALATION) EVERY 4 HOURS PRN
Status: DISCONTINUED | OUTPATIENT
Start: 2023-09-13 | End: 2023-09-14 | Stop reason: HOSPADM

## 2023-09-13 RX ADMIN — CARVEDILOL 6.25 MG: 6.25 TABLET, FILM COATED ORAL at 16:04

## 2023-09-13 RX ADMIN — INSULIN GLARGINE 40 UNITS: 100 INJECTION, SOLUTION SUBCUTANEOUS at 04:00

## 2023-09-13 RX ADMIN — ACETAMINOPHEN 650 MG: 325 TABLET ORAL at 16:03

## 2023-09-13 RX ADMIN — IPRATROPIUM BROMIDE AND ALBUTEROL SULFATE 1 DOSE: 2.5; .5 SOLUTION RESPIRATORY (INHALATION) at 07:14

## 2023-09-13 RX ADMIN — LOSARTAN POTASSIUM 50 MG: 25 TABLET, FILM COATED ORAL at 08:04

## 2023-09-13 RX ADMIN — IPRATROPIUM BROMIDE AND ALBUTEROL SULFATE 1 DOSE: 2.5; .5 SOLUTION RESPIRATORY (INHALATION) at 19:44

## 2023-09-13 RX ADMIN — ENOXAPARIN SODIUM 30 MG: 100 INJECTION SUBCUTANEOUS at 10:34

## 2023-09-13 RX ADMIN — INSULIN GLARGINE 50 UNITS: 100 INJECTION, SOLUTION SUBCUTANEOUS at 20:43

## 2023-09-13 RX ADMIN — ASPIRIN 81 MG 81 MG: 81 TABLET ORAL at 08:04

## 2023-09-13 RX ADMIN — INSULIN LISPRO 12 UNITS: 100 INJECTION, SOLUTION INTRAVENOUS; SUBCUTANEOUS at 13:05

## 2023-09-13 RX ADMIN — ROSUVASTATIN 20 MG: 10 TABLET, FILM COATED ORAL at 04:01

## 2023-09-13 RX ADMIN — INSULIN GLARGINE 50 UNITS: 100 INJECTION, SOLUTION SUBCUTANEOUS at 10:34

## 2023-09-13 RX ADMIN — INSULIN LISPRO 16 UNITS: 100 INJECTION, SOLUTION INTRAVENOUS; SUBCUTANEOUS at 18:15

## 2023-09-13 RX ADMIN — SODIUM CHLORIDE, PRESERVATIVE FREE 10 ML: 5 INJECTION INTRAVENOUS at 04:04

## 2023-09-13 RX ADMIN — PREDNISONE 40 MG: 20 TABLET ORAL at 09:18

## 2023-09-13 RX ADMIN — INSULIN LISPRO 4 UNITS: 100 INJECTION, SOLUTION INTRAVENOUS; SUBCUTANEOUS at 20:43

## 2023-09-13 RX ADMIN — QUETIAPINE FUMARATE 200 MG: 100 TABLET ORAL at 20:42

## 2023-09-13 RX ADMIN — ARIPIPRAZOLE 5 MG: 5 TABLET ORAL at 09:18

## 2023-09-13 RX ADMIN — SODIUM CHLORIDE, PRESERVATIVE FREE 10 ML: 5 INJECTION INTRAVENOUS at 09:19

## 2023-09-13 RX ADMIN — IPRATROPIUM BROMIDE AND ALBUTEROL SULFATE 1 DOSE: 2.5; .5 SOLUTION RESPIRATORY (INHALATION) at 11:22

## 2023-09-13 RX ADMIN — MELATONIN TAB 3 MG 3 MG: 3 TAB at 20:51

## 2023-09-13 RX ADMIN — QUETIAPINE FUMARATE 200 MG: 100 TABLET ORAL at 04:11

## 2023-09-13 RX ADMIN — INSULIN LISPRO 10 UNITS: 100 INJECTION, SOLUTION INTRAVENOUS; SUBCUTANEOUS at 12:22

## 2023-09-13 RX ADMIN — IPRATROPIUM BROMIDE AND ALBUTEROL SULFATE 1 DOSE: 2.5; .5 SOLUTION RESPIRATORY (INHALATION) at 16:52

## 2023-09-13 RX ADMIN — INSULIN LISPRO 10 UNITS: 100 INJECTION, SOLUTION INTRAVENOUS; SUBCUTANEOUS at 18:16

## 2023-09-13 RX ADMIN — MONTELUKAST SODIUM 10 MG: 10 TABLET, FILM COATED ORAL at 20:42

## 2023-09-13 RX ADMIN — CLOPIDOGREL BISULFATE 75 MG: 75 TABLET ORAL at 08:04

## 2023-09-13 RX ADMIN — CARVEDILOL 6.25 MG: 6.25 TABLET, FILM COATED ORAL at 08:04

## 2023-09-13 RX ADMIN — ENOXAPARIN SODIUM 30 MG: 100 INJECTION SUBCUTANEOUS at 20:42

## 2023-09-13 ASSESSMENT — LIFESTYLE VARIABLES
HOW OFTEN DO YOU HAVE A DRINK CONTAINING ALCOHOL: NEVER
HOW MANY STANDARD DRINKS CONTAINING ALCOHOL DO YOU HAVE ON A TYPICAL DAY: PATIENT DOES NOT DRINK

## 2023-09-13 ASSESSMENT — PAIN SCALES - GENERAL: PAINLEVEL_OUTOF10: 0

## 2023-09-13 NOTE — PROGRESS NOTES
Pt to unit from ED. Oriented to room. Pt independent at home w/no assistive devices per pt. Pt states she will call for help if needed. Bed locked and in lowest position. Call light in reach. Will continue to monitor.

## 2023-09-13 NOTE — PROGRESS NOTES
ED TO INPATIENT SBAR HANDOFF    Patient Name: Jesus Nguyễn   :  1950  81 y.o. MRN:  [id number]  Preferred Name  Zoe Julian  ED Room #:  ED-0011/11  Family/Caregiver Present no   Restraints no   Sitter no   Sepsis Risk Score Sepsis Risk Score: 1.07    Situation  Code Status: Full Code No additional code details. Allergies: Penicillins  Weight: Patient Vitals for the past 96 hrs (Last 3 readings):   Weight   23 1703 271 lb (122.9 kg)     Arrived from: home  Chief Complaint:   Chief Complaint   Patient presents with    Shortness of Breath     Pt complains of SOB. Pt states she was at the infusion clinic and her SPO2 dropped to 56%. Pt is on a NC at 2 LPM. At rest pt is 93%. Walking back from the lobby pt was down to 84%. Pt states she does not \"feel right in the chest and head\". Pt states she has been \"having trouble with there sugar and breathing\". Pt was recently dx with pulmonary fibrosis. Hospital Problem/Diagnosis:  Principal Problem:    Asthma exacerbation attacks  Resolved Problems:    * No resolved hospital problems. *    Imaging:   CT CHEST PULMONARY EMBOLISM W CONTRAST   Final Result   1. No pulmonary embolism. 2. The lungs are clear with no acute finding in the chest.   3. Hepatosplenomegaly partially visualized.            Abnormal labs:   Abnormal Labs Reviewed   BLOOD GAS, VENOUS - Abnormal; Notable for the following components:       Result Value    pO2, Carlos 108.0 (*)     HCO3, Venous 29.9 (*)     Base Excess, Carlos 4.9 (*)     Carboxyhemoglobin 5.9 (*)     All other components within normal limits   CBC WITH AUTO DIFFERENTIAL - Abnormal; Notable for the following components:    RBC 3.26 (*)     Hemoglobin 8.5 (*)     Hematocrit 27.7 (*)     MCHC 30.9 (*)     RDW 21.1 (*)     All other components within normal limits   BASIC METABOLIC PANEL - Abnormal; Notable for the following components:    Sodium 135 (*)     Chloride 95 (*)     Glucose 400 (*)     All other components within

## 2023-09-13 NOTE — PROGRESS NOTES
Patient seen in ED, room 11. Admission completed with the following exceptions:  4 Eyes Assessment, Immunizations, Covid Vaccines, Rights and Responsibilities, Orientation to room, Plan of Care, Education/Learning Assessment and Education Plan, white board, height and weight, pain assessment and head to toe assessment. Patient is alert and oriented X 4. Patient lives in a single story house with her sister-in-law and is being admitted for asthma exacerbation. Home Medication reconciliation was completed by [employer]. Plan of care updated if indicated. All questions answered. Patient verbalized need for a nebulizer and portable concentrator at home.

## 2023-09-13 NOTE — PROGRESS NOTES
Pharmacy Home Medication Reconciliation Note    A medication reconciliation has been completed for Patience Bansal 1950    Pharmacy: [address] [place] [address], [address]  Information provided by: patient    The patient's home medication list is as follows:  Current Facility-Administered Medications on File Prior to Encounter   Medication Dose Route Frequency Provider Last Rate Last Admin    [COMPLETED] 0.9 % sodium chloride infusion  5-250 mL/hr IntraVENous Once Malva Morelle, APRN - CNS   Stopped at 09/12/23 1447    [COMPLETED] iron sucrose (VENOFER) 200 mg in sodium chloride 0.9% 100 mL IVPB  200 mg IntraVENous Once Malva Morelle, APRN - CNS   Stopped at 09/12/23 1440    [COMPLETED] sodium chloride flush 0.9 % injection 5-40 mL  5-40 mL IntraVENous Once Malva Morelle, APRN - CNS   10 mL at 09/12/23 1408     Current Outpatient Medications on File Prior to Encounter   Medication Sig Dispense Refill    melatonin 3 MG TABS tablet Take 1 tablet by mouth nightly as needed      tiotropium (SPIRIVA RESPIMAT) 1.25 MCG/ACT AERS inhaler Inhale 2 puffs into the lungs daily      traZODone (DESYREL) 50 MG tablet Take 1 tablet by mouth nightly for 2 doses 2 tablet 0    rosuvastatin (CRESTOR) 40 MG tablet Take 1 tablet by mouth nightly (Patient taking differently: Take 0.5 tablets by mouth nightly) 90 tablet 3    losartan (COZAAR) 50 MG tablet Take 1 tablet by mouth daily 90 tablet 3    carvedilol (COREG) 6.25 MG tablet Take 1 tablet by mouth 2 times daily (with meals) 180 tablet 3    clopidogrel (PLAVIX) 75 MG tablet Take 1 tablet by mouth daily 90 tablet 3    docusate sodium (COLACE) 100 MG capsule Take 1 capsule by mouth daily as needed for Constipation      OXYGEN Inhale 2 L into the lungs continuous      furosemide (LASIX) 40 MG tablet Take 1.5 tablets by mouth daily (Patient taking differently: Take 2 tablets by mouth daily Take 2 tablets 9/11-9/13 then 1.5 tablets every day

## 2023-09-13 NOTE — PROGRESS NOTES
4 Eyes Skin Assessment     NAME:  Martin Cr  YOB: 1950  MEDICAL RECORD NUMBER:  [id number]    The patient is being assessed for  Admission    I agree that at least one RN has performed a thorough Head to Toe Skin Assessment on the patient. ALL assessment sites listed below have been assessed. Areas assessed by both nurses:    Head, Face, Ears, Shoulders, Back, Chest, Arms, Elbows, Hands, Sacrum. Buttock, Coccyx, Ischium, Legs. Feet and Heels, and Under Medical Devices         Does the Patient have a Wound?  No noted wound(s)       Gene Prevention initiated by RN: No  Wound Care Orders initiated by RN: No    Pressure Injury (Stage 3,4, Unstageable, DTI, NWPT, and Complex wounds) if present, place Wound referral order by RN under : No    New Ostomies, if present place, Ostomy referral order under : No     Nurse 1 eSignature: Electronically signed by Dipesh Peres RN on 9/13/2023 at 4:08 PM     **SHARE this note so that the co-signing nurse can place an eSignature**    Nurse 2 eSignature: Electronically signed by Nell Newman RN on 9/13/23 at 4:09 PM EDT

## 2023-09-14 VITALS
HEART RATE: 94 BPM | OXYGEN SATURATION: 92 % | TEMPERATURE: 97.9 F | HEIGHT: 61 IN | SYSTOLIC BLOOD PRESSURE: 131 MMHG | RESPIRATION RATE: 18 BRPM | DIASTOLIC BLOOD PRESSURE: 76 MMHG | BODY MASS INDEX: 50.84 KG/M2 | WEIGHT: 269.3 LBS

## 2023-09-14 LAB
GLUCOSE BLD-MCNC: 234 MG/DL (ref 70–99)
GLUCOSE BLD-MCNC: 246 MG/DL (ref 70–99)
GLUCOSE BLD-MCNC: 308 MG/DL (ref 70–99)
PERFORMED ON: ABNORMAL

## 2023-09-14 PROCEDURE — 6360000002 HC RX W HCPCS: Performed by: INTERNAL MEDICINE

## 2023-09-14 PROCEDURE — 6370000000 HC RX 637 (ALT 250 FOR IP): Performed by: INTERNAL MEDICINE

## 2023-09-14 PROCEDURE — 94640 AIRWAY INHALATION TREATMENT: CPT

## 2023-09-14 PROCEDURE — 6370000000 HC RX 637 (ALT 250 FOR IP): Performed by: HOSPITALIST

## 2023-09-14 PROCEDURE — 94761 N-INVAS EAR/PLS OXIMETRY MLT: CPT

## 2023-09-14 PROCEDURE — 2700000000 HC OXYGEN THERAPY PER DAY

## 2023-09-14 PROCEDURE — 2580000003 HC RX 258: Performed by: INTERNAL MEDICINE

## 2023-09-14 RX ORDER — PREDNISONE 20 MG/1
TABLET ORAL
Qty: 10 TABLET | Refills: 0 | Status: SHIPPED | OUTPATIENT
Start: 2023-09-15 | End: 2023-09-23

## 2023-09-14 RX ORDER — FUROSEMIDE 40 MG/1
80 TABLET ORAL DAILY
Qty: 60 TABLET | Refills: 0 | Status: SHIPPED | OUTPATIENT
Start: 2023-09-14 | End: 2023-09-14 | Stop reason: SDUPTHER

## 2023-09-14 RX ORDER — FUROSEMIDE 40 MG/1
80 TABLET ORAL DAILY
Qty: 60 TABLET | Refills: 0 | Status: SHIPPED | OUTPATIENT
Start: 2023-09-14

## 2023-09-14 RX ADMIN — INSULIN LISPRO 10 UNITS: 100 INJECTION, SOLUTION INTRAVENOUS; SUBCUTANEOUS at 12:40

## 2023-09-14 RX ADMIN — INSULIN LISPRO 4 UNITS: 100 INJECTION, SOLUTION INTRAVENOUS; SUBCUTANEOUS at 08:36

## 2023-09-14 RX ADMIN — INSULIN LISPRO 10 UNITS: 100 INJECTION, SOLUTION INTRAVENOUS; SUBCUTANEOUS at 08:35

## 2023-09-14 RX ADMIN — SODIUM CHLORIDE, PRESERVATIVE FREE 10 ML: 5 INJECTION INTRAVENOUS at 08:37

## 2023-09-14 RX ADMIN — PREDNISONE 40 MG: 20 TABLET ORAL at 08:34

## 2023-09-14 RX ADMIN — INSULIN GLARGINE 50 UNITS: 100 INJECTION, SOLUTION SUBCUTANEOUS at 08:37

## 2023-09-14 RX ADMIN — QUETIAPINE FUMARATE 100 MG: 100 TABLET ORAL at 08:34

## 2023-09-14 RX ADMIN — LOSARTAN POTASSIUM 50 MG: 25 TABLET, FILM COATED ORAL at 08:33

## 2023-09-14 RX ADMIN — CARVEDILOL 6.25 MG: 6.25 TABLET, FILM COATED ORAL at 08:34

## 2023-09-14 RX ADMIN — IPRATROPIUM BROMIDE AND ALBUTEROL SULFATE 1 DOSE: 2.5; .5 SOLUTION RESPIRATORY (INHALATION) at 07:21

## 2023-09-14 RX ADMIN — ENOXAPARIN SODIUM 30 MG: 100 INJECTION SUBCUTANEOUS at 08:34

## 2023-09-14 RX ADMIN — ASPIRIN 81 MG 81 MG: 81 TABLET ORAL at 08:34

## 2023-09-14 RX ADMIN — CLOPIDOGREL BISULFATE 75 MG: 75 TABLET ORAL at 08:34

## 2023-09-14 RX ADMIN — ACETAMINOPHEN 650 MG: 325 TABLET ORAL at 06:30

## 2023-09-14 RX ADMIN — IPRATROPIUM BROMIDE AND ALBUTEROL SULFATE 1 DOSE: 2.5; .5 SOLUTION RESPIRATORY (INHALATION) at 11:20

## 2023-09-14 RX ADMIN — ARIPIPRAZOLE 5 MG: 5 TABLET ORAL at 08:34

## 2023-09-14 RX ADMIN — INSULIN LISPRO 12 UNITS: 100 INJECTION, SOLUTION INTRAVENOUS; SUBCUTANEOUS at 12:41

## 2023-09-14 NOTE — PLAN OF CARE
Problem: Safety - Adult  Goal: Free from fall injury  9/14/2023 1204 by Felecia Min RN  Outcome: Adequate for Discharge  9/14/2023 0141 by Adriana Chavez RN  Outcome: Progressing  9/14/2023 0139 by Adriana Chavez RN  Outcome: Progressing

## 2023-09-14 NOTE — PROGRESS NOTES
Patient remains alert and oriented with vss. AM meds given at this time and AM assessment completed. Patient denies pain at this time. Patient sitting up in chair, eating breakfast. Insulin given for BG per orders. Chair alarm is on and call light is in reach.

## 2023-09-14 NOTE — CARE COORDINATION
09/14/23 1239   IMM Letter   IMM Letter given to Patient/Family/Significant other/Guardian/POA/by:  Regulo Hays explained to the patient the IMM. The patient stated she was in agreement with discharge and signed the IMM. The patient stated she was agreeable to receiving the IMM within four hours of discharge.    IMM Letter date given: 09/14/23   IMM Letter time given: 1237           Electronically signed by JOHN Whipple on 9/14/2023 at 12:40 PM

## 2023-09-14 NOTE — PROGRESS NOTES
CLINICAL PHARMACY NOTE: MEDS TO BEDS    Total # of Prescriptions Filled: 1   The following medications were delivered to the patient:  PREDNISONE 20MG    Additional Documentation:  Delivered to patients room = signed  Apple Syed to be delivered per NIRMAL [id number] [address] [facility].

## 2023-09-14 NOTE — CARE COORDINATION
Discharge Planning:     (CM) reviewed the patient's chart to assess needs. Patient's Readmission Risk Score is 20%. Patient needs a DCPA. Patient's medical insurance is [email or web address] and [employer]. Patient's PCP is [facility]. CM team to follow. Staff to inform CM if additional discharge needs arise. Pt is from home.           Electronically signed by JOHN Atkinson on 9/14/2023 at 9:23 AM

## 2023-09-14 NOTE — PROGRESS NOTES
Discharge instructions, medications, side effects and follow ups reviewed at bedside with patient. Iv dc'd. All questions and concerns answered at this time. Patient has portable tank of oxygen she will travel home with. Vitals stable at time of discharge.

## 2023-09-14 NOTE — DISCHARGE SUMMARY
Discharge Summary    Name:  Ana Gonzalez /Age/Sex: 1950  (81 y.o. female)   MRN & CSN:  [id number] & [id number] Admission Date/Time: 2023  4:58 PM   Attending:  Vergie Fleischer, MD Discharging Physician: Vergie Fleischer, MD     Hospital Course:   Ana Gonzalez is a 81 y.o.  female  who presents with shortness of breath, found to have acute COPD exacerbation with wheezing, was started on bronchodilators, IV steroids, responded very well to treatment, shortness of breath has resolved, oxygen requirement at baseline 2 L, wheezing has resolved. Acute COPD exacerbation, wheezing has resolved, shortness of breath has resolved, taper p.o. steroids outpatient, respiratory panel negative. Acute hypoxic respiratory failure, oxygen requirement at baseline 2 L  Obstructive sleep apnea on CPAP at nighttime  Chronic diastolic heart failure, no acute exacerbation    The patient expressed appropriate understanding of and agreement with the discharge recommendations, medications, and plan. Discharge Instruction:     Follow up appointments: Pulmonary and PCP    Curt Odonnell MD  [address] [place] [address]  [phone]    Follow up in 1 week(s)        Primary care physician:  within 2 weeks    Diet:  diabetic diet     Activity: activity as tolerated    Disposition: Discharged to:   [x]Home, []C, []SNF, []Acute Rehab, []Hospice     Condition on discharge: Stable    Discharge Medications:        Medication List        START taking these medications      predniSONE 20 MG tablet  Commonly known as: DELTASONE  Take 2 tablets by mouth daily for 2 days, THEN 1.5 tablets daily for 2 days, THEN 1 tablet daily for 2 days, THEN 0.5 tablets daily for 2 days.   Start taking on: September 15, 2023            CHANGE how you take these medications      rosuvastatin 40 MG tablet  Commonly known as: CRESTOR  Take 1 tablet by mouth nightly  What changed: how much to take     Spiriva Respimat 1.25 MCG/ACT

## 2023-09-18 NOTE — PROGRESS NOTES
Physician Progress Note      Almas Jaramillo  [facility] #:                  [id number]  :                       1950  ADMIT DATE:       2023 4:58 PM  [address] [facility] DATE:        2023 1:21 PM  RESPONDING  PROVIDER #:        Rosmery Willis MD          QUERY TEXT:    Patient admitted with Acute COPD exacerbation. Noted documentation of acute   respiratory failure in H&P, IM PN . In order to support the diagnosis of   acute respiratory failure, please include additional clinical indicators in   your documentation. Or please document if the diagnosis of acute respiratory   failure has been ruled out after further study. The medical record reflects the following:  Risk Factors: Acute COPD exacerbation  Clinical Indicators: Per IM PN  \"Acute hypoxic respiratory failure with   dyspnea, on 2 L of oxygen, incentive spirometry breathing, wean off oxygen as   tolerated\", EPIC-Expiratory wheezing, SPO2 91-97, RR 16-24  Treatment: 2L NC, CT, IS,    Acute Respiratory Failure Clinical Indicators per 3M MS-DRG Training Guide and   Quick Reference Guide:  pO2 < 60 mmHg or SpO2 (pulse oximetry) < 91% breathing room air  pCO2 > 50 and pH < 7.35  P/F ratio (pO2 / FIO2) < 300  pO2 decrease or pCO2 increase by 10 mmHg from baseline (if known)  Supplemental oxygen of 40% or more  Presence of respiratory distress, tachypnea, dyspnea, shortness of breath,   wheezing  Unable to speak in complete sentences  Use of accessory muscles to breathe  Extreme anxiety and feeling of impending doom  Tripod position  Confusion/altered mental status/obtunded  Options provided:  -- Acute Respiratory Failure as evidenced by, Please document evidence.   -- Acute Respiratory Failure ruled out after study  -- Other - I will add my own diagnosis  -- Disagree - Not applicable / Not valid  -- Disagree - Clinically unable to determine / Unknown  -- Refer to Clinical Documentation Reviewer    PROVIDER RESPONSE TEXT:    Acute

## 2023-09-19 ENCOUNTER — HOSPITAL ENCOUNTER (OUTPATIENT)
Dept: ONCOLOGY | Age: 73
Setting detail: INFUSION SERIES
Discharge: HOME | End: 2023-09-19
Payer: MEDICARE

## 2023-09-19 VITALS
TEMPERATURE: 97.8 F | HEART RATE: 88 BPM | DIASTOLIC BLOOD PRESSURE: 77 MMHG | OXYGEN SATURATION: 98 % | SYSTOLIC BLOOD PRESSURE: 138 MMHG | RESPIRATION RATE: 16 BRPM

## 2023-09-19 DIAGNOSIS — D64.9 ANEMIA, UNSPECIFIED TYPE: Primary | ICD-10-CM

## 2023-09-19 DIAGNOSIS — D50.9 IRON DEFICIENCY ANEMIA, UNSPECIFIED IRON DEFICIENCY ANEMIA TYPE: ICD-10-CM

## 2023-09-19 PROCEDURE — 2580000003 HC RX 258: Performed by: CLINICAL NURSE SPECIALIST

## 2023-09-19 PROCEDURE — 6360000002 HC RX W HCPCS: Performed by: CLINICAL NURSE SPECIALIST

## 2023-09-19 PROCEDURE — 96365 THER/PROPH/DIAG IV INF INIT: CPT

## 2023-09-19 PROCEDURE — 99211 OFF/OP EST MAY X REQ PHY/QHP: CPT

## 2023-09-19 RX ORDER — SODIUM CHLORIDE 0.9 % (FLUSH) 0.9 %
5-40 SYRINGE (ML) INJECTION ONCE
Status: COMPLETED | OUTPATIENT
Start: 2023-09-19 | End: 2023-09-19

## 2023-09-19 RX ORDER — SODIUM CHLORIDE 9 MG/ML
5-250 INJECTION, SOLUTION INTRAVENOUS ONCE
Status: CANCELLED | OUTPATIENT
Start: 2023-09-19 | End: 2023-09-19

## 2023-09-19 RX ORDER — SODIUM CHLORIDE 0.9 % (FLUSH) 0.9 %
5-40 SYRINGE (ML) INJECTION ONCE
Status: CANCELLED | OUTPATIENT
Start: 2023-09-19 | End: 2023-09-19

## 2023-09-19 RX ORDER — SODIUM CHLORIDE 9 MG/ML
5-250 INJECTION, SOLUTION INTRAVENOUS ONCE
Status: COMPLETED | OUTPATIENT
Start: 2023-09-19 | End: 2023-09-19

## 2023-09-19 RX ADMIN — Medication 10 ML: at 14:07

## 2023-09-19 RX ADMIN — SODIUM CHLORIDE 200 ML/HR: 9 INJECTION, SOLUTION INTRAVENOUS at 14:08

## 2023-09-19 RX ADMIN — Medication 200 MG: at 14:09

## 2023-09-19 NOTE — PROGRESS NOTES
Patient ambulatory to department for fifth and final dose of venofer. Tolerated venofer infusion well with no adverse rx noted. Denied need for printed AVS. Treatment completed to follow up with cardiology.

## 2023-11-08 ENCOUNTER — HOSPITAL ENCOUNTER (OUTPATIENT)
Age: 73
Discharge: HOME | End: 2023-11-08
Payer: MEDICARE

## 2023-11-08 ENCOUNTER — OFFICE VISIT (OUTPATIENT)
Dept: CARDIOLOGY CLINIC | Age: 73
End: 2023-11-08
Payer: MEDICARE

## 2023-11-08 VITALS
HEART RATE: 99 BPM | BODY MASS INDEX: 47.58 KG/M2 | HEIGHT: 61 IN | WEIGHT: 252 LBS | DIASTOLIC BLOOD PRESSURE: 60 MMHG | SYSTOLIC BLOOD PRESSURE: 130 MMHG | OXYGEN SATURATION: 96 %

## 2023-11-08 DIAGNOSIS — I50.32 CHRONIC DIASTOLIC HEART FAILURE (HCC): ICD-10-CM

## 2023-11-08 DIAGNOSIS — I50.32 CHRONIC DIASTOLIC HEART FAILURE (HCC): Primary | ICD-10-CM

## 2023-11-08 DIAGNOSIS — I10 PRIMARY HYPERTENSION: ICD-10-CM

## 2023-11-08 DIAGNOSIS — E66.01 MORBID OBESITY WITH BMI OF 50.0-59.9, ADULT (HCC): ICD-10-CM

## 2023-11-08 DIAGNOSIS — E08.65 DIABETES MELLITUS DUE TO UNDERLYING CONDITION, UNCONTROLLED, WITH HYPERGLYCEMIA (HCC): ICD-10-CM

## 2023-11-08 DIAGNOSIS — G47.33 OSA (OBSTRUCTIVE SLEEP APNEA): ICD-10-CM

## 2023-11-08 DIAGNOSIS — I25.84 CORONARY ARTERY DISEASE DUE TO CALCIFIED CORONARY LESION: ICD-10-CM

## 2023-11-08 DIAGNOSIS — I25.10 CORONARY ARTERY DISEASE DUE TO CALCIFIED CORONARY LESION: ICD-10-CM

## 2023-11-08 LAB
ANION GAP SERPL CALCULATED.3IONS-SCNC: 12 MMOL/L (ref 3–16)
BUN SERPL-MCNC: 13 MG/DL (ref 7–20)
CALCIUM SERPL-MCNC: 9.6 MG/DL (ref 8.3–10.6)
CHLORIDE SERPL-SCNC: 94 MMOL/L (ref 99–110)
CO2 SERPL-SCNC: 31 MMOL/L (ref 21–32)
CREAT SERPL-MCNC: 0.7 MG/DL (ref 0.6–1.2)
DEPRECATED RDW RBC AUTO: 19.7 % (ref 12.4–15.4)
GFR SERPLBLD CREATININE-BSD FMLA CKD-EPI: >60 ML/MIN/{1.73_M2}
GLUCOSE SERPL-MCNC: 262 MG/DL (ref 70–99)
HCT VFR BLD AUTO: 28.9 % (ref 36–48)
HGB BLD-MCNC: 8.7 G/DL (ref 12–16)
MCH RBC QN AUTO: 22.8 PG (ref 26–34)
MCHC RBC AUTO-ENTMCNC: 30.2 G/DL (ref 31–36)
MCV RBC AUTO: 75.3 FL (ref 80–100)
NT-PROBNP SERPL-MCNC: <36 PG/ML (ref 0–124)
PLATELET # BLD AUTO: 203 K/UL (ref 135–450)
PMV BLD AUTO: 9.6 FL (ref 5–10.5)
POTASSIUM SERPL-SCNC: 4.3 MMOL/L (ref 3.5–5.1)
RBC # BLD AUTO: 3.84 M/UL (ref 4–5.2)
SODIUM SERPL-SCNC: 137 MMOL/L (ref 136–145)
WBC # BLD AUTO: 7.6 K/UL (ref 4–11)

## 2023-11-08 PROCEDURE — 3078F DIAST BP <80 MM HG: CPT | Performed by: CLINICAL NURSE SPECIALIST

## 2023-11-08 PROCEDURE — G8484 FLU IMMUNIZE NO ADMIN: HCPCS | Performed by: CLINICAL NURSE SPECIALIST

## 2023-11-08 PROCEDURE — 36415 COLL VENOUS BLD VENIPUNCTURE: CPT

## 2023-11-08 PROCEDURE — 1036F TOBACCO NON-USER: CPT | Performed by: CLINICAL NURSE SPECIALIST

## 2023-11-08 PROCEDURE — 80048 BASIC METABOLIC PNL TOTAL CA: CPT

## 2023-11-08 PROCEDURE — G8427 DOCREV CUR MEDS BY ELIG CLIN: HCPCS | Performed by: CLINICAL NURSE SPECIALIST

## 2023-11-08 PROCEDURE — G8400 PT W/DXA NO RESULTS DOC: HCPCS | Performed by: CLINICAL NURSE SPECIALIST

## 2023-11-08 PROCEDURE — 83880 ASSAY OF NATRIURETIC PEPTIDE: CPT

## 2023-11-08 PROCEDURE — 3017F COLORECTAL CA SCREEN DOC REV: CPT | Performed by: CLINICAL NURSE SPECIALIST

## 2023-11-08 PROCEDURE — 99214 OFFICE O/P EST MOD 30 MIN: CPT | Performed by: CLINICAL NURSE SPECIALIST

## 2023-11-08 PROCEDURE — 3075F SYST BP GE 130 - 139MM HG: CPT | Performed by: CLINICAL NURSE SPECIALIST

## 2023-11-08 PROCEDURE — G8417 CALC BMI ABV UP PARAM F/U: HCPCS | Performed by: CLINICAL NURSE SPECIALIST

## 2023-11-08 PROCEDURE — 1123F ACP DISCUSS/DSCN MKR DOCD: CPT | Performed by: CLINICAL NURSE SPECIALIST

## 2023-11-08 PROCEDURE — 1090F PRES/ABSN URINE INCON ASSESS: CPT | Performed by: CLINICAL NURSE SPECIALIST

## 2023-11-08 PROCEDURE — 85027 COMPLETE CBC AUTOMATED: CPT

## 2023-11-08 RX ORDER — LOSARTAN POTASSIUM 50 MG/1
50 TABLET ORAL DAILY
Qty: 90 TABLET | Refills: 1 | Status: SHIPPED | OUTPATIENT
Start: 2023-11-08

## 2023-11-08 NOTE — PATIENT INSTRUCTIONS
Resume losartan 50 mg once a day  Call your PCP about blood sugars 300-600  Check blood work today  Continue all other medications  Schedule the sleep study  RTO in

## 2023-11-08 NOTE — PROGRESS NOTES
[facility]  Progress Note    Primary Care Doctor:  Huang Mead MD    Chief Complaint   Patient presents with    Follow-up    Congestive Heart Failure        History of Present Illness:  81 y.o. female with history of hypertension, diastolic heart failure, hyperlipidemia, diabetes, prior history of PE, morbid obesity and krishna on oxygen at night and to wear during the day. Breast cancer with chemo  3/20-27/23 for shortness of breath, diuresed with IV lasix, UTI (cipro). She was hospitalized at the end of April with chest pain. She was not a candidate for [facility] at the time d/t renal impairment (hyperkalemia). (Per Dr Candace Viveros note)  PCI with Dr Candace Viveros 6/14-15/2023 to     I had the pleasure of seeing Will White in follow up for diastolic heart failure. She is ambulatory with her oxygen at 2 L and her daughter. She has been in the ER/admitted 4 times since I saw her last.  She tells me her blood sugar this week was 600 and now 300. She has been drinking more fluids to help with this. She is not on losartan and not sure why (stopped in sept and not sure reason). Her weight has gone from 271->252 most likely due to increased sugars. She is taking all her medications. Past Medical History:   has a past medical history of Asthma, Back pain, Bipolar 1 disorder ([address]), CAD (coronary artery disease), Cancer ([address]), Cataract, CHF (congestive heart failure) ([address]), Cirrhosis ([address]), COPD (chronic obstructive pulmonary disease) ([address]), Depression, Diabetes mellitus ([address]), GERD (gastroesophageal reflux disease), Hx of blood clots, Hypercholesteremia, Hyperlipidemia, Hypertension, Morbid obesity with body mass index (BMI) greater than or equal to 50 ([address]), Osteoporosis, Pancytopenia ([address]), Right rib fracture, RSV bronchitis, Sciatica, and Tachycardia. Surgical History:   has a past surgical history that includes Cholecystectomy; Leg Surgery; Appendectomy;  Endometrial ablation; Breast surgery (Left,

## 2023-11-09 ENCOUNTER — TELEPHONE (OUTPATIENT)
Dept: CARDIOLOGY CLINIC | Age: 73
End: 2023-11-09

## 2023-11-09 DIAGNOSIS — I50.32 CHRONIC DIASTOLIC HEART FAILURE (HCC): Primary | ICD-10-CM

## 2023-11-09 DIAGNOSIS — I50.32 CHRONIC DIASTOLIC HEART FAILURE (HCC): ICD-10-CM

## 2023-11-09 LAB
FERRITIN SERPL IA-MCNC: 12.4 NG/ML (ref 15–150)
IRON SATN MFR SERPL: 5 % (ref 15–50)
IRON SERPL-MCNC: 26 UG/DL (ref 37–145)
TIBC SERPL-MCNC: 487 UG/DL (ref 260–445)

## 2023-11-09 NOTE — TELEPHONE ENCOUNTER
----- Message from SONDRA Braun CNP sent at 11/9/2023  9:39 AM EST -----  Labs ok except she is still anemic. Did she ever get colonoscopy? Also please see if lab can add on iron studies - orders are in. Last check in Aug. ANA    Spoke to the lab they will add the iron studies. Results are in.

## 2023-11-10 ENCOUNTER — TELEPHONE (OUTPATIENT)
Dept: CARDIOLOGY CLINIC | Age: 73
End: 2023-11-10

## 2023-11-10 NOTE — TELEPHONE ENCOUNTER
----- Message from SONDRA Braun CNP sent at 11/9/2023  2:54 PM EST -----  Iron is low, is she agreeable to infusions? ANA    Spoke to patient she is agreeable to iron infusions

## 2023-11-13 RX ORDER — SODIUM CHLORIDE 0.9 % (FLUSH) 0.9 %
5-40 SYRINGE (ML) INJECTION PRN
OUTPATIENT
Start: 2023-11-13

## 2023-11-13 RX ORDER — SODIUM CHLORIDE 9 MG/ML
5-250 INJECTION, SOLUTION INTRAVENOUS PRN
OUTPATIENT
Start: 2023-11-13

## 2023-11-15 ENCOUNTER — CLINICAL DOCUMENTATION (OUTPATIENT)
Dept: ONCOLOGY | Age: 73
End: 2023-11-15

## 2023-11-21 ENCOUNTER — HOSPITAL ENCOUNTER (OUTPATIENT)
Dept: ONCOLOGY | Age: 73
Setting detail: INFUSION SERIES
Discharge: HOME | End: 2023-11-21
Payer: MEDICARE

## 2023-11-21 VITALS
HEART RATE: 84 BPM | SYSTOLIC BLOOD PRESSURE: 111 MMHG | TEMPERATURE: 96.9 F | DIASTOLIC BLOOD PRESSURE: 56 MMHG | RESPIRATION RATE: 16 BRPM

## 2023-11-21 DIAGNOSIS — D64.9 ANEMIA, UNSPECIFIED TYPE: Primary | ICD-10-CM

## 2023-11-21 DIAGNOSIS — D50.9 IRON DEFICIENCY ANEMIA, UNSPECIFIED IRON DEFICIENCY ANEMIA TYPE: ICD-10-CM

## 2023-11-21 PROCEDURE — 96365 THER/PROPH/DIAG IV INF INIT: CPT

## 2023-11-21 PROCEDURE — 99211 OFF/OP EST MAY X REQ PHY/QHP: CPT

## 2023-11-21 PROCEDURE — 6360000002 HC RX W HCPCS: Performed by: NURSE PRACTITIONER

## 2023-11-21 PROCEDURE — 2580000003 HC RX 258: Performed by: NURSE PRACTITIONER

## 2023-11-21 RX ORDER — SODIUM CHLORIDE 9 MG/ML
5-250 INJECTION, SOLUTION INTRAVENOUS PRN
Status: CANCELLED | OUTPATIENT
Start: 2023-11-28

## 2023-11-21 RX ORDER — SODIUM CHLORIDE 0.9 % (FLUSH) 0.9 %
5-40 SYRINGE (ML) INJECTION PRN
Status: DISCONTINUED | OUTPATIENT
Start: 2023-11-21 | End: 2023-11-22 | Stop reason: HOSPADM

## 2023-11-21 RX ORDER — SODIUM CHLORIDE 0.9 % (FLUSH) 0.9 %
5-40 SYRINGE (ML) INJECTION PRN
Status: CANCELLED | OUTPATIENT
Start: 2023-11-28

## 2023-11-21 RX ORDER — SODIUM CHLORIDE 9 MG/ML
5-250 INJECTION, SOLUTION INTRAVENOUS PRN
Status: DISCONTINUED | OUTPATIENT
Start: 2023-11-21 | End: 2023-11-22 | Stop reason: HOSPADM

## 2023-11-21 RX ADMIN — Medication 200 MG: at 14:15

## 2023-11-21 RX ADMIN — Medication 10 ML: at 14:14

## 2023-11-21 RX ADMIN — SODIUM CHLORIDE 220 ML/HR: 9 INJECTION, SOLUTION INTRAVENOUS at 14:14

## 2023-11-28 ENCOUNTER — OFFICE VISIT (OUTPATIENT)
Dept: ENDOCRINOLOGY | Age: 73
End: 2023-11-28
Payer: MEDICARE

## 2023-11-28 ENCOUNTER — HOSPITAL ENCOUNTER (OUTPATIENT)
Dept: ONCOLOGY | Age: 73
Setting detail: INFUSION SERIES
Discharge: HOME | End: 2023-11-28
Payer: MEDICARE

## 2023-11-28 VITALS
HEART RATE: 86 BPM | DIASTOLIC BLOOD PRESSURE: 61 MMHG | OXYGEN SATURATION: 96 % | SYSTOLIC BLOOD PRESSURE: 114 MMHG | TEMPERATURE: 97.4 F | RESPIRATION RATE: 18 BRPM

## 2023-11-28 VITALS
DIASTOLIC BLOOD PRESSURE: 55 MMHG | HEART RATE: 80 BPM | TEMPERATURE: 98 F | RESPIRATION RATE: 14 BRPM | BODY MASS INDEX: 48.71 KG/M2 | SYSTOLIC BLOOD PRESSURE: 110 MMHG | HEIGHT: 61 IN | WEIGHT: 258 LBS

## 2023-11-28 DIAGNOSIS — D64.9 ANEMIA, UNSPECIFIED TYPE: Primary | ICD-10-CM

## 2023-11-28 DIAGNOSIS — J96.21 ACUTE ON CHRONIC RESPIRATORY FAILURE WITH HYPOXIA (HCC): ICD-10-CM

## 2023-11-28 DIAGNOSIS — I25.10 CAD IN NATIVE ARTERY: ICD-10-CM

## 2023-11-28 DIAGNOSIS — I50.9 ACUTE ON CHRONIC CONGESTIVE HEART FAILURE, UNSPECIFIED HEART FAILURE TYPE (HCC): ICD-10-CM

## 2023-11-28 DIAGNOSIS — E11.8 POORLY CONTROLLED TYPE 2 DIABETES MELLITUS WITH COMPLICATION (HCC): Primary | ICD-10-CM

## 2023-11-28 DIAGNOSIS — D50.9 IRON DEFICIENCY ANEMIA, UNSPECIFIED IRON DEFICIENCY ANEMIA TYPE: ICD-10-CM

## 2023-11-28 DIAGNOSIS — E11.65 POORLY CONTROLLED TYPE 2 DIABETES MELLITUS WITH COMPLICATION (HCC): Primary | ICD-10-CM

## 2023-11-28 PROBLEM — E11.9 DIABETES MELLITUS TYPE 2, UNCOMPLICATED, ON LONG TERM INSULIN PUMP (HCC): Status: RESOLVED | Noted: 2018-06-02 | Resolved: 2023-11-28

## 2023-11-28 PROBLEM — Z96.41 DIABETES MELLITUS TYPE 2, UNCOMPLICATED, ON LONG TERM INSULIN PUMP (HCC): Status: RESOLVED | Noted: 2018-06-02 | Resolved: 2023-11-28

## 2023-11-28 PROCEDURE — 96365 THER/PROPH/DIAG IV INF INIT: CPT

## 2023-11-28 PROCEDURE — 1090F PRES/ABSN URINE INCON ASSESS: CPT | Performed by: INTERNAL MEDICINE

## 2023-11-28 PROCEDURE — 3078F DIAST BP <80 MM HG: CPT | Performed by: INTERNAL MEDICINE

## 2023-11-28 PROCEDURE — 6360000002 HC RX W HCPCS: Performed by: NURSE PRACTITIONER

## 2023-11-28 PROCEDURE — 1123F ACP DISCUSS/DSCN MKR DOCD: CPT | Performed by: INTERNAL MEDICINE

## 2023-11-28 PROCEDURE — G8417 CALC BMI ABV UP PARAM F/U: HCPCS | Performed by: INTERNAL MEDICINE

## 2023-11-28 PROCEDURE — G8427 DOCREV CUR MEDS BY ELIG CLIN: HCPCS | Performed by: INTERNAL MEDICINE

## 2023-11-28 PROCEDURE — G8400 PT W/DXA NO RESULTS DOC: HCPCS | Performed by: INTERNAL MEDICINE

## 2023-11-28 PROCEDURE — 1036F TOBACCO NON-USER: CPT | Performed by: INTERNAL MEDICINE

## 2023-11-28 PROCEDURE — 99204 OFFICE O/P NEW MOD 45 MIN: CPT | Performed by: INTERNAL MEDICINE

## 2023-11-28 PROCEDURE — 2022F DILAT RTA XM EVC RTNOPTHY: CPT | Performed by: INTERNAL MEDICINE

## 2023-11-28 PROCEDURE — 3051F HG A1C>EQUAL 7.0%<8.0%: CPT | Performed by: INTERNAL MEDICINE

## 2023-11-28 PROCEDURE — 3074F SYST BP LT 130 MM HG: CPT | Performed by: INTERNAL MEDICINE

## 2023-11-28 PROCEDURE — G8484 FLU IMMUNIZE NO ADMIN: HCPCS | Performed by: INTERNAL MEDICINE

## 2023-11-28 PROCEDURE — 3017F COLORECTAL CA SCREEN DOC REV: CPT | Performed by: INTERNAL MEDICINE

## 2023-11-28 PROCEDURE — 99211 OFF/OP EST MAY X REQ PHY/QHP: CPT

## 2023-11-28 PROCEDURE — 2580000003 HC RX 258: Performed by: NURSE PRACTITIONER

## 2023-11-28 RX ORDER — SODIUM CHLORIDE 9 MG/ML
5-250 INJECTION, SOLUTION INTRAVENOUS PRN
Status: DISCONTINUED | OUTPATIENT
Start: 2023-11-28 | End: 2023-11-29 | Stop reason: HOSPADM

## 2023-11-28 RX ORDER — TIRZEPATIDE 7.5 MG/.5ML
7.5 INJECTION, SOLUTION SUBCUTANEOUS WEEKLY
Qty: 4 ADJUSTABLE DOSE PRE-FILLED PEN SYRINGE | Refills: 4 | Status: SHIPPED | OUTPATIENT
Start: 2023-11-28 | End: 2023-11-28 | Stop reason: SDUPTHER

## 2023-11-28 RX ORDER — TIRZEPATIDE 7.5 MG/.5ML
7.5 INJECTION, SOLUTION SUBCUTANEOUS WEEKLY
Qty: 4 ADJUSTABLE DOSE PRE-FILLED PEN SYRINGE | Refills: 4 | Status: SHIPPED | OUTPATIENT
Start: 2023-11-28

## 2023-11-28 RX ORDER — SODIUM CHLORIDE 0.9 % (FLUSH) 0.9 %
5-40 SYRINGE (ML) INJECTION PRN
Status: DISCONTINUED | OUTPATIENT
Start: 2023-11-28 | End: 2023-11-29 | Stop reason: HOSPADM

## 2023-11-28 RX ORDER — SODIUM CHLORIDE 0.9 % (FLUSH) 0.9 %
5-40 SYRINGE (ML) INJECTION PRN
Status: CANCELLED | OUTPATIENT
Start: 2023-12-05

## 2023-11-28 RX ORDER — SODIUM CHLORIDE 9 MG/ML
5-250 INJECTION, SOLUTION INTRAVENOUS PRN
Status: CANCELLED | OUTPATIENT
Start: 2023-12-05

## 2023-11-28 RX ADMIN — Medication 200 MG: at 14:55

## 2023-11-28 RX ADMIN — SODIUM CHLORIDE 220 ML/HR: 9 INJECTION, SOLUTION INTRAVENOUS at 14:54

## 2023-11-28 RX ADMIN — Medication 10 ML: at 14:54

## 2023-11-28 ASSESSMENT — ENCOUNTER SYMPTOMS: BLURRED VISION: 1

## 2023-11-28 NOTE — PATIENT INSTRUCTIONS
serve consumers viewing this service as a supplement to, and not a substitute for, the expertise, skill, knowledge and judgment of healthcare practitioners. The absence of a warning for a given drug or drug combination in no way should be construed to indicate that the drug or drug combination is safe, effective or appropriate for any given patient. [facility] does not assume any responsibility for any aspect of healthcare administered with the aid of information [facility] provides. The information contained herein is not intended to cover all possible uses, directions, precautions, warnings, drug interactions, allergic reactions, or adverse effects. If you have questions about the drugs you are taking, check with your doctor, nurse or pharmacist.  Copyright 4451-8345 [employer]. Version: 1.01. Revision date: 9/13/2022. Care instructions adapted under license by [facility] ([facility]). If you have questions about a medical condition or this instruction, always ask your healthcare professional. [address] any warranty or liability for your use of this information.

## 2023-11-28 NOTE — PROGRESS NOTES
Aaron Grace is a 81 y.o. female is referred to me by [facility] APRIGNACIO  for evaluation and management of uncontrolled  Type 2 diabetes. Aaron Grace was diagnosed with Diabetes mellitus in 2000s . Diabetes was diagnosed at routine screening . Aaron Grace got diabetic education in the past.  Comorbid conditions: Neuropathy, Nephropathy, Retinopathy, Chronic Kidney Disease, and Coronary Artery Disease  Patient was initially started on metformin   Patient has tried the following medications for diabetes in the past metformin stopped due to issues with kidneys according to the patient    Patient also has comorbidities that include CHF , CAD s/p stent in 2023  and follows with Cardiology she also has COPD     INTERIM:    Diabetes  She presents for her initial diabetic visit. She has type 2 diabetes mellitus. No MedicAlert identification noted. The initial diagnosis of diabetes was made 20 years ago. Her disease course has been stable. Hypoglycemia symptoms include dizziness, sweats and tremors. Associated symptoms include blurred vision and foot paresthesias. There are no hypoglycemic complications. Symptoms are stable. Diabetic complications include heart disease, peripheral neuropathy and retinopathy. Risk factors for coronary artery disease include diabetes mellitus, dyslipidemia, obesity and post-menopausal. Current diabetic treatment includes insulin injections. Her weight is stable. She is following a generally unhealthy diet. Meal planning includes avoidance of concentrated sweets. She has not had a previous visit with a dietitian. She rarely participates in exercise. There is no change in her home blood glucose trend. Her breakfast blood glucose is taken between 7-8 am. Her breakfast blood glucose range is generally >200 mg/dl. Her lunch blood glucose is taken between 12-1 pm. Her lunch blood glucose range is generally >200 mg/dl. An ACE inhibitor/angiotensin II receptor blocker is being taken.  She

## 2023-11-30 ENCOUNTER — HOSPITAL ENCOUNTER (EMERGENCY)
Age: 73
Discharge: HOME | End: 2023-11-30
Attending: EMERGENCY MEDICINE
Payer: MEDICARE

## 2023-11-30 VITALS
SYSTOLIC BLOOD PRESSURE: 120 MMHG | TEMPERATURE: 98.2 F | DIASTOLIC BLOOD PRESSURE: 73 MMHG | HEART RATE: 91 BPM | OXYGEN SATURATION: 96 % | RESPIRATION RATE: 20 BRPM

## 2023-11-30 DIAGNOSIS — D63.8 ANEMIA OF CHRONIC DISEASE: Primary | ICD-10-CM

## 2023-11-30 DIAGNOSIS — R53.1 GENERAL WEAKNESS: ICD-10-CM

## 2023-11-30 LAB
ABO + RH BLD: NORMAL
ANION GAP SERPL CALCULATED.3IONS-SCNC: 9 MMOL/L (ref 3–16)
BASOPHILS # BLD: 0.1 K/UL (ref 0–0.2)
BASOPHILS NFR BLD: 0.9 %
BLD GP AB SCN SERPL QL: NORMAL
BUN SERPL-MCNC: 25 MG/DL (ref 7–20)
CALCIUM SERPL-MCNC: 9.1 MG/DL (ref 8.3–10.6)
CHLORIDE SERPL-SCNC: 97 MMOL/L (ref 99–110)
CO2 SERPL-SCNC: 29 MMOL/L (ref 21–32)
CREAT SERPL-MCNC: 0.8 MG/DL (ref 0.6–1.2)
DEPRECATED RDW RBC AUTO: 22.5 % (ref 12.4–15.4)
EOSINOPHIL # BLD: 0.2 K/UL (ref 0–0.6)
EOSINOPHIL NFR BLD: 2.4 %
GFR SERPLBLD CREATININE-BSD FMLA CKD-EPI: >60 ML/MIN/{1.73_M2}
GLUCOSE SERPL-MCNC: 279 MG/DL (ref 70–99)
HCT VFR BLD AUTO: 26.7 % (ref 36–48)
HGB BLD-MCNC: 7.8 G/DL (ref 12–16)
LYMPHOCYTES # BLD: 1.3 K/UL (ref 1–5.1)
LYMPHOCYTES NFR BLD: 17.1 %
MCH RBC QN AUTO: 22.4 PG (ref 26–34)
MCHC RBC AUTO-ENTMCNC: 29.1 G/DL (ref 31–36)
MCV RBC AUTO: 76.7 FL (ref 80–100)
MONOCYTES # BLD: 0.5 K/UL (ref 0–1.3)
MONOCYTES NFR BLD: 7.1 %
NEUTROPHILS # BLD: 5.5 K/UL (ref 1.7–7.7)
NEUTROPHILS NFR BLD: 72.5 %
PLATELET # BLD AUTO: 179 K/UL (ref 135–450)
PMV BLD AUTO: 9.6 FL (ref 5–10.5)
POTASSIUM SERPL-SCNC: 3.5 MMOL/L (ref 3.5–5.1)
RBC # BLD AUTO: 3.48 M/UL (ref 4–5.2)
SODIUM SERPL-SCNC: 135 MMOL/L (ref 136–145)
WBC # BLD AUTO: 7.6 K/UL (ref 4–11)

## 2023-11-30 PROCEDURE — 86850 RBC ANTIBODY SCREEN: CPT

## 2023-11-30 PROCEDURE — 80048 BASIC METABOLIC PNL TOTAL CA: CPT

## 2023-11-30 PROCEDURE — 99284 EMERGENCY DEPT VISIT MOD MDM: CPT

## 2023-11-30 PROCEDURE — 86900 BLOOD TYPING SEROLOGIC ABO: CPT

## 2023-11-30 PROCEDURE — 86901 BLOOD TYPING SEROLOGIC RH(D): CPT

## 2023-11-30 PROCEDURE — 85025 COMPLETE CBC W/AUTO DIFF WBC: CPT

## 2023-11-30 NOTE — TELEPHONE ENCOUNTER
Patient had labs done recently at [facility] in care everywhere updated today.

## 2023-11-30 NOTE — TELEPHONE ENCOUNTER
Her hemoglobin is lower at 7.5  She was told back in may to have a colonoscopy with Dr Navas  I spoke to Deepthi at Dr Duran office and she will get message and results to her    Please call patient and let her know to expect call from her PCP

## 2023-11-30 NOTE — TELEPHONE ENCOUNTER
I spoke with pt and relayed message per NPRG. Pt verbalized understanding. She says that she is not sure if she will have another Colonoscopy because that last time she had one done she has stopped breathing and she was intubated.

## 2023-12-05 ENCOUNTER — HOSPITAL ENCOUNTER (OUTPATIENT)
Dept: ONCOLOGY | Age: 73
Setting detail: INFUSION SERIES
Discharge: HOME | End: 2023-12-05
Payer: MEDICARE

## 2023-12-05 VITALS
TEMPERATURE: 96.6 F | HEART RATE: 96 BPM | DIASTOLIC BLOOD PRESSURE: 51 MMHG | SYSTOLIC BLOOD PRESSURE: 95 MMHG | RESPIRATION RATE: 20 BRPM

## 2023-12-05 DIAGNOSIS — D50.9 IRON DEFICIENCY ANEMIA, UNSPECIFIED IRON DEFICIENCY ANEMIA TYPE: ICD-10-CM

## 2023-12-05 DIAGNOSIS — D64.9 ANEMIA, UNSPECIFIED TYPE: Primary | ICD-10-CM

## 2023-12-05 PROCEDURE — 2580000003 HC RX 258: Performed by: NURSE PRACTITIONER

## 2023-12-05 PROCEDURE — 99211 OFF/OP EST MAY X REQ PHY/QHP: CPT

## 2023-12-05 PROCEDURE — 96365 THER/PROPH/DIAG IV INF INIT: CPT

## 2023-12-05 PROCEDURE — 6360000002 HC RX W HCPCS: Performed by: NURSE PRACTITIONER

## 2023-12-05 RX ORDER — SODIUM CHLORIDE 0.9 % (FLUSH) 0.9 %
5-40 SYRINGE (ML) INJECTION PRN
Status: DISCONTINUED | OUTPATIENT
Start: 2023-12-05 | End: 2023-12-06 | Stop reason: HOSPADM

## 2023-12-05 RX ORDER — SODIUM CHLORIDE 9 MG/ML
5-250 INJECTION, SOLUTION INTRAVENOUS PRN
Status: DISCONTINUED | OUTPATIENT
Start: 2023-12-05 | End: 2023-12-06 | Stop reason: HOSPADM

## 2023-12-05 RX ORDER — SODIUM CHLORIDE 9 MG/ML
5-250 INJECTION, SOLUTION INTRAVENOUS PRN
OUTPATIENT
Start: 2023-12-12

## 2023-12-05 RX ORDER — SODIUM CHLORIDE 0.9 % (FLUSH) 0.9 %
5-40 SYRINGE (ML) INJECTION PRN
OUTPATIENT
Start: 2023-12-12

## 2023-12-05 RX ADMIN — SODIUM CHLORIDE 200 ML/HR: 9 INJECTION, SOLUTION INTRAVENOUS at 14:09

## 2023-12-05 RX ADMIN — Medication 200 MG: at 14:10

## 2023-12-05 NOTE — PROGRESS NOTES
Patient ambulatory to department for third of five doses of venofer. Tolerated venofer infusion well with no adverse rx noted, denies need for AVS with information about Venofer to be printed. Verbally told about most common possible side effects. To return next week for next infusion.

## 2023-12-06 NOTE — ED PROVIDER NOTES
Emergency Department Encounter    Patient: Ventura Sousa  MRN: [id number]  : 1950  Date of Evaluation: 2023  ED Provider:  Tracey Mason MD    Triage Chief Complaint:   Abnormal Lab (Pt states that she was called by PCP today and was told her Hgb was low. )    [place]:  Ventura Sousa is a 72 y.o. female that presents to the ER for evaluation of generalized fatigue and weakness, she has a history of chronic kidney disease chronic supplemental oxygen use, was told by her PCP to follow-up with the ER as she had an abnormal hemoglobin.   No rectal bleeding no vaginal bleeding no hematemesis no hemoptysis    ROS - see HPI, below listed is current ROS at time of my eval:  General:  No fevers, no chills, no weakness  Eyes:  no discharge  ENT:  No sore throat, no nasal congestion  Cardiovascular:  No chest pain, no palpitations  Respiratory:  No shortness of breath, no cough, no wheezing  Gastrointestinal:  + pain, + nausea, no vomiting, no diarrhea  Musculoskeletal:  No muscle pain, no joint pain  Skin:  No rash, no pruritis  Neurologic:  no headache  Genitourinary:  No dysuria, no hematuria  Endocrine:  No unexpected weight gain, no unexpected weight loss  Extremities:  no edema, no pain    Past Medical History:   Diagnosis Date    Asthma     Back pain     DJD    Bipolar 1 disorder (HCC)     CAD (coronary artery disease)     Cancer (HCC)     Left breast CA    Cataract     CHF (congestive heart failure) (HCC)     HFpEF    Cirrhosis (HCC)     COPD (chronic obstructive pulmonary disease) (HCC)     Depression     Diabetes mellitus (HCC)     GERD (gastroesophageal reflux disease)     Hx of blood clots     PE 2018    Hypercholesteremia     Hyperlipidemia     Hypertension     Morbid obesity with body mass index (BMI) greater than or equal to 50 (HCC)     Osteoporosis     Pancytopenia (HCC)     Right rib fracture 10/20/2022    RSV bronchitis     Sciatica     Tachycardia      Past Surgical History:

## 2023-12-12 ENCOUNTER — HOSPITAL ENCOUNTER (OUTPATIENT)
Dept: ONCOLOGY | Age: 73
Setting detail: INFUSION SERIES
Discharge: HOME | End: 2023-12-12
Payer: MEDICARE

## 2023-12-12 VITALS
RESPIRATION RATE: 16 BRPM | HEART RATE: 86 BPM | TEMPERATURE: 96.9 F | DIASTOLIC BLOOD PRESSURE: 57 MMHG | SYSTOLIC BLOOD PRESSURE: 100 MMHG

## 2023-12-12 DIAGNOSIS — D50.9 IRON DEFICIENCY ANEMIA, UNSPECIFIED IRON DEFICIENCY ANEMIA TYPE: ICD-10-CM

## 2023-12-12 DIAGNOSIS — D64.9 ANEMIA, UNSPECIFIED TYPE: Primary | ICD-10-CM

## 2023-12-12 PROCEDURE — 96365 THER/PROPH/DIAG IV INF INIT: CPT

## 2023-12-12 PROCEDURE — 96367 TX/PROPH/DG ADDL SEQ IV INF: CPT

## 2023-12-12 PROCEDURE — 99211 OFF/OP EST MAY X REQ PHY/QHP: CPT

## 2023-12-12 PROCEDURE — 2580000003 HC RX 258: Performed by: NURSE PRACTITIONER

## 2023-12-12 PROCEDURE — 6360000002 HC RX W HCPCS: Performed by: NURSE PRACTITIONER

## 2023-12-12 RX ORDER — SODIUM CHLORIDE 0.9 % (FLUSH) 0.9 %
5-40 SYRINGE (ML) INJECTION PRN
OUTPATIENT
Start: 2023-12-19

## 2023-12-12 RX ORDER — SODIUM CHLORIDE 9 MG/ML
5-250 INJECTION, SOLUTION INTRAVENOUS PRN
OUTPATIENT
Start: 2023-12-19

## 2023-12-12 RX ORDER — SODIUM CHLORIDE 0.9 % (FLUSH) 0.9 %
5-40 SYRINGE (ML) INJECTION PRN
Status: DISCONTINUED | OUTPATIENT
Start: 2023-12-12 | End: 2023-12-13 | Stop reason: HOSPADM

## 2023-12-12 RX ORDER — SODIUM CHLORIDE 9 MG/ML
5-250 INJECTION, SOLUTION INTRAVENOUS PRN
Status: DISCONTINUED | OUTPATIENT
Start: 2023-12-12 | End: 2023-12-13 | Stop reason: HOSPADM

## 2023-12-12 RX ADMIN — Medication 200 MG: at 14:08

## 2023-12-12 RX ADMIN — Medication 10 ML: at 14:07

## 2023-12-12 RX ADMIN — SODIUM CHLORIDE 220 ML/HR: 9 INJECTION, SOLUTION INTRAVENOUS at 14:08

## 2023-12-12 NOTE — PROGRESS NOTES
Patient ambulatory to department for fourth of five doses of venofer. Tolerated venofer infusion well with no adverse rx noted, denies need for AVS with information about Venofer to be printed. Verbally told about most common possible side effects. To return next week for next infusion.

## 2023-12-19 ENCOUNTER — HOSPITAL ENCOUNTER (OUTPATIENT)
Dept: ONCOLOGY | Age: 73
Setting detail: INFUSION SERIES
Discharge: HOME | End: 2023-12-19
Payer: MEDICARE

## 2023-12-19 VITALS
DIASTOLIC BLOOD PRESSURE: 53 MMHG | HEART RATE: 82 BPM | RESPIRATION RATE: 16 BRPM | TEMPERATURE: 96.9 F | SYSTOLIC BLOOD PRESSURE: 99 MMHG

## 2023-12-19 DIAGNOSIS — D64.9 ANEMIA, UNSPECIFIED TYPE: Primary | ICD-10-CM

## 2023-12-19 DIAGNOSIS — D50.9 IRON DEFICIENCY ANEMIA, UNSPECIFIED IRON DEFICIENCY ANEMIA TYPE: ICD-10-CM

## 2023-12-19 PROCEDURE — 6360000002 HC RX W HCPCS: Performed by: NURSE PRACTITIONER

## 2023-12-19 PROCEDURE — 96365 THER/PROPH/DIAG IV INF INIT: CPT

## 2023-12-19 PROCEDURE — 2580000003 HC RX 258: Performed by: NURSE PRACTITIONER

## 2023-12-19 PROCEDURE — 99211 OFF/OP EST MAY X REQ PHY/QHP: CPT

## 2023-12-19 RX ORDER — SODIUM CHLORIDE 0.9 % (FLUSH) 0.9 %
5-40 SYRINGE (ML) INJECTION PRN
Status: CANCELLED | OUTPATIENT
Start: 2023-12-19

## 2023-12-19 RX ORDER — SODIUM CHLORIDE 9 MG/ML
5-250 INJECTION, SOLUTION INTRAVENOUS PRN
Status: CANCELLED | OUTPATIENT
Start: 2023-12-19

## 2023-12-19 RX ORDER — SODIUM CHLORIDE 9 MG/ML
5-250 INJECTION, SOLUTION INTRAVENOUS PRN
Status: DISCONTINUED | OUTPATIENT
Start: 2023-12-19 | End: 2023-12-19

## 2023-12-19 RX ORDER — SODIUM CHLORIDE 0.9 % (FLUSH) 0.9 %
5-40 SYRINGE (ML) INJECTION PRN
Status: DISCONTINUED | OUTPATIENT
Start: 2023-12-19 | End: 2023-12-19

## 2023-12-19 RX ADMIN — SODIUM CHLORIDE 220 ML/HR: 9 INJECTION, SOLUTION INTRAVENOUS at 14:15

## 2023-12-19 RX ADMIN — Medication 10 ML: at 14:14

## 2023-12-19 RX ADMIN — Medication 200 MG: at 14:15

## 2023-12-19 NOTE — PROGRESS NOTES
Patient ambulatory to department for last dose of venofer.  Tolerated venofer infusion well with no adverse rx noted, denies need for printed AVS . Pt to follow up with MD

## 2024-01-08 ENCOUNTER — HOSPITAL ENCOUNTER (OUTPATIENT)
Age: 74
Discharge: HOME | End: 2024-01-08
Payer: MEDICARE

## 2024-01-08 ENCOUNTER — OFFICE VISIT (OUTPATIENT)
Dept: CARDIOLOGY CLINIC | Age: 74
End: 2024-01-08
Payer: MEDICARE

## 2024-01-08 VITALS
HEIGHT: 60 IN | BODY MASS INDEX: 50.26 KG/M2 | OXYGEN SATURATION: 98 % | HEART RATE: 96 BPM | WEIGHT: 256 LBS | DIASTOLIC BLOOD PRESSURE: 60 MMHG | SYSTOLIC BLOOD PRESSURE: 110 MMHG

## 2024-01-08 DIAGNOSIS — I10 PRIMARY HYPERTENSION: ICD-10-CM

## 2024-01-08 DIAGNOSIS — E66.01 MORBID OBESITY WITH BMI OF 50.0-59.9, ADULT (HCC): ICD-10-CM

## 2024-01-08 DIAGNOSIS — D50.9 IRON DEFICIENCY ANEMIA, UNSPECIFIED IRON DEFICIENCY ANEMIA TYPE: ICD-10-CM

## 2024-01-08 DIAGNOSIS — I50.32 CHRONIC DIASTOLIC HEART FAILURE (HCC): ICD-10-CM

## 2024-01-08 DIAGNOSIS — I25.84 CORONARY ARTERY DISEASE DUE TO CALCIFIED CORONARY LESION: ICD-10-CM

## 2024-01-08 DIAGNOSIS — I50.32 CHRONIC DIASTOLIC HEART FAILURE (HCC): Primary | ICD-10-CM

## 2024-01-08 DIAGNOSIS — G47.33 OSA (OBSTRUCTIVE SLEEP APNEA): ICD-10-CM

## 2024-01-08 DIAGNOSIS — I25.10 CORONARY ARTERY DISEASE DUE TO CALCIFIED CORONARY LESION: ICD-10-CM

## 2024-01-08 LAB
ALBUMIN SERPL-MCNC: 4.5 G/DL (ref 3.4–5)
ALBUMIN/GLOB SERPL: 1.7 {RATIO} (ref 1.1–2.2)
ALP SERPL-CCNC: 66 U/L (ref 40–129)
ALT SERPL-CCNC: 18 U/L (ref 10–40)
ANION GAP SERPL CALCULATED.3IONS-SCNC: 12 MMOL/L (ref 3–16)
AST SERPL-CCNC: 21 U/L (ref 15–37)
BILIRUB SERPL-MCNC: 0.3 MG/DL (ref 0–1)
BUN SERPL-MCNC: 24 MG/DL (ref 7–20)
CALCIUM SERPL-MCNC: 9.2 MG/DL (ref 8.3–10.6)
CHLORIDE SERPL-SCNC: 97 MMOL/L (ref 99–110)
CO2 SERPL-SCNC: 27 MMOL/L (ref 21–32)
CREAT SERPL-MCNC: 0.8 MG/DL (ref 0.6–1.2)
DEPRECATED RDW RBC AUTO: 22.5 % (ref 12.4–15.4)
GFR SERPLBLD CREATININE-BSD FMLA CKD-EPI: >60 ML/MIN/{1.73_M2}
GLUCOSE SERPL-MCNC: 298 MG/DL (ref 70–99)
HCT VFR BLD AUTO: 25.2 % (ref 36–48)
HGB BLD-MCNC: 7.8 G/DL (ref 12–16)
IRON SATN MFR SERPL: 8 % (ref 15–50)
IRON SERPL-MCNC: 31 UG/DL (ref 37–145)
MCH RBC QN AUTO: 25.7 PG (ref 26–34)
MCHC RBC AUTO-ENTMCNC: 30.9 G/DL (ref 31–36)
MCV RBC AUTO: 83.3 FL (ref 80–100)
PLATELET # BLD AUTO: 205 K/UL (ref 135–450)
PMV BLD AUTO: 9.4 FL (ref 5–10.5)
POTASSIUM SERPL-SCNC: 4.6 MMOL/L (ref 3.5–5.1)
PROT SERPL-MCNC: 7.1 G/DL (ref 6.4–8.2)
RBC # BLD AUTO: 3.03 M/UL (ref 4–5.2)
SODIUM SERPL-SCNC: 136 MMOL/L (ref 136–145)
TIBC SERPL-MCNC: 398 UG/DL (ref 260–445)
WBC # BLD AUTO: 9.4 K/UL (ref 4–11)

## 2024-01-08 PROCEDURE — 3078F DIAST BP <80 MM HG: CPT | Performed by: CLINICAL NURSE SPECIALIST

## 2024-01-08 PROCEDURE — G8427 DOCREV CUR MEDS BY ELIG CLIN: HCPCS | Performed by: CLINICAL NURSE SPECIALIST

## 2024-01-08 PROCEDURE — 1090F PRES/ABSN URINE INCON ASSESS: CPT | Performed by: CLINICAL NURSE SPECIALIST

## 2024-01-08 PROCEDURE — 36415 COLL VENOUS BLD VENIPUNCTURE: CPT

## 2024-01-08 PROCEDURE — 83540 ASSAY OF IRON: CPT

## 2024-01-08 PROCEDURE — 3074F SYST BP LT 130 MM HG: CPT | Performed by: CLINICAL NURSE SPECIALIST

## 2024-01-08 PROCEDURE — 1036F TOBACCO NON-USER: CPT | Performed by: CLINICAL NURSE SPECIALIST

## 2024-01-08 PROCEDURE — 99214 OFFICE O/P EST MOD 30 MIN: CPT | Performed by: CLINICAL NURSE SPECIALIST

## 2024-01-08 PROCEDURE — 82728 ASSAY OF FERRITIN: CPT

## 2024-01-08 PROCEDURE — G8484 FLU IMMUNIZE NO ADMIN: HCPCS | Performed by: CLINICAL NURSE SPECIALIST

## 2024-01-08 PROCEDURE — 85027 COMPLETE CBC AUTOMATED: CPT

## 2024-01-08 PROCEDURE — G8400 PT W/DXA NO RESULTS DOC: HCPCS | Performed by: CLINICAL NURSE SPECIALIST

## 2024-01-08 PROCEDURE — G8417 CALC BMI ABV UP PARAM F/U: HCPCS | Performed by: CLINICAL NURSE SPECIALIST

## 2024-01-08 PROCEDURE — 80053 COMPREHEN METABOLIC PANEL: CPT

## 2024-01-08 PROCEDURE — 3017F COLORECTAL CA SCREEN DOC REV: CPT | Performed by: CLINICAL NURSE SPECIALIST

## 2024-01-08 PROCEDURE — 83550 IRON BINDING TEST: CPT

## 2024-01-08 PROCEDURE — 1123F ACP DISCUSS/DSCN MKR DOCD: CPT | Performed by: CLINICAL NURSE SPECIALIST

## 2024-01-08 PROCEDURE — 83880 ASSAY OF NATRIURETIC PEPTIDE: CPT

## 2024-01-08 RX ORDER — SENNOSIDES 8.6 MG
650 CAPSULE ORAL EVERY 8 HOURS PRN
COMMUNITY

## 2024-01-08 RX ORDER — METHOCARBAMOL 500 MG/1
500 TABLET, FILM COATED ORAL 4 TIMES DAILY
COMMUNITY

## 2024-01-08 NOTE — PATIENT INSTRUCTIONS
Blood work today   Continue all current medications  Call Dr Faustin about your blood sugars  Will get an appt with Dr Charles GAUTHIER in 6 months

## 2024-01-08 NOTE — PROGRESS NOTES
6/15/23  Yes Rg Soto MD   clopidogrel (PLAVIX) 75 MG tablet Take 1 tablet by mouth daily 6/15/23  Yes Rg Soto MD   docusate sodium (COLACE) 100 MG capsule Take 1 capsule by mouth daily as needed for Constipation   Yes Ward Stevenson MD   OXYGEN Inhale 2 L into the lungs continuous   Yes Ward Stevenson MD   aspirin 81 MG chewable tablet Take 1 tablet by mouth daily 5/3/23  Yes Margarito Mora MD   empagliflozin (JARDIANCE) 10 MG tablet Take 1 tablet by mouth daily 3/30/23  Yes oKki Ward APRN - CNS   vitamin B-12 (CYANOCOBALAMIN) 100 MCG tablet Take 10 tablets by mouth Once a week on Wednesdays   Yes Ward Stevenson MD   LEVEMIR FLEXTOUCH 100 UNIT/ML injection pen Inject 70 Units into the skin 2 times daily 2/9/23  Yes Ward Stevenson MD   niacin 500 MG CR capsule Take 1 capsule by mouth nightly   Yes ProviderWard MD   ARIPiprazole (ABILIFY) 5 MG tablet Take 1 tablet by mouth daily Pt takes 5 mg   Yes Ward Stevenson MD   montelukast (SINGULAIR) 10 MG tablet Take 1 tablet by mouth nightly   Yes ProviderWard MD   QUEtiapine (SEROQUEL) 100 MG tablet Take 1 tablet by mouth daily   Yes Ward Stevenson MD   albuterol (PROVENTIL HFA;VENTOLIN HFA) 108 (90 BASE) MCG/ACT inhaler Inhale 2 puffs into the lungs every 6 hours as needed   Yes Ward Stevenson MD   QUEtiapine (SEROQUEL) 200 MG tablet Take 1 tablet by mouth at bedtime   Yes Ward Stevenson MD   Continuous Blood Gluc  (FREESTYLE ARSENIO 2 READER) MELANI To check glucose levels  Patient not taking: Reported on 1/8/2024 11/28/23   Claudia Faustin MD   Continuous Blood Gluc Sensor (FREESTYLE ARSENIO 2 SENSOR) MISC Change every 14 days  Patient not taking: Reported on 1/8/2024 11/28/23   Claudia Faustin MD   Continuous Blood Gluc  (FREESTYLE ARSENIO 2 READER) MELANI To check glucose levels  Patient not taking: Reported on 1/8/2024 11/28/23   Claudia Faustin MD   Continuous Blood

## 2024-01-09 ENCOUNTER — TELEPHONE (OUTPATIENT)
Dept: CARDIOLOGY CLINIC | Age: 74
End: 2024-01-09

## 2024-01-09 LAB
FERRITIN SERPL IA-MCNC: 31.9 NG/ML (ref 15–150)
NT-PROBNP SERPL-MCNC: 137 PG/ML (ref 0–124)

## 2024-01-09 NOTE — TELEPHONE ENCOUNTER
----- Message from Koki Ward, APRN - CNS sent at 1/9/2024 11:31 AM EST -----  Please copy her blood work and I want to write a note for her PCP to address her anemia  She must get an appt with Dr Navas for a colonoscopy  She completed her IV iron and this has not helped  Fluid level is up a little and I want her to take an extra 40 mg of lasix for 2 days  Thanks      Tried to reach patient LMOM about lab results and medication change asked patient to return our call. Printed lab results and they are in NPRG folder. Faxed to PCP confirmation received.   Spoke to patient she verbalized understanding.

## 2024-01-10 ENCOUNTER — TRANSCRIBE ORDERS (OUTPATIENT)
Dept: ADMINISTRATIVE | Age: 74
End: 2024-01-10

## 2024-01-10 ENCOUNTER — TELEPHONE (OUTPATIENT)
Dept: ENDOCRINOLOGY | Age: 74
End: 2024-01-10

## 2024-01-10 DIAGNOSIS — E11.65 POORLY CONTROLLED TYPE 2 DIABETES MELLITUS WITH COMPLICATION (HCC): Primary | ICD-10-CM

## 2024-01-10 DIAGNOSIS — E11.8 POORLY CONTROLLED TYPE 2 DIABETES MELLITUS WITH COMPLICATION (HCC): Primary | ICD-10-CM

## 2024-01-10 DIAGNOSIS — M75.51 SUBACROMIAL BURSITIS OF RIGHT SHOULDER JOINT: Primary | ICD-10-CM

## 2024-01-10 NOTE — TELEPHONE ENCOUNTER
Call from pt stating that she had a Np appt with Dr. Faustin recently and her BS was high     Pt stated that her BS has continued to be high between 200 -400 even with taking her jardiance     Pt is wanting to know what Dr. Faustin would advise her to do?    Please advise   CB# [phone]

## 2024-01-11 RX ORDER — TIRZEPATIDE 10 MG/.5ML
INJECTION, SOLUTION SUBCUTANEOUS
Qty: 2 ML | Refills: 3 | Status: SHIPPED | OUTPATIENT
Start: 2024-01-11

## 2024-01-11 NOTE — TELEPHONE ENCOUNTER
Patient informed and expressed understanding. New prescriptions for Jardiance 25 mg and Mounjaro 10 mg sent to [facility] pharmacy.

## 2024-02-09 ENCOUNTER — APPOINTMENT (OUTPATIENT)
Dept: GENERAL RADIOLOGY | Age: 74
DRG: 378 | End: 2024-02-09
Payer: MEDICARE

## 2024-02-09 ENCOUNTER — APPOINTMENT (OUTPATIENT)
Dept: CT IMAGING | Age: 74
DRG: 378 | End: 2024-02-09
Payer: MEDICARE

## 2024-02-09 ENCOUNTER — HOSPITAL ENCOUNTER (INPATIENT)
Age: 74
LOS: 4 days | Discharge: HOME | DRG: 378 | End: 2024-02-13
Attending: INTERNAL MEDICINE | Admitting: INTERNAL MEDICINE
Payer: MEDICARE

## 2024-02-09 DIAGNOSIS — D64.9 SYMPTOMATIC ANEMIA: Primary | ICD-10-CM

## 2024-02-09 PROBLEM — D62 ACUTE BLOOD LOSS ANEMIA: Status: ACTIVE | Noted: 2024-02-09

## 2024-02-09 LAB
ABO + RH BLD: NORMAL
ALBUMIN SERPL-MCNC: 4.4 G/DL (ref 3.4–5)
ALBUMIN/GLOB SERPL: 1.6 {RATIO} (ref 1.1–2.2)
ALP SERPL-CCNC: 76 U/L (ref 40–129)
ALT SERPL-CCNC: 11 U/L (ref 10–40)
ANION GAP SERPL CALCULATED.3IONS-SCNC: 11 MMOL/L (ref 3–16)
ANISOCYTOSIS BLD QL SMEAR: ABNORMAL
APTT BLD: 31.8 SEC (ref 22.7–35.9)
AST SERPL-CCNC: 14 U/L (ref 15–37)
BASOPHILS # BLD: 0 K/UL (ref 0–0.2)
BASOPHILS NFR BLD: 0.4 %
BILIRUB SERPL-MCNC: 0.5 MG/DL (ref 0–1)
BLD GP AB SCN SERPL QL: NORMAL
BLOOD BANK DISPENSE STATUS: NORMAL
BLOOD BANK DISPENSE STATUS: NORMAL
BLOOD BANK PRODUCT CODE: NORMAL
BLOOD BANK PRODUCT CODE: NORMAL
BPU ID: NORMAL
BPU ID: NORMAL
BUN SERPL-MCNC: 14 MG/DL (ref 7–20)
CALCIUM SERPL-MCNC: 8.7 MG/DL (ref 8.3–10.6)
CHLORIDE SERPL-SCNC: 96 MMOL/L (ref 99–110)
CO2 SERPL-SCNC: 30 MMOL/L (ref 21–32)
CREAT SERPL-MCNC: 0.7 MG/DL (ref 0.6–1.2)
DACRYOCYTES BLD QL SMEAR: ABNORMAL
DEPRECATED RDW RBC AUTO: 20.3 % (ref 12.4–15.4)
DESCRIPTION BLOOD BANK: NORMAL
DESCRIPTION BLOOD BANK: NORMAL
EOSINOPHIL # BLD: 0 K/UL (ref 0–0.6)
EOSINOPHIL NFR BLD: 0.6 %
GFR SERPLBLD CREATININE-BSD FMLA CKD-EPI: >60 ML/MIN/{1.73_M2}
GLUCOSE SERPL-MCNC: 241 MG/DL (ref 70–99)
HCT VFR BLD AUTO: 19.1 % (ref 36–48)
HCT VFR BLD AUTO: 22.5 % (ref 36–48)
HGB BLD-MCNC: 5.6 G/DL (ref 12–16)
HGB BLD-MCNC: 6.8 G/DL (ref 12–16)
HYPOCHROMIA BLD QL SMEAR: ABNORMAL
INR PPP: 1.23 (ref 0.84–1.16)
LACTATE BLDV-SCNC: 1.1 MMOL/L (ref 0.4–2)
LDH SERPL L TO P-CCNC: 189 U/L (ref 100–190)
LYMPHOCYTES # BLD: 0.6 K/UL (ref 1–5.1)
LYMPHOCYTES NFR BLD: 11.4 %
MCH RBC QN AUTO: 22.8 PG (ref 26–34)
MCHC RBC AUTO-ENTMCNC: 30.1 G/DL (ref 31–36)
MCV RBC AUTO: 75.8 FL (ref 80–100)
MICROCYTES BLD QL SMEAR: ABNORMAL
MONOCYTES # BLD: 0.1 K/UL (ref 0–1.3)
MONOCYTES NFR BLD: 2.1 %
NEUTROPHILS # BLD: 4.3 K/UL (ref 1.7–7.7)
NEUTROPHILS NFR BLD: 85.5 %
OVALOCYTES BLD QL SMEAR: ABNORMAL
PLATELET # BLD AUTO: 128 K/UL (ref 135–450)
PLATELET BLD QL SMEAR: ADEQUATE
PMV BLD AUTO: 9.3 FL (ref 5–10.5)
POLYCHROMASIA BLD QL SMEAR: ABNORMAL
POTASSIUM SERPL-SCNC: 4.5 MMOL/L (ref 3.5–5.1)
PROT SERPL-MCNC: 7.2 G/DL (ref 6.4–8.2)
PROTHROMBIN TIME: 15.5 SEC (ref 11.5–14.8)
RBC # BLD AUTO: 2.97 M/UL (ref 4–5.2)
SLIDE REVIEW: ABNORMAL
SODIUM SERPL-SCNC: 137 MMOL/L (ref 136–145)
TARGETS BLD QL SMEAR: ABNORMAL
TROPONIN, HIGH SENSITIVITY: 14 NG/L (ref 0–14)
WBC # BLD AUTO: 5 K/UL (ref 4–11)

## 2024-02-09 PROCEDURE — 85014 HEMATOCRIT: CPT

## 2024-02-09 PROCEDURE — 2580000003 HC RX 258: Performed by: INTERNAL MEDICINE

## 2024-02-09 PROCEDURE — 86850 RBC ANTIBODY SCREEN: CPT

## 2024-02-09 PROCEDURE — 2060000000 HC ICU INTERMEDIATE R&B

## 2024-02-09 PROCEDURE — 84484 ASSAY OF TROPONIN QUANT: CPT

## 2024-02-09 PROCEDURE — 6360000004 HC RX CONTRAST MEDICATION: Performed by: PHYSICIAN ASSISTANT

## 2024-02-09 PROCEDURE — 99285 EMERGENCY DEPT VISIT HI MDM: CPT

## 2024-02-09 PROCEDURE — 83010 ASSAY OF HAPTOGLOBIN QUANT: CPT

## 2024-02-09 PROCEDURE — C9113 INJ PANTOPRAZOLE SODIUM, VIA: HCPCS | Performed by: INTERNAL MEDICINE

## 2024-02-09 PROCEDURE — 82746 ASSAY OF FOLIC ACID SERUM: CPT

## 2024-02-09 PROCEDURE — 30233N1 TRANSFUSION OF NONAUTOLOGOUS RED BLOOD CELLS INTO PERIPHERAL VEIN, PERCUTANEOUS APPROACH: ICD-10-PCS | Performed by: INTERNAL MEDICINE

## 2024-02-09 PROCEDURE — 96375 TX/PRO/DX INJ NEW DRUG ADDON: CPT

## 2024-02-09 PROCEDURE — 85610 PROTHROMBIN TIME: CPT

## 2024-02-09 PROCEDURE — 83550 IRON BINDING TEST: CPT

## 2024-02-09 PROCEDURE — 80053 COMPREHEN METABOLIC PANEL: CPT

## 2024-02-09 PROCEDURE — 96365 THER/PROPH/DIAG IV INF INIT: CPT

## 2024-02-09 PROCEDURE — 6370000000 HC RX 637 (ALT 250 FOR IP): Performed by: INTERNAL MEDICINE

## 2024-02-09 PROCEDURE — 82607 VITAMIN B-12: CPT

## 2024-02-09 PROCEDURE — 85025 COMPLETE CBC W/AUTO DIFF WBC: CPT

## 2024-02-09 PROCEDURE — 86901 BLOOD TYPING SEROLOGIC RH(D): CPT

## 2024-02-09 PROCEDURE — 83605 ASSAY OF LACTIC ACID: CPT

## 2024-02-09 PROCEDURE — 6360000002 HC RX W HCPCS: Performed by: INTERNAL MEDICINE

## 2024-02-09 PROCEDURE — 83615 LACTATE (LD) (LDH) ENZYME: CPT

## 2024-02-09 PROCEDURE — 85018 HEMOGLOBIN: CPT

## 2024-02-09 PROCEDURE — 86923 COMPATIBILITY TEST ELECTRIC: CPT

## 2024-02-09 PROCEDURE — P9016 RBC LEUKOCYTES REDUCED: HCPCS

## 2024-02-09 PROCEDURE — 82728 ASSAY OF FERRITIN: CPT

## 2024-02-09 PROCEDURE — 36430 TRANSFUSION BLD/BLD COMPNT: CPT

## 2024-02-09 PROCEDURE — 85730 THROMBOPLASTIN TIME PARTIAL: CPT

## 2024-02-09 PROCEDURE — 86900 BLOOD TYPING SEROLOGIC ABO: CPT

## 2024-02-09 PROCEDURE — 74174 CTA ABD&PLVS W/CONTRAST: CPT

## 2024-02-09 PROCEDURE — 71045 X-RAY EXAM CHEST 1 VIEW: CPT

## 2024-02-09 PROCEDURE — 83540 ASSAY OF IRON: CPT

## 2024-02-09 RX ORDER — ACETAMINOPHEN 325 MG/1
650 TABLET ORAL EVERY 6 HOURS PRN
Status: DISCONTINUED | OUTPATIENT
Start: 2024-02-09 | End: 2024-02-13 | Stop reason: HOSPADM

## 2024-02-09 RX ORDER — OCTREOTIDE ACETATE 100 UG/ML
100 INJECTION, SOLUTION INTRAVENOUS; SUBCUTANEOUS ONCE
Status: COMPLETED | OUTPATIENT
Start: 2024-02-09 | End: 2024-02-09

## 2024-02-09 RX ORDER — ACETAMINOPHEN 325 MG/1
TABLET ORAL
Status: DISPENSED
Start: 2024-02-09 | End: 2024-02-10

## 2024-02-09 RX ORDER — SODIUM CHLORIDE 9 MG/ML
INJECTION, SOLUTION INTRAVENOUS
Status: DISPENSED
Start: 2024-02-09 | End: 2024-02-10

## 2024-02-09 RX ORDER — PANTOPRAZOLE SODIUM 40 MG/10ML
40 INJECTION, POWDER, LYOPHILIZED, FOR SOLUTION INTRAVENOUS 2 TIMES DAILY
Status: DISCONTINUED | OUTPATIENT
Start: 2024-02-09 | End: 2024-02-13 | Stop reason: HOSPADM

## 2024-02-09 RX ORDER — SODIUM CHLORIDE 9 MG/ML
INJECTION, SOLUTION INTRAVENOUS PRN
Status: DISCONTINUED | OUTPATIENT
Start: 2024-02-09 | End: 2024-02-10 | Stop reason: HOSPADM

## 2024-02-09 RX ORDER — SODIUM CHLORIDE 9 MG/ML
INJECTION, SOLUTION INTRAVENOUS PRN
Status: DISCONTINUED | OUTPATIENT
Start: 2024-02-09 | End: 2024-02-11

## 2024-02-09 RX ORDER — ACETAMINOPHEN 650 MG/1
650 SUPPOSITORY RECTAL EVERY 6 HOURS PRN
Status: DISCONTINUED | OUTPATIENT
Start: 2024-02-09 | End: 2024-02-13 | Stop reason: HOSPADM

## 2024-02-09 RX ORDER — TIRZEPATIDE 7.5 MG/.5ML
INJECTION, SOLUTION SUBCUTANEOUS
Refills: 3 | OUTPATIENT
Start: 2024-02-09

## 2024-02-09 RX ADMIN — OCTREOTIDE ACETATE 100 MCG: 100 INJECTION, SOLUTION INTRAVENOUS; SUBCUTANEOUS at 21:52

## 2024-02-09 RX ADMIN — OCTREOTIDE ACETATE 50 MCG/HR: 500 INJECTION, SOLUTION INTRAVENOUS; SUBCUTANEOUS at 21:59

## 2024-02-09 RX ADMIN — ACETAMINOPHEN 650 MG: 325 TABLET ORAL at 20:32

## 2024-02-09 RX ADMIN — PANTOPRAZOLE SODIUM 40 MG: 40 INJECTION, POWDER, FOR SOLUTION INTRAVENOUS at 21:53

## 2024-02-09 RX ADMIN — IOPAMIDOL 75 ML: 755 INJECTION, SOLUTION INTRAVENOUS at 19:44

## 2024-02-09 NOTE — ED PROVIDER NOTES
[facility] EMERGENCY DEPARTMENT  EMERGENCY DEPARTMENT ENCOUNTER        Pt Name: Brandi Gomez  MRN: [id number]  Birthdate 1950  Date of evaluation: 2/9/2024  Provider: Yaw Hodges PA-C  PCP: Pricilla Duran MD  Note Started: 3:23 PM EST 2/9/24      MERA. I have evaluated this patient.        CHIEF COMPLAINT       Chief Complaint   Patient presents with    Fatigue     Pt to ED for blood transfusion.  Received iron transfusion today and was told her hgb was 6.4.  Pt has been feeling fatigued and SOB.       HISTORY OF PRESENT ILLNESS: 1 or more Elements     History From: patient    Brandi Gomez is a 73 y.o. female with past medical history of chronic anemia, CKD, hypertension, diabetes, CHF, COPD on chronic home O2, CAD, bipolar who presents complaining of anemia, needing a blood transfusion.  Patient was seen at [facility] today for her first iron transfusion.  She had a CBC done showing hemoglobin of 6.4.  She brings a paper with her from [facility] who referred her to the ER for blood transfusion due to no blood being available at outpatient center today.  Patient does admit fatigue, shortness of breath with exertion typical of the prior anemia she has had.  Denies any hemoptysis, blood in stool, blood in urine, rash/petechia, dizziness or syncope, chest pain, shortness of breath, fever or cough.    Nursing Notes were all reviewed and agreed with or any disagreements were addressed in the HPI.    REVIEW OF SYSTEMS :      Review of Systems   All other systems reviewed and are negative.      Positives and Pertinent negatives as per HPI.       PAST MEDICAL HISTORY    has a past medical history of Asthma, Back pain, Bipolar 1 disorder (HCC), CAD (coronary artery disease), Cancer (HCC), Cataract, CHF (congestive heart failure) (HCC), Cirrhosis (HCC), COPD (chronic obstructive pulmonary disease) (HCC), Depression, Diabetes mellitus (HCC), GERD (gastroesophageal reflux disease), blood clots,

## 2024-02-09 NOTE — ACP (ADVANCE CARE PLANNING)
Advanced Care Planning Note.    Purpose of Encounter: Advanced care planning in light of hospitalization  Parties In Attendance: Patient,    Decisional Capacity: Yes  Subjective: Patient  understand that this conversation is to address long term care goal  Objective: Patient mated to the hospital with acute blood loss anemia  Goals of Care Determination: Patient would not pursue CPR and Intubation if required.would consider blood transfusions as needed  Code Status: dnr cca dni  Time spent on Advanced care Plannin minutes  Advanced Care Planning Documents: documented patient's wishes, would like Sister In Law Shelby to make medical decisions if unable to make decisions    Fede Mckeon MD  2024 5:53 PM

## 2024-02-10 LAB
ANION GAP SERPL CALCULATED.3IONS-SCNC: 11 MMOL/L (ref 3–16)
BASOPHILS # BLD: 0 K/UL (ref 0–0.2)
BASOPHILS NFR BLD: 0.1 %
BUN SERPL-MCNC: 15 MG/DL (ref 7–20)
CALCIUM SERPL-MCNC: 9.1 MG/DL (ref 8.3–10.6)
CHLORIDE SERPL-SCNC: 101 MMOL/L (ref 99–110)
CO2 SERPL-SCNC: 26 MMOL/L (ref 21–32)
CREAT SERPL-MCNC: 0.9 MG/DL (ref 0.6–1.2)
DEPRECATED RDW RBC AUTO: 20.1 % (ref 12.4–15.4)
EOSINOPHIL # BLD: 0 K/UL (ref 0–0.6)
EOSINOPHIL NFR BLD: 0 %
GFR SERPLBLD CREATININE-BSD FMLA CKD-EPI: >60 ML/MIN/{1.73_M2}
GLUCOSE BLD-MCNC: 174 MG/DL (ref 70–99)
GLUCOSE BLD-MCNC: 175 MG/DL (ref 70–99)
GLUCOSE BLD-MCNC: 221 MG/DL (ref 70–99)
GLUCOSE BLD-MCNC: 278 MG/DL (ref 70–99)
GLUCOSE BLD-MCNC: 290 MG/DL (ref 70–99)
GLUCOSE SERPL-MCNC: 197 MG/DL (ref 70–99)
HAPTOGLOB SERPL-MCNC: 253 MG/DL (ref 30–200)
HCT VFR BLD AUTO: 27.7 % (ref 36–48)
HCT VFR BLD AUTO: 28.1 % (ref 36–48)
HCT VFR BLD AUTO: 29.3 % (ref 36–48)
HCT VFR BLD AUTO: 31.2 % (ref 36–48)
HEMOCCULT STL QL: NORMAL
HGB BLD-MCNC: 8.4 G/DL (ref 12–16)
HGB BLD-MCNC: 8.4 G/DL (ref 12–16)
HGB BLD-MCNC: 8.8 G/DL (ref 12–16)
HGB BLD-MCNC: 9.3 G/DL (ref 12–16)
IRON SATN MFR SERPL: ABNORMAL % (ref 15–50)
IRON SERPL-MCNC: 427 UG/DL (ref 37–145)
LYMPHOCYTES # BLD: 0.7 K/UL (ref 1–5.1)
LYMPHOCYTES NFR BLD: 10.4 %
MCH RBC QN AUTO: 23.7 PG (ref 26–34)
MCHC RBC AUTO-ENTMCNC: 30.3 G/DL (ref 31–36)
MCV RBC AUTO: 78.3 FL (ref 80–100)
MONOCYTES # BLD: 0.2 K/UL (ref 0–1.3)
MONOCYTES NFR BLD: 3.3 %
NEUTROPHILS # BLD: 5.6 K/UL (ref 1.7–7.7)
NEUTROPHILS NFR BLD: 86.2 %
NT-PROBNP SERPL-MCNC: 80 PG/ML (ref 0–124)
PATH INTERP BLD-IMP: NORMAL
PERFORMED ON: ABNORMAL
PLATELET # BLD AUTO: 137 K/UL (ref 135–450)
PMV BLD AUTO: 9.5 FL (ref 5–10.5)
POTASSIUM SERPL-SCNC: 4.6 MMOL/L (ref 3.5–5.1)
RBC # BLD AUTO: 3.54 M/UL (ref 4–5.2)
SODIUM SERPL-SCNC: 138 MMOL/L (ref 136–145)
TIBC SERPL-MCNC: ABNORMAL UG/DL (ref 260–445)
WBC # BLD AUTO: 6.5 K/UL (ref 4–11)

## 2024-02-10 PROCEDURE — 36415 COLL VENOUS BLD VENIPUNCTURE: CPT

## 2024-02-10 PROCEDURE — 99223 1ST HOSP IP/OBS HIGH 75: CPT | Performed by: INTERNAL MEDICINE

## 2024-02-10 PROCEDURE — 94760 N-INVAS EAR/PLS OXIMETRY 1: CPT

## 2024-02-10 PROCEDURE — 94640 AIRWAY INHALATION TREATMENT: CPT

## 2024-02-10 PROCEDURE — 2580000003 HC RX 258: Performed by: INTERNAL MEDICINE

## 2024-02-10 PROCEDURE — 6370000000 HC RX 637 (ALT 250 FOR IP): Performed by: INTERNAL MEDICINE

## 2024-02-10 PROCEDURE — 80048 BASIC METABOLIC PNL TOTAL CA: CPT

## 2024-02-10 PROCEDURE — 6360000002 HC RX W HCPCS: Performed by: INTERNAL MEDICINE

## 2024-02-10 PROCEDURE — 93005 ELECTROCARDIOGRAM TRACING: CPT | Performed by: INTERNAL MEDICINE

## 2024-02-10 PROCEDURE — 2700000000 HC OXYGEN THERAPY PER DAY

## 2024-02-10 PROCEDURE — 2060000000 HC ICU INTERMEDIATE R&B

## 2024-02-10 PROCEDURE — 85018 HEMOGLOBIN: CPT

## 2024-02-10 PROCEDURE — 82270 OCCULT BLOOD FECES: CPT

## 2024-02-10 PROCEDURE — 85014 HEMATOCRIT: CPT

## 2024-02-10 PROCEDURE — 83880 ASSAY OF NATRIURETIC PEPTIDE: CPT

## 2024-02-10 PROCEDURE — 85025 COMPLETE CBC W/AUTO DIFF WBC: CPT

## 2024-02-10 PROCEDURE — C9113 INJ PANTOPRAZOLE SODIUM, VIA: HCPCS | Performed by: INTERNAL MEDICINE

## 2024-02-10 RX ORDER — CARVEDILOL 6.25 MG/1
6.25 TABLET ORAL 2 TIMES DAILY WITH MEALS
Status: DISCONTINUED | OUTPATIENT
Start: 2024-02-10 | End: 2024-02-11

## 2024-02-10 RX ORDER — LANOLIN ALCOHOL/MO/W.PET/CERES
1000 CREAM (GRAM) TOPICAL DAILY
Status: DISCONTINUED | OUTPATIENT
Start: 2024-02-10 | End: 2024-02-10

## 2024-02-10 RX ORDER — SODIUM CHLORIDE 9 MG/ML
INJECTION, SOLUTION INTRAVENOUS PRN
Status: DISCONTINUED | OUTPATIENT
Start: 2024-02-10 | End: 2024-02-13 | Stop reason: HOSPADM

## 2024-02-10 RX ORDER — INSULIN GLARGINE 100 [IU]/ML
70 INJECTION, SOLUTION SUBCUTANEOUS 2 TIMES DAILY
Status: DISCONTINUED | OUTPATIENT
Start: 2024-02-10 | End: 2024-02-13 | Stop reason: HOSPADM

## 2024-02-10 RX ORDER — LANOLIN ALCOHOL/MO/W.PET/CERES
500 CREAM (GRAM) TOPICAL NIGHTLY
Status: DISCONTINUED | OUTPATIENT
Start: 2024-02-10 | End: 2024-02-13 | Stop reason: HOSPADM

## 2024-02-10 RX ORDER — ALBUTEROL SULFATE 90 UG/1
2 AEROSOL, METERED RESPIRATORY (INHALATION) EVERY 6 HOURS PRN
Status: DISCONTINUED | OUTPATIENT
Start: 2024-02-10 | End: 2024-02-13 | Stop reason: HOSPADM

## 2024-02-10 RX ORDER — GLUCAGON 1 MG/ML
1 KIT INJECTION PRN
Status: DISCONTINUED | OUTPATIENT
Start: 2024-02-10 | End: 2024-02-13 | Stop reason: HOSPADM

## 2024-02-10 RX ORDER — LOSARTAN POTASSIUM 25 MG/1
50 TABLET ORAL DAILY
Status: DISCONTINUED | OUTPATIENT
Start: 2024-02-10 | End: 2024-02-13 | Stop reason: HOSPADM

## 2024-02-10 RX ORDER — INSULIN LISPRO 100 [IU]/ML
0-4 INJECTION, SOLUTION INTRAVENOUS; SUBCUTANEOUS NIGHTLY
Status: DISCONTINUED | OUTPATIENT
Start: 2024-02-10 | End: 2024-02-13 | Stop reason: HOSPADM

## 2024-02-10 RX ORDER — DEXTROSE MONOHYDRATE 100 MG/ML
INJECTION, SOLUTION INTRAVENOUS CONTINUOUS PRN
Status: DISCONTINUED | OUTPATIENT
Start: 2024-02-10 | End: 2024-02-13 | Stop reason: HOSPADM

## 2024-02-10 RX ORDER — INSULIN LISPRO 100 [IU]/ML
0-16 INJECTION, SOLUTION INTRAVENOUS; SUBCUTANEOUS
Status: DISCONTINUED | OUTPATIENT
Start: 2024-02-10 | End: 2024-02-13 | Stop reason: HOSPADM

## 2024-02-10 RX ORDER — ROSUVASTATIN CALCIUM 40 MG/1
40 TABLET, COATED ORAL NIGHTLY
Status: DISCONTINUED | OUTPATIENT
Start: 2024-02-10 | End: 2024-02-13 | Stop reason: HOSPADM

## 2024-02-10 RX ORDER — ARIPIPRAZOLE 5 MG/1
5 TABLET ORAL DAILY
Status: DISCONTINUED | OUTPATIENT
Start: 2024-02-10 | End: 2024-02-13 | Stop reason: HOSPADM

## 2024-02-10 RX ORDER — LANOLIN ALCOHOL/MO/W.PET/CERES
9 CREAM (GRAM) TOPICAL NIGHTLY PRN
Status: DISCONTINUED | OUTPATIENT
Start: 2024-02-10 | End: 2024-02-13 | Stop reason: HOSPADM

## 2024-02-10 RX ORDER — QUETIAPINE FUMARATE 100 MG/1
100 TABLET, FILM COATED ORAL DAILY
Status: DISCONTINUED | OUTPATIENT
Start: 2024-02-10 | End: 2024-02-13 | Stop reason: HOSPADM

## 2024-02-10 RX ORDER — SODIUM CHLORIDE 0.9 % (FLUSH) 0.9 %
5-40 SYRINGE (ML) INJECTION PRN
Status: DISCONTINUED | OUTPATIENT
Start: 2024-02-10 | End: 2024-02-13 | Stop reason: HOSPADM

## 2024-02-10 RX ORDER — ASPIRIN 81 MG/1
81 TABLET ORAL DAILY
Status: DISCONTINUED | OUTPATIENT
Start: 2024-02-10 | End: 2024-02-13 | Stop reason: HOSPADM

## 2024-02-10 RX ORDER — POLYETHYLENE GLYCOL 3350 17 G/17G
17 POWDER, FOR SOLUTION ORAL DAILY PRN
Status: DISCONTINUED | OUTPATIENT
Start: 2024-02-10 | End: 2024-02-13 | Stop reason: HOSPADM

## 2024-02-10 RX ORDER — LANOLIN ALCOHOL/MO/W.PET/CERES
1000 CREAM (GRAM) TOPICAL
Status: DISCONTINUED | OUTPATIENT
Start: 2024-02-14 | End: 2024-02-13 | Stop reason: HOSPADM

## 2024-02-10 RX ORDER — DOCUSATE SODIUM 100 MG/1
100 CAPSULE, LIQUID FILLED ORAL 2 TIMES DAILY
Status: DISCONTINUED | OUTPATIENT
Start: 2024-02-10 | End: 2024-02-13 | Stop reason: HOSPADM

## 2024-02-10 RX ORDER — METHOCARBAMOL 500 MG/1
500 TABLET, FILM COATED ORAL 3 TIMES DAILY
Status: DISCONTINUED | OUTPATIENT
Start: 2024-02-10 | End: 2024-02-13 | Stop reason: HOSPADM

## 2024-02-10 RX ORDER — QUETIAPINE FUMARATE 100 MG/1
200 TABLET, FILM COATED ORAL NIGHTLY
Status: DISCONTINUED | OUTPATIENT
Start: 2024-02-10 | End: 2024-02-13 | Stop reason: HOSPADM

## 2024-02-10 RX ORDER — SODIUM CHLORIDE 0.9 % (FLUSH) 0.9 %
5-40 SYRINGE (ML) INJECTION EVERY 12 HOURS SCHEDULED
Status: DISCONTINUED | OUTPATIENT
Start: 2024-02-10 | End: 2024-02-13 | Stop reason: HOSPADM

## 2024-02-10 RX ORDER — MONTELUKAST SODIUM 10 MG/1
10 TABLET ORAL NIGHTLY
Status: DISCONTINUED | OUTPATIENT
Start: 2024-02-10 | End: 2024-02-13 | Stop reason: HOSPADM

## 2024-02-10 RX ADMIN — ROSUVASTATIN CALCIUM 40 MG: 40 TABLET, COATED ORAL at 02:24

## 2024-02-10 RX ADMIN — DOCUSATE SODIUM 100 MG: 100 CAPSULE, LIQUID FILLED ORAL at 08:15

## 2024-02-10 RX ADMIN — ASPIRIN 81 MG: 81 TABLET, COATED ORAL at 13:03

## 2024-02-10 RX ADMIN — ARIPIPRAZOLE 5 MG: 5 TABLET ORAL at 08:15

## 2024-02-10 RX ADMIN — NIACIN 500 MG: 500 TABLET, EXTENDED RELEASE ORAL at 20:24

## 2024-02-10 RX ADMIN — CARVEDILOL 6.25 MG: 6.25 TABLET, FILM COATED ORAL at 08:15

## 2024-02-10 RX ADMIN — INSULIN GLARGINE 70 UNITS: 100 INJECTION, SOLUTION SUBCUTANEOUS at 20:22

## 2024-02-10 RX ADMIN — ACETAMINOPHEN 650 MG: 325 TABLET ORAL at 02:24

## 2024-02-10 RX ADMIN — METHOCARBAMOL 500 MG: 500 TABLET ORAL at 20:23

## 2024-02-10 RX ADMIN — SODIUM CHLORIDE, PRESERVATIVE FREE 10 ML: 5 INJECTION INTRAVENOUS at 20:25

## 2024-02-10 RX ADMIN — CARVEDILOL 6.25 MG: 6.25 TABLET, FILM COATED ORAL at 02:24

## 2024-02-10 RX ADMIN — SODIUM CHLORIDE, PRESERVATIVE FREE 10 ML: 5 INJECTION INTRAVENOUS at 08:16

## 2024-02-10 RX ADMIN — FUROSEMIDE 60 MG: 40 TABLET ORAL at 13:03

## 2024-02-10 RX ADMIN — CARVEDILOL 6.25 MG: 6.25 TABLET, FILM COATED ORAL at 17:07

## 2024-02-10 RX ADMIN — QUETIAPINE FUMARATE 100 MG: 100 TABLET ORAL at 08:15

## 2024-02-10 RX ADMIN — METHOCARBAMOL 500 MG: 500 TABLET ORAL at 13:04

## 2024-02-10 RX ADMIN — ACETAMINOPHEN 650 MG: 325 TABLET ORAL at 13:07

## 2024-02-10 RX ADMIN — INSULIN GLARGINE 70 UNITS: 100 INJECTION, SOLUTION SUBCUTANEOUS at 08:16

## 2024-02-10 RX ADMIN — DOCUSATE SODIUM 100 MG: 100 CAPSULE, LIQUID FILLED ORAL at 20:23

## 2024-02-10 RX ADMIN — PANTOPRAZOLE SODIUM 40 MG: 40 INJECTION, POWDER, FOR SOLUTION INTRAVENOUS at 08:16

## 2024-02-10 RX ADMIN — PANTOPRAZOLE SODIUM 40 MG: 40 INJECTION, POWDER, FOR SOLUTION INTRAVENOUS at 20:22

## 2024-02-10 RX ADMIN — TIOTROPIUM BROMIDE INHALATION SPRAY 1 PUFF: 3.12 SPRAY, METERED RESPIRATORY (INHALATION) at 11:54

## 2024-02-10 RX ADMIN — EMPAGLIFLOZIN 25 MG: 10 TABLET, FILM COATED ORAL at 13:03

## 2024-02-10 RX ADMIN — LOSARTAN POTASSIUM 50 MG: 25 TABLET, FILM COATED ORAL at 13:03

## 2024-02-10 RX ADMIN — INSULIN LISPRO 8 UNITS: 100 INJECTION, SOLUTION INTRAVENOUS; SUBCUTANEOUS at 17:07

## 2024-02-10 RX ADMIN — QUETIAPINE FUMARATE 200 MG: 100 TABLET ORAL at 20:23

## 2024-02-10 RX ADMIN — ROSUVASTATIN CALCIUM 40 MG: 40 TABLET, COATED ORAL at 20:24

## 2024-02-10 RX ADMIN — MONTELUKAST SODIUM 10 MG: 10 TABLET, FILM COATED ORAL at 20:23

## 2024-02-10 RX ADMIN — QUETIAPINE FUMARATE 200 MG: 100 TABLET ORAL at 02:24

## 2024-02-10 NOTE — CONSULTS
[facility]      CONSULTATION  [phone]      Chief Complaint   Patient presents with    Fatigue     Pt to ED for blood transfusion.  Received iron transfusion today and was told her hgb was 6.4.  Pt has been feeling fatigued and SOB.        Reason for consult:  \"need for aspirin and plavix\"    ASSESSMENT AND PLAN:  Acute blood loss anemia  CAD in native artery s/p SONYA 6/2023, has been on aspirin and Plavix  COPD on 2 L at home  Chronic iron deficiency anemia  Morbid obesity    -Ok to stop Plavix at this time, would ideally restart it once bleeding source discovered and treated given that she has a left main stent, which is a higher risk location    -EKG and troponin reassuring.  No additional ischemic w/u needed at this time    -Restart home aspirin 81 mg daily    -No cardiac contraindication to doing urgent EGD or C-scope.  No further pre-scope cardiac testing is needed    -Volume status hard to assess in light of her morbid obesity.  Last LVEF within normal limits    -Repeat BNP.  No need at this time for a new echo.    History of Present Illness:  Brandi Gomez is a 73 y.o. patient with CAD s/p LM stent 6/2023, also CKD, and severe iron def anemia, went to [facility] to get iron infusion but found to have very low Hb and instead needed PRBCs, which were not available there, so she was sent to ER.  No melena or BRBPR.  She has chronic SOB, on 2 L O2 per NC, but no chest pain.  She has been taking aspirin and plavix since her LM stent.    Past Medical History:   has a past medical history of Asthma, Back pain, Bipolar 1 disorder (HCC), CAD (coronary artery disease), Cancer (HCC), Cataract, CHF (congestive heart failure) (HCC), Cirrhosis (HCC), COPD (chronic obstructive pulmonary disease) (HCC), Depression, Diabetes mellitus (HCC), GERD (gastroesophageal reflux disease), Hx of blood clots, Hypercholesteremia, Hyperlipidemia, Hypertension, Morbid obesity with body mass index (BMI) greater than or equal

## 2024-02-10 NOTE — CONSULTS
GI Consult Note      Admission Date: 2/9/2024  Hospital Day: Hospital Day: 2  Attending: Aspen Villanueva MD  Date of service: 2/10/24    Subjective:     Chief complaint/ Reason for consult:   Symptomatic anemia    HPI: Brandi Gomez is a 73 y.o.  female patient, who was seen at the request of Aspen Guerin MD.    History was obtained from chart review and the patient.       Patient has history of iron deficiency anemia.  She follows with [facility] and remembers getting at least 8 IV iron infusion.  On her last iron infusion yesterday, she was found to have a hemoglobin of 6.4.  She was sent to the ER for blood transfusion.  Patient has progressive shortness of breath and fatigue.  She denies having abdominal pain.  She used to take oral iron but stopped 3 weeks ago.  Even with stopping oral iron, she has noted intermittent black stools.  She is chronically constipated, would only have a bowel movement once every 3 days.    She received 2 units of packed red cells overnight.  Hemoglobin today is 8.4.    Her past medical history significant for CHF, CKD, morbid obesity COPD on chronic home oxygen, CAD with recent cardiac stent placed in June 2023 on dual antiplatelet therapy, bipolar disorder.    Her last EGD with Dr. Navas in May 2023 was normal.  Previous to that she had an EGD and colonoscopy with Dr. Robles in May 2022 (please see details below).    Past Endoscopic History:  EGD Dr. Navas 5/2023   Findings:   Small duodenal lipoma  Otherwise normal EGD - no source of melena/anemia noted     EGD and colonoscopy May 6, 2022 by Dr. Robles demonstrated a normal esophagus, normal stomach, and some small nodules in the duodenum biopsies of which were normal tissue.  Colonoscopy demonstrated multiple small and large polyps and a poor prep.  The polyps that were removed (greater than 5) were either tubulovillous or tubular adenomas.  She was recommended to have a repeat colonoscopy within

## 2024-02-10 NOTE — ED NOTES
RN remained with pt at bedside during first 15 minutes of blood transfusion. VSS. No signs of transfusion reaction. Infusion increased to 100 mL/hr.

## 2024-02-10 NOTE — ED NOTES
ED TO INPATIENT SBAR HANDOFF    Patient Name: Brandi Gomez   :  1950  73 y.o.   MRN:  [id number]  Preferred Name  Brandi  ED Room #:  ED-0010/10  Family/Caregiver Present no   Restraints no   Sitter no   Sepsis Risk Score Sepsis Risk Score: 5.59    Situation  Code Status: DNR-CCA No additional code details.    Allergies: Penicillins  Weight: Patient Vitals for the past 96 hrs (Last 3 readings):   Weight   24 1506 116.6 kg (257 lb)     Arrived from: home  Chief Complaint:   Chief Complaint   Patient presents with    Fatigue     Pt to ED for blood transfusion.  Received iron transfusion today and was told her hgb was 6.4.  Pt has been feeling fatigued and SOB.     Hospital Problem/Diagnosis:  Principal Problem:    Acute blood loss anemia  Resolved Problems:    * No resolved hospital problems. *    Imaging:   CTA ABDOMEN PELVIS W WO CONTRAST   Final Result   1. No evidence of active arterial gastrointestinal hemorrhage.   2. No evidence of intra-abdominal or retroperitoneal hemorrhage.   3. No acute intra-abdominal or pelvic process.         XR CHEST PORTABLE   Final Result   Mild stable cardiac enlargement.      No acute cardiopulmonary disease, no change compared to the prior study.           Abnormal labs:   Abnormal Labs Reviewed   CBC WITH AUTO DIFFERENTIAL - Abnormal; Notable for the following components:       Result Value    RBC 2.97 (*)     Hemoglobin 6.8 (*)     Hematocrit 22.5 (*)     MCV 75.8 (*)     MCH 22.8 (*)     MCHC 30.1 (*)     RDW 20.3 (*)     Platelets 128 (*)     Lymphocytes Absolute 0.6 (*)     Anisocytosis Occasional (*)     Microcytes Occasional (*)     Polychromasia Occasional (*)     Hypochromia Occasional (*)     Ovalocytes Occasional (*)     Target Cells Occasional (*)     Tear Drop Cells Occasional (*)     All other components within normal limits    Narrative:     CALL  [place]oks  SFERF tel. [phone],  Hematology results called to and read back by NIRMAL Harrington,

## 2024-02-10 NOTE — PLAN OF CARE
Full note to follow    I discussed case with Dr Soto who did her left main stent 6/2023    Typically, with left main stenting, we prefer indefinite DAPT, given a high risk of severe disease if left main were to thrombose.    Per Dr Soto, since it has been well over 6 months since the stent, ok to hold Plavix in the short term while determining source or bleeding.    We would prefer to keep patient on baby aspirin 81 mg while off Plavix.

## 2024-02-10 NOTE — RT PROTOCOL NOTE
RT Inhaler-Nebulizer Bronchodilator Protocol Note    There is a bronchodilator order in the chart from a provider indicating to follow the RT Bronchodilator Protocol and there is an “Initiate RT Inhaler-Nebulizer Bronchodilator Protocol” order as well (see protocol at bottom of note).    CXR Findings:  XR CHEST PORTABLE    Result Date: 2/9/2024  Mild stable cardiac enlargement. No acute cardiopulmonary disease, no change compared to the prior study.       The findings from the last RT Protocol Assessment were as follows:   History Pulmonary Disease: Chronic pulmonary disease  Respiratory Pattern: Regular pattern and RR 12-20 bpm  Breath Sounds: Slightly diminished and/or crackles  Cough: Strong, spontaneous, non-productive  Indication for Bronchodilator Therapy:    Bronchodilator Assessment Score: 4    Aerosolized bronchodilator medication orders have been revised according to the RT Inhaler-Nebulizer Bronchodilator Protocol below.    Respiratory Therapist to perform RT Therapy Protocol Assessment initially then follow the protocol.  Repeat RT Therapy Protocol Assessment PRN for score 0-3 or on second treatment, BID, and PRN for scores above 3.    No Indications - adjust the frequency to every 6 hours PRN wheezing or bronchospasm, if no treatments needed after 48 hours then discontinue using Per Protocol order mode.     If indication present, adjust the RT bronchodilator orders based on the Bronchodilator Assessment Score as indicated below.  Use Inhaler orders unless patient has one or more of the following: on home nebulizer, not able to hold breath for 10 seconds, is not alert and oriented, cannot activate and use MDI correctly, or respiratory rate 25 breaths per minute or more, then use the equivalent nebulizer order(s) with same Frequency and PRN reasons based on the score.  If a patient is on this medication at home then do not decrease Frequency below that used at home.    0-3 - enter or revise RT

## 2024-02-10 NOTE — SIGNIFICANT EVENT
Patint last CTA showed Portal HTN. Added GI protocol CT scan with empiric octreatide bolus drip with PPI and Ceftraixone until varices is ruled out.    CT came back negative for varices or cirrhosis will dc octreotide and Abx

## 2024-02-11 LAB
ANION GAP SERPL CALCULATED.3IONS-SCNC: 12 MMOL/L (ref 3–16)
BASOPHILS # BLD: 0 K/UL (ref 0–0.2)
BASOPHILS NFR BLD: 0.6 %
CALCIUM SERPL-MCNC: 8.9 MG/DL (ref 8.3–10.6)
CHLORIDE SERPL-SCNC: 99 MMOL/L (ref 99–110)
CO2 SERPL-SCNC: 28 MMOL/L (ref 21–32)
CREAT SERPL-MCNC: 0.8 MG/DL (ref 0.6–1.2)
DEPRECATED RDW RBC AUTO: 20.1 % (ref 12.4–15.4)
EKG ATRIAL RATE: 87 BPM
EKG DIAGNOSIS: NORMAL
EKG P AXIS: 52 DEGREES
EKG P-R INTERVAL: 140 MS
EKG Q-T INTERVAL: 362 MS
EKG QRS DURATION: 84 MS
EKG QTC CALCULATION (BAZETT): 435 MS
EKG R AXIS: -1 DEGREES
EKG T AXIS: 39 DEGREES
EKG VENTRICULAR RATE: 87 BPM
EOSINOPHIL # BLD: 0.2 K/UL (ref 0–0.6)
EOSINOPHIL NFR BLD: 2.6 %
FERRITIN SERPL IA-MCNC: 42.3 NG/ML (ref 15–150)
FOLATE SERPL-MCNC: 12.68 NG/ML (ref 4.78–24.2)
GFR SERPLBLD CREATININE-BSD FMLA CKD-EPI: >60 ML/MIN/{1.73_M2}
GLUCOSE BLD-MCNC: 180 MG/DL (ref 70–99)
GLUCOSE BLD-MCNC: 202 MG/DL (ref 70–99)
GLUCOSE BLD-MCNC: 221 MG/DL (ref 70–99)
GLUCOSE BLD-MCNC: 234 MG/DL (ref 70–99)
GLUCOSE SERPL-MCNC: 179 MG/DL (ref 70–99)
HCT VFR BLD AUTO: 28.2 % (ref 36–48)
HCT VFR BLD AUTO: 30.3 % (ref 36–48)
HGB BLD-MCNC: 8.6 G/DL (ref 12–16)
HGB BLD-MCNC: 8.8 G/DL (ref 12–16)
LYMPHOCYTES # BLD: 1.2 K/UL (ref 1–5.1)
LYMPHOCYTES NFR BLD: 19.2 %
MCH RBC QN AUTO: 24 PG (ref 26–34)
MCHC RBC AUTO-ENTMCNC: 30.6 G/DL (ref 31–36)
MCV RBC AUTO: 78.5 FL (ref 80–100)
MONOCYTES # BLD: 0.5 K/UL (ref 0–1.3)
MONOCYTES NFR BLD: 7.6 %
NEUTROPHILS # BLD: 4.2 K/UL (ref 1.7–7.7)
NEUTROPHILS NFR BLD: 70 %
PERFORMED ON: ABNORMAL
PLATELET # BLD AUTO: 127 K/UL (ref 135–450)
PMV BLD AUTO: 9.5 FL (ref 5–10.5)
POTASSIUM SERPL-SCNC: 3.6 MMOL/L (ref 3.5–5.1)
RBC # BLD AUTO: 3.59 M/UL (ref 4–5.2)
SODIUM SERPL-SCNC: 139 MMOL/L (ref 136–145)
VIT B12 SERPL-MCNC: 764 PG/ML (ref 211–911)
WBC # BLD AUTO: 6 K/UL (ref 4–11)

## 2024-02-11 PROCEDURE — 36415 COLL VENOUS BLD VENIPUNCTURE: CPT

## 2024-02-11 PROCEDURE — 6360000002 HC RX W HCPCS: Performed by: INTERNAL MEDICINE

## 2024-02-11 PROCEDURE — 93010 ELECTROCARDIOGRAM REPORT: CPT | Performed by: INTERNAL MEDICINE

## 2024-02-11 PROCEDURE — 85014 HEMATOCRIT: CPT

## 2024-02-11 PROCEDURE — 94640 AIRWAY INHALATION TREATMENT: CPT

## 2024-02-11 PROCEDURE — 6370000000 HC RX 637 (ALT 250 FOR IP): Performed by: NURSE PRACTITIONER

## 2024-02-11 PROCEDURE — 85025 COMPLETE CBC W/AUTO DIFF WBC: CPT

## 2024-02-11 PROCEDURE — 2580000003 HC RX 258: Performed by: INTERNAL MEDICINE

## 2024-02-11 PROCEDURE — 2700000000 HC OXYGEN THERAPY PER DAY

## 2024-02-11 PROCEDURE — 6370000000 HC RX 637 (ALT 250 FOR IP): Performed by: INTERNAL MEDICINE

## 2024-02-11 PROCEDURE — 85018 HEMOGLOBIN: CPT

## 2024-02-11 PROCEDURE — C9113 INJ PANTOPRAZOLE SODIUM, VIA: HCPCS | Performed by: INTERNAL MEDICINE

## 2024-02-11 PROCEDURE — 94761 N-INVAS EAR/PLS OXIMETRY MLT: CPT

## 2024-02-11 PROCEDURE — 2060000000 HC ICU INTERMEDIATE R&B

## 2024-02-11 PROCEDURE — 80048 BASIC METABOLIC PNL TOTAL CA: CPT

## 2024-02-11 RX ORDER — CARVEDILOL 3.12 MG/1
3.12 TABLET ORAL 2 TIMES DAILY
Status: DISCONTINUED | OUTPATIENT
Start: 2024-02-12 | End: 2024-02-13 | Stop reason: HOSPADM

## 2024-02-11 RX ORDER — LIDOCAINE 4 G/G
1 PATCH TOPICAL DAILY
Status: DISCONTINUED | OUTPATIENT
Start: 2024-02-11 | End: 2024-02-13 | Stop reason: HOSPADM

## 2024-02-11 RX ADMIN — METHOCARBAMOL 500 MG: 500 TABLET ORAL at 20:35

## 2024-02-11 RX ADMIN — METHOCARBAMOL 500 MG: 500 TABLET ORAL at 07:42

## 2024-02-11 RX ADMIN — NIACIN 500 MG: 500 TABLET, EXTENDED RELEASE ORAL at 21:15

## 2024-02-11 RX ADMIN — SODIUM CHLORIDE, PRESERVATIVE FREE 10 ML: 5 INJECTION INTRAVENOUS at 20:34

## 2024-02-11 RX ADMIN — MONTELUKAST SODIUM 10 MG: 10 TABLET, FILM COATED ORAL at 20:35

## 2024-02-11 RX ADMIN — ASPIRIN 81 MG: 81 TABLET, COATED ORAL at 07:42

## 2024-02-11 RX ADMIN — EMPAGLIFLOZIN 25 MG: 10 TABLET, FILM COATED ORAL at 07:41

## 2024-02-11 RX ADMIN — ARIPIPRAZOLE 5 MG: 5 TABLET ORAL at 07:46

## 2024-02-11 RX ADMIN — INSULIN GLARGINE 70 UNITS: 100 INJECTION, SOLUTION SUBCUTANEOUS at 20:35

## 2024-02-11 RX ADMIN — SODIUM CHLORIDE, PRESERVATIVE FREE 10 ML: 5 INJECTION INTRAVENOUS at 07:42

## 2024-02-11 RX ADMIN — ROSUVASTATIN CALCIUM 40 MG: 40 TABLET, COATED ORAL at 20:35

## 2024-02-11 RX ADMIN — TIOTROPIUM BROMIDE INHALATION SPRAY 1 PUFF: 3.12 SPRAY, METERED RESPIRATORY (INHALATION) at 08:23

## 2024-02-11 RX ADMIN — METHOCARBAMOL 500 MG: 500 TABLET ORAL at 13:35

## 2024-02-11 RX ADMIN — ACETAMINOPHEN 650 MG: 325 TABLET ORAL at 07:46

## 2024-02-11 RX ADMIN — PANTOPRAZOLE SODIUM 40 MG: 40 INJECTION, POWDER, FOR SOLUTION INTRAVENOUS at 20:35

## 2024-02-11 RX ADMIN — DOCUSATE SODIUM 100 MG: 100 CAPSULE, LIQUID FILLED ORAL at 07:42

## 2024-02-11 RX ADMIN — QUETIAPINE FUMARATE 100 MG: 100 TABLET ORAL at 07:42

## 2024-02-11 RX ADMIN — QUETIAPINE FUMARATE 200 MG: 100 TABLET ORAL at 20:35

## 2024-02-11 RX ADMIN — INSULIN GLARGINE 70 UNITS: 100 INJECTION, SOLUTION SUBCUTANEOUS at 07:45

## 2024-02-11 RX ADMIN — DOCUSATE SODIUM 100 MG: 100 CAPSULE, LIQUID FILLED ORAL at 20:35

## 2024-02-11 RX ADMIN — CARVEDILOL 6.25 MG: 6.25 TABLET, FILM COATED ORAL at 07:42

## 2024-02-11 RX ADMIN — INSULIN LISPRO 4 UNITS: 100 INJECTION, SOLUTION INTRAVENOUS; SUBCUTANEOUS at 18:01

## 2024-02-11 RX ADMIN — INSULIN LISPRO 4 UNITS: 100 INJECTION, SOLUTION INTRAVENOUS; SUBCUTANEOUS at 13:34

## 2024-02-11 RX ADMIN — PANTOPRAZOLE SODIUM 40 MG: 40 INJECTION, POWDER, FOR SOLUTION INTRAVENOUS at 07:41

## 2024-02-11 RX ADMIN — ACETAMINOPHEN 650 MG: 325 TABLET ORAL at 21:15

## 2024-02-12 ENCOUNTER — ANESTHESIA EVENT (OUTPATIENT)
Dept: ENDOSCOPY | Age: 74
DRG: 378 | End: 2024-02-12
Payer: MEDICARE

## 2024-02-12 ENCOUNTER — ANESTHESIA (OUTPATIENT)
Dept: ENDOSCOPY | Age: 74
DRG: 378 | End: 2024-02-12
Payer: MEDICARE

## 2024-02-12 LAB
ANION GAP SERPL CALCULATED.3IONS-SCNC: 10 MMOL/L (ref 3–16)
BASOPHILS # BLD: 0 K/UL (ref 0–0.2)
BASOPHILS NFR BLD: 0.5 %
BUN SERPL-MCNC: 15 MG/DL (ref 7–20)
CALCIUM SERPL-MCNC: 9.1 MG/DL (ref 8.3–10.6)
CHLORIDE SERPL-SCNC: 102 MMOL/L (ref 99–110)
CO2 SERPL-SCNC: 27 MMOL/L (ref 21–32)
CREAT SERPL-MCNC: 0.7 MG/DL (ref 0.6–1.2)
DEPRECATED RDW RBC AUTO: 21 % (ref 12.4–15.4)
EOSINOPHIL # BLD: 0.2 K/UL (ref 0–0.6)
EOSINOPHIL NFR BLD: 2.9 %
GFR SERPLBLD CREATININE-BSD FMLA CKD-EPI: >60 ML/MIN/{1.73_M2}
GLUCOSE BLD-MCNC: 145 MG/DL (ref 70–99)
GLUCOSE BLD-MCNC: 158 MG/DL (ref 70–99)
GLUCOSE BLD-MCNC: 236 MG/DL (ref 70–99)
GLUCOSE BLD-MCNC: 275 MG/DL (ref 70–99)
GLUCOSE BLD-MCNC: 278 MG/DL (ref 70–99)
GLUCOSE SERPL-MCNC: 155 MG/DL (ref 70–99)
HCT VFR BLD AUTO: 29.1 % (ref 36–48)
HGB BLD-MCNC: 8.8 G/DL (ref 12–16)
LYMPHOCYTES # BLD: 1.2 K/UL (ref 1–5.1)
LYMPHOCYTES NFR BLD: 21.1 %
MAGNESIUM SERPL-MCNC: 2.4 MG/DL (ref 1.8–2.4)
MCH RBC QN AUTO: 23.8 PG (ref 26–34)
MCHC RBC AUTO-ENTMCNC: 30.1 G/DL (ref 31–36)
MCV RBC AUTO: 79.2 FL (ref 80–100)
MONOCYTES # BLD: 0.4 K/UL (ref 0–1.3)
MONOCYTES NFR BLD: 7.2 %
NEUTROPHILS # BLD: 3.9 K/UL (ref 1.7–7.7)
NEUTROPHILS NFR BLD: 68.3 %
PATH INTERP BLD-IMP: NORMAL
PERFORMED ON: ABNORMAL
PHOSPHATE SERPL-MCNC: 3.9 MG/DL (ref 2.5–4.9)
PLATELET # BLD AUTO: 133 K/UL (ref 135–450)
PMV BLD AUTO: 9.7 FL (ref 5–10.5)
POTASSIUM SERPL-SCNC: 3.9 MMOL/L (ref 3.5–5.1)
RBC # BLD AUTO: 3.68 M/UL (ref 4–5.2)
SODIUM SERPL-SCNC: 139 MMOL/L (ref 136–145)
WBC # BLD AUTO: 5.7 K/UL (ref 4–11)

## 2024-02-12 PROCEDURE — 7100000000 HC PACU RECOVERY - FIRST 15 MIN: Performed by: INTERNAL MEDICINE

## 2024-02-12 PROCEDURE — 80048 BASIC METABOLIC PNL TOTAL CA: CPT

## 2024-02-12 PROCEDURE — 6370000000 HC RX 637 (ALT 250 FOR IP): Performed by: INTERNAL MEDICINE

## 2024-02-12 PROCEDURE — 3609013200 HC EGD W/ ABLATION: Performed by: INTERNAL MEDICINE

## 2024-02-12 PROCEDURE — 94640 AIRWAY INHALATION TREATMENT: CPT

## 2024-02-12 PROCEDURE — 84100 ASSAY OF PHOSPHORUS: CPT

## 2024-02-12 PROCEDURE — 6360000002 HC RX W HCPCS: Performed by: INTERNAL MEDICINE

## 2024-02-12 PROCEDURE — 3700000001 HC ADD 15 MINUTES (ANESTHESIA): Performed by: INTERNAL MEDICINE

## 2024-02-12 PROCEDURE — C9113 INJ PANTOPRAZOLE SODIUM, VIA: HCPCS | Performed by: INTERNAL MEDICINE

## 2024-02-12 PROCEDURE — 2500000003 HC RX 250 WO HCPCS: Performed by: NURSE ANESTHETIST, CERTIFIED REGISTERED

## 2024-02-12 PROCEDURE — 3700000000 HC ANESTHESIA ATTENDED CARE: Performed by: INTERNAL MEDICINE

## 2024-02-12 PROCEDURE — 94761 N-INVAS EAR/PLS OXIMETRY MLT: CPT

## 2024-02-12 PROCEDURE — 36415 COLL VENOUS BLD VENIPUNCTURE: CPT

## 2024-02-12 PROCEDURE — 2700000000 HC OXYGEN THERAPY PER DAY

## 2024-02-12 PROCEDURE — 83735 ASSAY OF MAGNESIUM: CPT

## 2024-02-12 PROCEDURE — 2580000003 HC RX 258: Performed by: INTERNAL MEDICINE

## 2024-02-12 PROCEDURE — 3609014100 HC ENTEROSCOPY > 2ND PRTN W/CONTROL BLEEDING: Performed by: INTERNAL MEDICINE

## 2024-02-12 PROCEDURE — 2060000000 HC ICU INTERMEDIATE R&B

## 2024-02-12 PROCEDURE — 7100000001 HC PACU RECOVERY - ADDTL 15 MIN: Performed by: INTERNAL MEDICINE

## 2024-02-12 PROCEDURE — 2709999900 HC NON-CHARGEABLE SUPPLY: Performed by: INTERNAL MEDICINE

## 2024-02-12 PROCEDURE — 2720000010 HC SURG SUPPLY STERILE: Performed by: INTERNAL MEDICINE

## 2024-02-12 PROCEDURE — 0W3P8ZZ CONTROL BLEEDING IN GASTROINTESTINAL TRACT, VIA NATURAL OR ARTIFICIAL OPENING ENDOSCOPIC: ICD-10-PCS | Performed by: INTERNAL MEDICINE

## 2024-02-12 PROCEDURE — 6360000002 HC RX W HCPCS: Performed by: NURSE ANESTHETIST, CERTIFIED REGISTERED

## 2024-02-12 PROCEDURE — 85025 COMPLETE CBC W/AUTO DIFF WBC: CPT

## 2024-02-12 PROCEDURE — 6370000000 HC RX 637 (ALT 250 FOR IP): Performed by: NURSE PRACTITIONER

## 2024-02-12 RX ORDER — ROCURONIUM BROMIDE 10 MG/ML
INJECTION, SOLUTION INTRAVENOUS PRN
Status: DISCONTINUED | OUTPATIENT
Start: 2024-02-12 | End: 2024-02-12 | Stop reason: SDUPTHER

## 2024-02-12 RX ORDER — DEXAMETHASONE SODIUM PHOSPHATE 4 MG/ML
INJECTION, SOLUTION INTRA-ARTICULAR; INTRALESIONAL; INTRAMUSCULAR; INTRAVENOUS; SOFT TISSUE PRN
Status: DISCONTINUED | OUTPATIENT
Start: 2024-02-12 | End: 2024-02-12 | Stop reason: SDUPTHER

## 2024-02-12 RX ORDER — LIDOCAINE HYDROCHLORIDE 20 MG/ML
INJECTION, SOLUTION INFILTRATION; PERINEURAL PRN
Status: DISCONTINUED | OUTPATIENT
Start: 2024-02-12 | End: 2024-02-12 | Stop reason: SDUPTHER

## 2024-02-12 RX ORDER — PHENYLEPHRINE HCL IN 0.9% NACL 1 MG/10 ML
SYRINGE (ML) INTRAVENOUS PRN
Status: DISCONTINUED | OUTPATIENT
Start: 2024-02-12 | End: 2024-02-12 | Stop reason: SDUPTHER

## 2024-02-12 RX ORDER — PROPOFOL 10 MG/ML
INJECTION, EMULSION INTRAVENOUS PRN
Status: DISCONTINUED | OUTPATIENT
Start: 2024-02-12 | End: 2024-02-12 | Stop reason: SDUPTHER

## 2024-02-12 RX ORDER — ONDANSETRON 2 MG/ML
INJECTION INTRAMUSCULAR; INTRAVENOUS PRN
Status: DISCONTINUED | OUTPATIENT
Start: 2024-02-12 | End: 2024-02-12 | Stop reason: SDUPTHER

## 2024-02-12 RX ADMIN — DOCUSATE SODIUM 100 MG: 100 CAPSULE, LIQUID FILLED ORAL at 09:36

## 2024-02-12 RX ADMIN — INSULIN LISPRO 8 UNITS: 100 INJECTION, SOLUTION INTRAVENOUS; SUBCUTANEOUS at 17:39

## 2024-02-12 RX ADMIN — PANTOPRAZOLE SODIUM 40 MG: 40 INJECTION, POWDER, FOR SOLUTION INTRAVENOUS at 20:29

## 2024-02-12 RX ADMIN — SUGAMMADEX 200 MG: 100 INJECTION, SOLUTION INTRAVENOUS at 14:11

## 2024-02-12 RX ADMIN — NIACIN 500 MG: 500 TABLET, EXTENDED RELEASE ORAL at 20:37

## 2024-02-12 RX ADMIN — QUETIAPINE FUMARATE 200 MG: 100 TABLET ORAL at 20:30

## 2024-02-12 RX ADMIN — PROPOFOL 200 MG: 10 INJECTION, EMULSION INTRAVENOUS at 13:44

## 2024-02-12 RX ADMIN — SODIUM CHLORIDE, PRESERVATIVE FREE 10 ML: 5 INJECTION INTRAVENOUS at 09:36

## 2024-02-12 RX ADMIN — METHOCARBAMOL 500 MG: 500 TABLET ORAL at 17:38

## 2024-02-12 RX ADMIN — TIOTROPIUM BROMIDE INHALATION SPRAY 1 PUFF: 3.12 SPRAY, METERED RESPIRATORY (INHALATION) at 12:28

## 2024-02-12 RX ADMIN — PANTOPRAZOLE SODIUM 40 MG: 40 INJECTION, POWDER, FOR SOLUTION INTRAVENOUS at 09:36

## 2024-02-12 RX ADMIN — ROSUVASTATIN CALCIUM 40 MG: 40 TABLET, COATED ORAL at 20:30

## 2024-02-12 RX ADMIN — GLUCAGON HYDROCHLORIDE 0.5 MG: KIT at 14:01

## 2024-02-12 RX ADMIN — CARVEDILOL 3.12 MG: 3.12 TABLET, FILM COATED ORAL at 09:36

## 2024-02-12 RX ADMIN — QUETIAPINE FUMARATE 100 MG: 100 TABLET ORAL at 09:36

## 2024-02-12 RX ADMIN — LIDOCAINE HYDROCHLORIDE 100 MG: 20 INJECTION, SOLUTION INFILTRATION; PERINEURAL at 13:44

## 2024-02-12 RX ADMIN — ARIPIPRAZOLE 5 MG: 5 TABLET ORAL at 09:36

## 2024-02-12 RX ADMIN — Medication 100 MCG: at 14:09

## 2024-02-12 RX ADMIN — ONDANSETRON 4 MG: 2 INJECTION INTRAMUSCULAR; INTRAVENOUS at 14:11

## 2024-02-12 RX ADMIN — EMPAGLIFLOZIN 25 MG: 10 TABLET, FILM COATED ORAL at 10:02

## 2024-02-12 RX ADMIN — DEXAMETHASONE SODIUM PHOSPHATE 8 MG: 4 INJECTION, SOLUTION INTRAMUSCULAR; INTRAVENOUS at 13:48

## 2024-02-12 RX ADMIN — METHOCARBAMOL 500 MG: 500 TABLET ORAL at 09:36

## 2024-02-12 RX ADMIN — DOCUSATE SODIUM 100 MG: 100 CAPSULE, LIQUID FILLED ORAL at 20:30

## 2024-02-12 RX ADMIN — METHOCARBAMOL 500 MG: 500 TABLET ORAL at 20:30

## 2024-02-12 RX ADMIN — SODIUM CHLORIDE: 9 INJECTION, SOLUTION INTRAVENOUS at 13:41

## 2024-02-12 RX ADMIN — MONTELUKAST SODIUM 10 MG: 10 TABLET, FILM COATED ORAL at 20:30

## 2024-02-12 RX ADMIN — INSULIN GLARGINE 70 UNITS: 100 INJECTION, SOLUTION SUBCUTANEOUS at 20:29

## 2024-02-12 RX ADMIN — ACETAMINOPHEN 650 MG: 325 TABLET ORAL at 20:30

## 2024-02-12 RX ADMIN — CARVEDILOL 3.12 MG: 3.12 TABLET, FILM COATED ORAL at 20:30

## 2024-02-12 RX ADMIN — SODIUM CHLORIDE, PRESERVATIVE FREE 10 ML: 5 INJECTION INTRAVENOUS at 20:29

## 2024-02-12 RX ADMIN — ROCURONIUM BROMIDE 50 MG: 10 INJECTION, SOLUTION INTRAVENOUS at 13:44

## 2024-02-12 NOTE — PROCEDURES
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    Indications: This is a 73 y.o. year old female who has been anemic she has been having problems with this anemia she had an upper and lower endoscopy were doing an enteroscopy today to evaluate.    Description of Procedure:  Informed consent was obtained from the patient after explanation of indications, benefits and possible risks and complications of the procedure.  The patient was then taken to the endoscopy suite, placed in the left lateral decubitus position and the above IV sedation was administrered.    The Edgeio video pediatric colonoscope was passed through the hypopharynx     Posterior pharynx was normal  Esophagus was normal  Stomach there was some what appeared to be a small area that was bleeding in the fundus and we washed with water all of a sudden it just started bleeding and when I was able to get a good look it was a gastric AVM so we were able to because it was bleeding profusely #1 placed a clip and then #2 we cauterized with APC and the stomach mode    Duodenum was normal    Jejunum we made her way all the way to the middle portion of the jejunum and we gave the patient glucagon slowly moving our scope we found an atrial venous malformation somewhere in the more proximal jejunum and we are able to use APC small bowel mode and cauterize this      Gastric or Duodenal ulcer present: No    The patient tolerated the procedure well and was taken to the post anesthesia care unit in good condition.  There

## 2024-02-12 NOTE — H&P
Gastroenterology Note             Pre-operative History and Physical    Patient: Brandi Gomez  : 1950  CSN:     History Obtained From:  patient and/or guardian.     HISTORY OF PRESENT ILLNESS:    The patient is a 73 y.o. female  here for Egd  And this is a 73-year-old female comes in today because she had an EGD and a colonoscopy and her bleeding source has not been found today were going to do an enteroscopy to evaluate and find out if we can see a bleeding source    She is an inpatient at [facility]    Past Medical History:    Past Medical History:   Diagnosis Date    Asthma     Back pain     DJD    Bipolar 1 disorder (HCC)     CAD (coronary artery disease)     Cancer (HCC)     Left breast CA    Cataract     CHF (congestive heart failure) (HCC)     HFpEF    Cirrhosis (HCC)     COPD (chronic obstructive pulmonary disease) (HCC)     Depression     Diabetes mellitus (HCC)     GERD (gastroesophageal reflux disease)     Hx of blood clots     PE 2018    Hypercholesteremia     Hyperlipidemia     Hypertension     Morbid obesity with body mass index (BMI) greater than or equal to 50 (HCC)     Osteoporosis     Pancytopenia (HCC)     Right rib fracture 10/20/2022    RSV bronchitis     Sciatica     Tachycardia      Past Surgical History:    Past Surgical History:   Procedure Laterality Date    APPENDECTOMY      BACK SURGERY      BREAST SURGERY Left     Lumpectomy and lymph node removal    CARDIAC CATHETERIZATION  2023    with stent placement    CHOLECYSTECTOMY      COLONOSCOPY      ENDOMETRIAL ABLATION      EYE SURGERY Bilateral     cataract removal    HERNIA REPAIR      LEG SURGERY      LEFT TIB FIB FX, SURGERY X2    UPPER GASTROINTESTINAL ENDOSCOPY N/A 2023    EGD BIOPSY performed by Porfirio Navas MD at [facility] ENDOSCOPY     Medications Prior to Admission:   No current facility-administered medications on file prior to encounter.     Current Outpatient 
mouth daily 90 tablet 1    furosemide (LASIX) 40 MG tablet Take 2 tablets by mouth daily Take 1.5 tablets every day. (Patient taking differently: Take 1.5 tablets by mouth daily Take 1.5 tablets every day. Patient may take an extra half tablet daily for additional swelling.) 60 tablet 0    Melatonin 10 MG TABS Take 10 mg by mouth nightly as needed      tiotropium (SPIRIVA RESPIMAT) 1.25 MCG/ACT AERS inhaler Inhale 2 puffs into the lungs daily      rosuvastatin (CRESTOR) 40 MG tablet Take 1 tablet by mouth nightly 90 tablet 3    carvedilol (COREG) 6.25 MG tablet Take 1 tablet by mouth 2 times daily (with meals) 180 tablet 3    clopidogrel (PLAVIX) 75 MG tablet Take 1 tablet by mouth daily 90 tablet 3    docusate sodium (COLACE) 100 MG capsule Take 1 capsule by mouth 2 times daily      OXYGEN Inhale 2 L into the lungs continuous      aspirin 81 MG chewable tablet Take 1 tablet by mouth daily 30 tablet 3    vitamin B-12 (CYANOCOBALAMIN) 100 MCG tablet Take 10 tablets by mouth Once a week on Wednesdays      LEVEMIR FLEXTOUCH 100 UNIT/ML injection pen Inject 70 Units into the skin 2 times daily      niacin 500 MG CR capsule Take 1 capsule by mouth nightly      ARIPiprazole (ABILIFY) 5 MG tablet Take 1 tablet by mouth daily Pt takes 5 mg      montelukast (SINGULAIR) 10 MG tablet Take 1 tablet by mouth nightly      QUEtiapine (SEROQUEL) 100 MG tablet Take 1 tablet by mouth daily      albuterol (PROVENTIL HFA;VENTOLIN HFA) 108 (90 BASE) MCG/ACT inhaler Inhale 2 puffs into the lungs every 6 hours as needed      QUEtiapine (SEROQUEL) 200 MG tablet Take 1 tablet by mouth at bedtime         Allergies:  Allergies   Allergen Reactions    Penicillins Swelling        Social History:  Patient Lives at home   reports that she has never smoked. She has been exposed to tobacco smoke. She has never used smokeless tobacco. She reports that she does not drink alcohol and does not use drugs.     Family History:  family history includes

## 2024-02-12 NOTE — PLAN OF CARE
Problem: Discharge Planning  Goal: Discharge to home or other facility with appropriate resources  Outcome: Progressing  Flowsheets (Taken 2/11/2024 2023)  Discharge to home or other facility with appropriate resources:   Identify barriers to discharge with patient and caregiver   Arrange for needed discharge resources and transportation as appropriate   Identify discharge learning needs (meds, wound care, etc)     Problem: Pain  Goal: Verbalizes/displays adequate comfort level or baseline comfort level  Outcome: Progressing     Problem: Safety - Adult  Goal: Free from fall injury  Outcome: Progressing

## 2024-02-12 NOTE — PLAN OF CARE
Problem: Discharge Planning  Goal: Discharge to home or other facility with appropriate resources  Outcome: Progressing  Flowsheets (Taken 2/12/2024 1758)  Discharge to home or other facility with appropriate resources:   Identify barriers to discharge with patient and caregiver   Identify discharge learning needs (meds, wound care, etc)   Refer to discharge planning if patient needs post-hospital services based on physician order or complex needs related to functional status, cognitive ability or social support system   Arrange for needed discharge resources and transportation as appropriate     Problem: Pain  Goal: Verbalizes/displays adequate comfort level or baseline comfort level  Outcome: Progressing  Flowsheets (Taken 2/12/2024 1758)  Verbalizes/displays adequate comfort level or baseline comfort level:   Encourage patient to monitor pain and request assistance   Administer analgesics based on type and severity of pain and evaluate response   Consider cultural and social influences on pain and pain management     Problem: Safety - Adult  Goal: Free from fall injury  Outcome: Progressing  Flowsheets (Taken 2/12/2024 1758)  Free From Fall Injury: Instruct family/caregiver on patient safety

## 2024-02-12 NOTE — ANESTHESIA PRE PROCEDURE
anesthetic. But in the event of unexpected catatstrophic events will not pursue heroic measures per patient wishes. )  Induction: intravenous.    MIPS: Prophylactic antiemetics administered and Postoperative trial extubation.  Anesthetic plan and risks discussed with patient.      Plan discussed with CRNA.                  Kevin Wang II, MD   2/12/2024          
[phone]

## 2024-02-12 NOTE — ANESTHESIA POSTPROCEDURE EVALUATION
Department of Anesthesiology  Postprocedure Note    Patient: Brandi Gomez  MRN: [id number]  YOB: 1950  Date of evaluation: 2/12/2024    Procedure Summary       Date: 02/12/24 Room / Location: [place] [address] / [facility]    Anesthesia Start: 1341 Anesthesia Stop: 1423    Procedures:       ENTEROSCOPY PUSH CONTROL HEMORRHAGE (Abdomen)      EGD ABLATION Jejunum and Gastric AVM (Abdomen) Diagnosis:       Iron deficiency anemia, unspecified iron deficiency anemia type      (Iron deficiency anemia, unspecified iron deficiency anemia type [D50.9])    Surgeons: Ramone Peguero MD Responsible Provider: ZACHARIAH Wang MD    Anesthesia Type: General ASA Status: 4            Anesthesia Type: General    Jennifer Phase I: Jennifer Score: 9    Jennifer Phase II:      Anesthesia Post Evaluation    Patient location during evaluation: PACU  Patient participation: complete - patient participated  Level of consciousness: awake and alert  Pain score: 0  Airway patency: patent  Nausea & Vomiting: no nausea  Cardiovascular status: blood pressure returned to baseline  Respiratory status: acceptable and nasal cannula  Hydration status: euvolemic  Pain management: adequate    No notable events documented.

## 2024-02-13 VITALS
RESPIRATION RATE: 20 BRPM | HEIGHT: 61 IN | TEMPERATURE: 98 F | HEART RATE: 77 BPM | SYSTOLIC BLOOD PRESSURE: 126 MMHG | BODY MASS INDEX: 46.26 KG/M2 | WEIGHT: 245 LBS | DIASTOLIC BLOOD PRESSURE: 64 MMHG | OXYGEN SATURATION: 100 %

## 2024-02-13 PROBLEM — K31.811: Status: ACTIVE | Noted: 2024-02-13

## 2024-02-13 LAB
ANION GAP SERPL CALCULATED.3IONS-SCNC: 9 MMOL/L (ref 3–16)
BASOPHILS # BLD: 0 K/UL (ref 0–0.2)
BASOPHILS NFR BLD: 0.2 %
BUN SERPL-MCNC: 14 MG/DL (ref 7–20)
CALCIUM SERPL-MCNC: 8.8 MG/DL (ref 8.3–10.6)
CHLORIDE SERPL-SCNC: 104 MMOL/L (ref 99–110)
CO2 SERPL-SCNC: 26 MMOL/L (ref 21–32)
CREAT SERPL-MCNC: 0.8 MG/DL (ref 0.6–1.2)
DEPRECATED RDW RBC AUTO: 20.8 % (ref 12.4–15.4)
EOSINOPHIL # BLD: 0 K/UL (ref 0–0.6)
EOSINOPHIL NFR BLD: 0.1 %
GFR SERPLBLD CREATININE-BSD FMLA CKD-EPI: >60 ML/MIN/{1.73_M2}
GLUCOSE BLD-MCNC: 177 MG/DL (ref 70–99)
GLUCOSE BLD-MCNC: 188 MG/DL (ref 70–99)
GLUCOSE SERPL-MCNC: 224 MG/DL (ref 70–99)
HCT VFR BLD AUTO: 27.3 % (ref 36–48)
HGB BLD-MCNC: 8.3 G/DL (ref 12–16)
LYMPHOCYTES # BLD: 0.6 K/UL (ref 1–5.1)
LYMPHOCYTES NFR BLD: 8.8 %
MCH RBC QN AUTO: 24.4 PG (ref 26–34)
MCHC RBC AUTO-ENTMCNC: 30.6 G/DL (ref 31–36)
MCV RBC AUTO: 79.8 FL (ref 80–100)
MONOCYTES # BLD: 0.3 K/UL (ref 0–1.3)
MONOCYTES NFR BLD: 5 %
NEUTROPHILS # BLD: 5.8 K/UL (ref 1.7–7.7)
NEUTROPHILS NFR BLD: 85.9 %
PERFORMED ON: ABNORMAL
PERFORMED ON: ABNORMAL
PLATELET # BLD AUTO: 123 K/UL (ref 135–450)
PMV BLD AUTO: 9.5 FL (ref 5–10.5)
POTASSIUM SERPL-SCNC: 4.6 MMOL/L (ref 3.5–5.1)
RBC # BLD AUTO: 3.42 M/UL (ref 4–5.2)
SODIUM SERPL-SCNC: 139 MMOL/L (ref 136–145)
WBC # BLD AUTO: 6.7 K/UL (ref 4–11)

## 2024-02-13 PROCEDURE — 6370000000 HC RX 637 (ALT 250 FOR IP): Performed by: INTERNAL MEDICINE

## 2024-02-13 PROCEDURE — 36415 COLL VENOUS BLD VENIPUNCTURE: CPT

## 2024-02-13 PROCEDURE — 94640 AIRWAY INHALATION TREATMENT: CPT

## 2024-02-13 PROCEDURE — 6360000002 HC RX W HCPCS: Performed by: INTERNAL MEDICINE

## 2024-02-13 PROCEDURE — C9113 INJ PANTOPRAZOLE SODIUM, VIA: HCPCS | Performed by: INTERNAL MEDICINE

## 2024-02-13 PROCEDURE — 80048 BASIC METABOLIC PNL TOTAL CA: CPT

## 2024-02-13 PROCEDURE — 2700000000 HC OXYGEN THERAPY PER DAY

## 2024-02-13 PROCEDURE — 2580000003 HC RX 258: Performed by: INTERNAL MEDICINE

## 2024-02-13 PROCEDURE — 94760 N-INVAS EAR/PLS OXIMETRY 1: CPT

## 2024-02-13 PROCEDURE — 85025 COMPLETE CBC W/AUTO DIFF WBC: CPT

## 2024-02-13 PROCEDURE — 6370000000 HC RX 637 (ALT 250 FOR IP): Performed by: NURSE PRACTITIONER

## 2024-02-13 RX ORDER — FUROSEMIDE 20 MG/1
20 TABLET ORAL DAILY
Qty: 20 TABLET | Refills: 0 | Status: SHIPPED | OUTPATIENT
Start: 2024-02-13

## 2024-02-13 RX ORDER — CARVEDILOL 3.12 MG/1
3.12 TABLET ORAL 2 TIMES DAILY
Qty: 60 TABLET | Refills: 3 | Status: SHIPPED | OUTPATIENT
Start: 2024-02-13

## 2024-02-13 RX ORDER — PANTOPRAZOLE SODIUM 40 MG/1
40 TABLET, DELAYED RELEASE ORAL
Qty: 90 TABLET | Refills: 1 | Status: SHIPPED | OUTPATIENT
Start: 2024-02-13

## 2024-02-13 RX ADMIN — EMPAGLIFLOZIN 25 MG: 10 TABLET, FILM COATED ORAL at 11:43

## 2024-02-13 RX ADMIN — METHOCARBAMOL 500 MG: 500 TABLET ORAL at 08:57

## 2024-02-13 RX ADMIN — DOCUSATE SODIUM 100 MG: 100 CAPSULE, LIQUID FILLED ORAL at 08:57

## 2024-02-13 RX ADMIN — QUETIAPINE FUMARATE 100 MG: 100 TABLET ORAL at 08:57

## 2024-02-13 RX ADMIN — INSULIN GLARGINE 70 UNITS: 100 INJECTION, SOLUTION SUBCUTANEOUS at 08:57

## 2024-02-13 RX ADMIN — METHOCARBAMOL 500 MG: 500 TABLET ORAL at 16:16

## 2024-02-13 RX ADMIN — CARVEDILOL 3.12 MG: 3.12 TABLET, FILM COATED ORAL at 08:57

## 2024-02-13 RX ADMIN — SODIUM CHLORIDE, PRESERVATIVE FREE 10 ML: 5 INJECTION INTRAVENOUS at 08:58

## 2024-02-13 RX ADMIN — TIOTROPIUM BROMIDE INHALATION SPRAY 1 PUFF: 3.12 SPRAY, METERED RESPIRATORY (INHALATION) at 09:56

## 2024-02-13 RX ADMIN — ASPIRIN 81 MG: 81 TABLET, COATED ORAL at 08:57

## 2024-02-13 RX ADMIN — ARIPIPRAZOLE 5 MG: 5 TABLET ORAL at 08:57

## 2024-02-13 RX ADMIN — PANTOPRAZOLE SODIUM 40 MG: 40 INJECTION, POWDER, FOR SOLUTION INTRAVENOUS at 08:57

## 2024-02-13 NOTE — CARE COORDINATION
02/13/24 1544   IMM Letter   IMM Letter given to Patient/Family/Significant other/Guardian/POA/by: IMM Letter given to Patient by SW. Patient signed IMM Letter and is in agreement with D/C home even with less than 4 hours notice of IMM.   IMM Letter date given: 02/13/24   IMM Letter time given: 1530         Electronically signed by JOHN Núñez on 2/13/2024 at 3:45 PM    
Case Management -  Discharge Note      Patient Name: Brandi Gomez                   YOB: 1950            Readmission Risk (Low < 19, Mod (19-27), High > 27): Readmission Risk Score: 22.1    Current PCP: Pricilla Duran MD    ([facility]) Important Message from Medicare:    Date: 02/13/2024    PT AM-PAC:   /24  OT AM-PAC:   /24    Financial    Payor: [employer] MEDICARE / Plan: [employer] GOLD PLUS HMO / Product Type: *No Product type* /     Pharmacy:  Potential assistance Purchasing Medications:    Meds-to-Beds request:        [facility] - [place], [place] - [address] - P [phone] - F [phone]  [address] [address]  Phone: [phone] Fax: [phone]    [facility] [id number] - [place], [place] - [address] -  [phone] - F [phone]  [address] [id number]  Phone: [phone] Fax: [phone]    [facility] - [place], [place] - [address] - P [phone] - F [phone]  [address] [address]  Phone: [phone] Fax: [phone]      Notes:    Additional Case Management Notes: Patient's sister in law to transport home. Patient has her portable oxygen in the room.       Electronically signed by JOHN Núñez on 2/13/2024 at 3:45 PM          
plan. Freedom of choice list with basic dialogue that supports the patient's individualized plan of care/goals and shares the quality data associated with the providers was provided to:     Patient Representative Name:       The Patient and/or Patient Representative Agree with the Discharge Plan?      Suzan Li  Case Management Department  Ph: [phone]

## 2024-02-13 NOTE — DISCHARGE SUMMARY
found for: \"TROPONINT\"  Lactic Acid: No results for input(s): \"LACTA\" in the last 72 hours.  BNP: No results for input(s): \"PROBNP\" in the last 72 hours.  UA:  Lab Results   Component Value Date/Time    NITRU Negative 05/26/2023 04:11 PM    COLORU Yellow 05/26/2023 04:11 PM    PHUR 6.0 05/26/2023 04:11 PM    WBCUA 14 04/30/2023 07:40 PM    RBCUA 0 04/30/2023 07:40 PM    BACTERIA None Seen 04/30/2023 07:40 PM    CLARITYU Clear 05/26/2023 04:11 PM    SPECGRAV 1.015 05/26/2023 04:11 PM    LEUKOCYTESUR Negative 05/26/2023 04:11 PM    UROBILINOGEN 0.2 05/26/2023 04:11 PM    BILIRUBINUR Negative 05/26/2023 04:11 PM    BLOODU Negative 05/26/2023 04:11 PM    GLUCOSEU >=1000 05/26/2023 04:11 PM    KETUA Negative 05/26/2023 04:11 PM     Urine Cultures:   Lab Results   Component Value Date/Time    LABURIN  03/26/2023 11:30 AM     >50,000 CFU/ml  Multiple organisms isolated, no predominance. Culture  indicates probable contamination. Please review colony count  and clinical indications to determine if a repeat culture is  necessary. No further workup to be done.       Blood Cultures: No results found for: \"BC\"  No results found for: \"BLOODCULT2\"  Organism:   Lab Results   Component Value Date/Time    ORG Coronavirus 229E detected by PCR 03/20/2023 06:36 PM       Time Spent Discharging patient 35 minutes    Electronically signed by Aspen Villanueva MD on 2/13/2024 at 2:14 PM

## 2024-02-13 NOTE — DISCHARGE INSTR - COC
with cardiologist as scheduled.    Physician Certification: I certify the above information and transfer of Brandi Gomez  is necessary for the continuing treatment of the diagnosis listed and that she requires Home Care for greater 30 days.     Update Admission H&P: No change in H&P    PHYSICIAN SIGNATURE:  Electronically signed by Aspen Villanueva MD on 2/13/24 at 2:09 PM EST

## 2024-02-13 NOTE — DISCHARGE INSTRUCTIONS
Repeat CBC in 1 week with PCP.  Monitor blood pressures closely at home.  Follow-up with cardiologist as scheduled.

## 2024-02-13 NOTE — PLAN OF CARE
Problem: Discharge Planning  Goal: Discharge to home or other facility with appropriate resources  2/13/2024 0107 by Nicolasa Tobin RN  Outcome: Progressing  Flowsheets (Taken 2/12/2024 2024)  Discharge to home or other facility with appropriate resources:   Identify barriers to discharge with patient and caregiver   Arrange for needed discharge resources and transportation as appropriate   Identify discharge learning needs (meds, wound care, etc)  2/12/2024 1758 by Sandrine Edmonds RN  Outcome: Progressing  Flowsheets (Taken 2/12/2024 1758)  Discharge to home or other facility with appropriate resources:   Identify barriers to discharge with patient and caregiver   Identify discharge learning needs (meds, wound care, etc)   Refer to discharge planning if patient needs post-hospital services based on physician order or complex needs related to functional status, cognitive ability or social support system   Arrange for needed discharge resources and transportation as appropriate     Problem: Pain  Goal: Verbalizes/displays adequate comfort level or baseline comfort level  2/13/2024 0107 by Nicolasa Tobin RN  Outcome: Progressing  2/12/2024 1758 by Sandrine Edmonds RN  Outcome: Progressing  Flowsheets (Taken 2/12/2024 1758)  Verbalizes/displays adequate comfort level or baseline comfort level:   Encourage patient to monitor pain and request assistance   Administer analgesics based on type and severity of pain and evaluate response   Consider cultural and social influences on pain and pain management     Problem: Safety - Adult  Goal: Free from fall injury  2/13/2024 0107 by Nicolasa Tobin RN  Outcome: Progressing  2/12/2024 1758 by Sandrine Edmonds RN  Outcome: Progressing  Flowsheets (Taken 2/12/2024 1758)  Free From Fall Injury: Instruct family/caregiver on patient safety     Problem: Chronic Conditions and Co-morbidities  Goal: Patient's chronic conditions and co-morbidity symptoms are monitored and maintained

## 2024-02-13 NOTE — PROGRESS NOTES
Gastroenterology Progress Note    Brandi Gomez is a 73 y.o. female patient.  1. Symptomatic anemia        Admission Date: 2024  Hospital Day: Hospital Day: 3  Attending: Aspen Villanueva MD  Date of service: 24    SUBJECTIVE:    No complaints  Stools are normal colored  Feels dizzy and light headed     ROS:  Cardiovascular ROS: no chest pain or dyspnea on exertion  Gastrointestinal ROS: see above  Respiratory ROS: no cough, shortness of breath, or wheezing    Physical    VITALS:  BP (!) 103/59   Pulse 75   Temp 97.7 °F (36.5 °C) (Oral)   Resp 18   Ht 1.549 m (5' 1\")   Wt 118.6 kg (261 lb 8 oz)   LMP 2000 (Approximate)   SpO2 99%   BMI 49.41 kg/m²   TEMPERATURE:  Current - Temp: 97.7 °F (36.5 °C); Max - Temp  Av.9 °F (36.6 °C)  Min: 97.7 °F (36.5 °C)  Max: 98.4 °F (36.9 °C)    NAD  RRR, Nl s1s2  Lungs CTA Bilaterally, normal effort  Abdomen soft, ND, NT, no HSM, Bowel sounds normal  AAOx3, No asterixis     Data      CBC:   Recent Labs     24  1541 24  1651 02/10/24  0340 02/10/24  0735 02/10/24  1941 24  0127 24  0701   WBC 5.0  --  6.5  --   --   --  6.0   RBC 2.97*  --  3.54*  --   --   --  3.59*   HGB 6.8*   < > 8.4*   < > 9.3* 8.8* 8.6*   HCT 22.5*   < > 27.7*   < > 31.2* 30.3* 28.2*   *  --  137  --   --   --  127*   MCV 75.8*  --  78.3*  --   --   --  78.5*   MCH 22.8*  --  23.7*  --   --   --  24.0*   MCHC 30.1*  --  30.3*  --   --   --  30.6*   RDW 20.3*  --  20.1*  --   --   --  20.1*    < > = values in this interval not displayed.        BMP:  Recent Labs     24  1541 02/10/24  0340 24  0701    138 139   K 4.5 4.6 3.6   CL 96* 101 99   CO2 30 26 28   BUN 14 15 19   CREATININE 0.7 0.9 0.8   CALCIUM 8.7 9.1 8.9   GLUCOSE 241* 197* 179*        Hepatic Function Panel:   Recent Labs     24  1541   AST 14*   ALT 11   BILITOT 0.5   ALKPHOS 76       No results for input(s): \"LIPASE\", \"AMYLASE\" in the last 72 hours.  Recent 
    Gastroenterology Progress Note    Brandi Gomez is a 73 y.o. female patient.  1. Symptomatic anemia        Admission Date: 2024  Hospital Day: Hospital Day: 5  Attending: Aspen Villanueva MD  Date of service: 24    SUBJECTIVE:   no black or bloody BMs.        Physical    VITALS:  /64   Pulse 77   Temp 98 °F (36.7 °C) (Oral)   Resp 20   Ht 1.549 m (5' 1\")   Wt 111.1 kg (245 lb)   LMP 2000 (Approximate)   SpO2 100%   BMI 46.29 kg/m²   TEMPERATURE:  Current - Temp: 98 °F (36.7 °C); Max - Temp  Av °F (36.7 °C)  Min: 97.4 °F (36.3 °C)  Max: 98.5 °F (36.9 °C)    Constitutional: Alert and oriented x 4. No acute distress.   Respiratory: Respirations nonlabored, no crepitus  GI: Abdomen nondistended, soft, and nontender.    Neurological: No focal deficits noted. No asterixis.      Data      CBC:   Recent Labs     24  0701 24  0508 24  0455   WBC 6.0 5.7 6.7   RBC 3.59* 3.68* 3.42*   HGB 8.6* 8.8* 8.3*   HCT 28.2* 29.1* 27.3*   * 133* 123*   MCV 78.5* 79.2* 79.8*   MCH 24.0* 23.8* 24.4*   MCHC 30.6* 30.1* 30.6*   RDW 20.1* 21.0* 20.8*          BMP:  Recent Labs     24  0701 24  0508 24  0455    139 139   K 3.6 3.9 4.6   CL 99 102 104   CO2 28 27 26   BUN 19 15 14   CREATININE 0.8 0.7 0.8   CALCIUM 8.9 9.1 8.8   GLUCOSE 179* 155* 224*             Assessment:        Acute on chronic iron deficiency anemia with melena. Previous EGD/colonoscopies were unremarkable. 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 Gastric AVM bled and was then clipped. Hgb stable today.      2.  CAD status post stent 2023 on dual antiplatelet therapy.  Last  Plavix intake was . Cardiology is ok with holding plavix but would  want to continue ASA     3.  CHF and COPD on home oxygen.  Of note, patient had breathing difficulties during her colonoscopy.  She requires general anesthesia for longer endoscopic procedure.   
    V2.0    [facility] Progress Note      Name:  Brandi Gomez /Age/Sex: 1950  (73 y.o. female)   MRN & CSN:  [id number] & [id number] Encounter Date/Time: 2024 3:53 PM EST   Location[facility]  [facility] PCP: Pricilla Duran MD     Attending:Aspen Villanueva MD       Hospital Day: 4    Assessment and Recommendations   Brandi Gomez is a 73 y.o. female with pmh of HFpEF, HLD, HTN, T2 DM, GERD, chronic anemia on outpatient iron infusions, bipolar disorder type I, obstructive sleep apnea/COPD with chronic hypoxia on 2 L oxygen, morbid obesity, CAD s/p PCI admitted for further evaluation of acute on chronic anemia.  She was at her hematologist office for iron infusion on 2024 when blood work revealed hemoglobin of 6.4 g/dl.      Plan:     Acute on chronic anemia:  History of JOCELIN on outpatient iron infusions followed by [facility].  Baseline hemoglobin ~8. Hgb on admission down to 5.6.  CT A/P without acute abnormality.  Transfused 2 units PRBC.  Monitor H&H and transfuse as needed.  EGD May 2023 was normal.   Colonoscopy 2022 demonstrated multiple small and large polyps; either tubulovillous or tubular adenomas. Poor prep.  GI consulted: Scheduled for push enteroscopy today.  If negative will need colonoscopy.  Continue baby aspirin.  Plavix discontinued.    CAD s/p PCI:   Cardiac surgery consulted: Okay to hold Plavix.  Continue aspirin and statin.     T2DM on long-term insulin: Follow-up A1c.  Continue insulin     HTN: BP low. Losartan and Lasix discontinued.  Resumed Coreg at a lower dose.    Chronic hypoxic respiratory failure due to COPD:  Continue inhalers.  Stable oxygen requirement: 2 L at baseline.    Diet Diet NPO Exceptions are: Sips of Water with Meds   DVT Prophylaxis [] Lovenox, []  Heparin, [x] SCDs, [x] Ambulation,  [] Eliquis, [] Xarelto  [] Coumadin   Code Status DNR-CCA   Disposition From: Home  Expected Disposition: Home  Estimated Date of Discharge: 24  Patient requires continued 
    V2.0    [facility] [place]      Name:  Brandi Gomez /Age/Sex: 1950  (73 y.o. female)   MRN & CSN:  [id number] & [id number] Encounter Date/Time: 2/10/2024 3:53 PM EST   Location:  [facility] PCP: Pricilla Duran MD     Attending:Aspen Villanueva MD       Hospital Day: 2    Assessment and Recommendations   rBandi Gomez is a 73 y.o. female with pmh of HFpEF, HLD, HTN, T2 DM, GERD, chronic anemia on outpatient iron infusions, bipolar disorder type I, obstructive sleep apnea/COPD with chronic hypoxia on 2 L oxygen, morbid obesity, CAD s/p PCI admitted for further evaluation of acute on chronic anemia.  She was at her hematologist office for iron infusion on 2024 when blood work revealed hemoglobin of 6.4 g/dl.      Plan:     Acute on chronic anemia:  History of JOCELIN on outpatient iron infusions followed by [facility].  Baseline hemoglobin ~8. Hgb on admission down to 5.6.  CT A/P without acute abnormality.  Transfused 2 units PRBC.  Monitor H&H and transfuse as needed.  EGD May 2023 was normal.   Colonoscopy 2022 demonstrated multiple small and large polyps; either tubulovillous or tubular adenomas. Poor prep.  GI consulted: Recommended push enteroscopy on Monday.  If negative will need colonoscopy.  Continue baby aspirin.  Plavix discontinued.    CAD s/p PCI:   Cardiac surgery consulted: Okay to hold Plavix.  Continue aspirin and statin.     T2DM on long-term insulin: Follow-up A1c.  Continue insulin     HTN: Resumed home medications.      Diet ADULT DIET; Regular; 4 carb choices (60 gm/meal); Low Fat/Low Chol/High Fiber/2 gm Na  Diet NPO Exceptions are: Sips of Water with Meds   DVT Prophylaxis [] Lovenox, []  Heparin, [x] SCDs, [x] Ambulation,  [] Eliquis, [] Xarelto  [] Coumadin   Code Status DNR-CCA   Disposition From: Home  Expected Disposition: Home  Estimated Date of Discharge: 24  Patient requires continued admission due to acute on chronic anemia   Surrogate Decision Maker/ POA 
BP this /59 while sitting in chair, this RN held off on Lasix/Losartan. Recheck 96/61. MD notified. Pt reports feeling tired but no symptoms related to BP. Waiting on new orders   
Data- discharge order received, pt verbalized agreement to discharge, disposition to previous residence, no needs for HHC/DME.     Action- discharge instructions prepared and given to pt, pt verbalized understanding. Medication information packet given r/t NEW and/or CHANGED prescriptions emphasizing name/purpose/side effects, pt verbalized understanding. Discharge instruction summary: Diet- carb controlled cardiac, Activity- as tolerated, Primary Care Physician as follows: Pricilla Duran -[phone] f/u appointment as scheduled, immunizations reviewed, prescription medications filled at preferred retail pharmacy.      1. WEIGHT: Admit Weight - Scale: 116.6 kg (257 lb) (02/09/24 1506)        Today  Weight - Scale: 111.1 kg (245 lb) (02/13/24 0757)       2. O2 SAT.: SpO2: 100 % (02/13/24 1115)    Response- Pt belongings gathered, IV removed. Disposition is home (no HHC/DME needs), transported with personal belongings, taken to lobby via w/c w/ RN, no complications.   
Patient is admitted to room 3371 from the emergency room. Patient has arrived to the floor at this time via stretcher and ambulated/transfered to bed. Oriented to room. Call light and bedside table within reach. Patient rates a medium fall risk. Denies further needs at this time. Blood transfusion in progress up transfer of patient. Will continue to monitor. Kevin Leiva    
Pharmacy Home Medication Reconciliation Note    A medication reconciliation has been completed for Brandi Gomez 1950    Pharmacy: [address] [place], [place]  Information provided by: patient w/ med list    The patient's home medication list is as follows:  No current facility-administered medications on file prior to encounter.     Current Outpatient Medications on File Prior to Encounter   Medication Sig Dispense Refill    Tirzepatide (MOUNJARO) 10 MG/0.5ML SOPN SC injection Inject 10 mg once a week (Patient taking differently: Inject 0.5 mLs into the skin once a week Inject 10 mg once a weekly on Mondays.) 2 mL 3    empagliflozin (JARDIANCE) 25 MG tablet Take 1 tablet by mouth daily 30 tablet 3    methocarbamol (ROBAXIN) 500 MG tablet Take 1 tablet by mouth 3 times daily      acetaminophen (TYLENOL 8 HOUR ARTHRITIS PAIN) 650 MG extended release tablet Take 1 tablet by mouth every 8 hours as needed for Pain      Continuous Blood Gluc  (FREESTYLE ARSENIO 2 READER) MELANI To check glucose levels 1 each 0    Continuous Blood Gluc Sensor (FREESTYLE ARSENIO 2 SENSOR) MISC Change every 14 days 2 each 5    Continuous Blood Gluc  (FREESTYLE ARSENIO 2 READER) MELANI To check glucose levels 1 each 0    Continuous Blood Gluc Sensor (FREESTYLE ARSENIO 2 SENSOR) MISC Change every 14 days 2 each 5    losartan (COZAAR) 50 MG tablet Take 1 tablet by mouth daily 90 tablet 1    furosemide (LASIX) 40 MG tablet Take 2 tablets by mouth daily Take 1.5 tablets every day. (Patient taking differently: Take 1.5 tablets by mouth daily Take 1.5 tablets every day. Patient may take an extra half tablet daily for additional swelling.) 60 tablet 0    Melatonin 10 MG TABS Take 10 mg by mouth nightly as needed      tiotropium (SPIRIVA RESPIMAT) 1.25 MCG/ACT AERS inhaler Inhale 2 puffs into the lungs daily      rosuvastatin (CRESTOR) 40 MG tablet Take 1 tablet by mouth nightly 90 tablet 3    carvedilol 
Pt arrived from endo to PACU bay 10. Reported received from endo rn/crna staff. Pt  arousable to voice. Pt on 6L simple mask, NSR, and VSS. Will continue to monitor.    
Pt stable and able to be transferred from PACU to room 3371. A&O , VSS, with no complaints at this time. 3t called and notified that pt is being transferred out of PACU and to room.    
Pt transferred to room 3371 at this time. A&O with no signs of distress. Report given to Sandrine KINNEY. V/u and denies questions or further needs at this time.     
PM         Electronically signed by Aspen Villanueva MD on 2/11/2024 at 1:37 PM

## 2024-02-15 ENCOUNTER — APPOINTMENT (OUTPATIENT)
Dept: GENERAL RADIOLOGY | Age: 74
End: 2024-02-15
Payer: MEDICARE

## 2024-02-15 ENCOUNTER — HOSPITAL ENCOUNTER (EMERGENCY)
Age: 74
Discharge: HOME | End: 2024-02-15
Attending: EMERGENCY MEDICINE
Payer: MEDICARE

## 2024-02-15 VITALS
HEART RATE: 97 BPM | BODY MASS INDEX: 46.29 KG/M2 | OXYGEN SATURATION: 100 % | TEMPERATURE: 98.3 F | WEIGHT: 245 LBS | SYSTOLIC BLOOD PRESSURE: 125 MMHG | RESPIRATION RATE: 19 BRPM | DIASTOLIC BLOOD PRESSURE: 66 MMHG

## 2024-02-15 DIAGNOSIS — D64.9 CHRONIC ANEMIA: ICD-10-CM

## 2024-02-15 DIAGNOSIS — R53.83 FATIGUE, UNSPECIFIED TYPE: Primary | ICD-10-CM

## 2024-02-15 LAB
ABO + RH BLD: NORMAL
ALBUMIN SERPL-MCNC: 4 G/DL (ref 3.4–5)
ALBUMIN/GLOB SERPL: 1.3 {RATIO} (ref 1.1–2.2)
ALP SERPL-CCNC: 71 U/L (ref 40–129)
ALT SERPL-CCNC: 12 U/L (ref 10–40)
ANION GAP SERPL CALCULATED.3IONS-SCNC: 12 MMOL/L (ref 3–16)
AST SERPL-CCNC: 20 U/L (ref 15–37)
BASOPHILS # BLD: 0 K/UL (ref 0–0.2)
BASOPHILS NFR BLD: 0.5 %
BILIRUB SERPL-MCNC: 0.4 MG/DL (ref 0–1)
BILIRUB UR QL STRIP.AUTO: NEGATIVE
BLD GP AB SCN SERPL QL: NORMAL
BUN SERPL-MCNC: 14 MG/DL (ref 7–20)
CALCIUM SERPL-MCNC: 9.1 MG/DL (ref 8.3–10.6)
CHLORIDE SERPL-SCNC: 99 MMOL/L (ref 99–110)
CLARITY UR: CLEAR
CO2 SERPL-SCNC: 27 MMOL/L (ref 21–32)
COLOR UR: YELLOW
CREAT SERPL-MCNC: 0.7 MG/DL (ref 0.6–1.2)
DEPRECATED RDW RBC AUTO: 22.2 % (ref 12.4–15.4)
EOSINOPHIL # BLD: 0.2 K/UL (ref 0–0.6)
EOSINOPHIL NFR BLD: 3.4 %
FLUAV RNA RESP QL NAA+PROBE: NOT DETECTED
FLUBV RNA RESP QL NAA+PROBE: NOT DETECTED
GFR SERPLBLD CREATININE-BSD FMLA CKD-EPI: >60 ML/MIN/{1.73_M2}
GLUCOSE SERPL-MCNC: 185 MG/DL (ref 70–99)
GLUCOSE UR STRIP.AUTO-MCNC: >=1000 MG/DL
HCT VFR BLD AUTO: 30.7 % (ref 36–48)
HGB BLD-MCNC: 9.3 G/DL (ref 12–16)
HGB UR QL STRIP.AUTO: NEGATIVE
KETONES UR STRIP.AUTO-MCNC: NEGATIVE MG/DL
LEUKOCYTE ESTERASE UR QL STRIP.AUTO: NEGATIVE
LYMPHOCYTES # BLD: 0.8 K/UL (ref 1–5.1)
LYMPHOCYTES NFR BLD: 16.6 %
MCH RBC QN AUTO: 24.3 PG (ref 26–34)
MCHC RBC AUTO-ENTMCNC: 30.1 G/DL (ref 31–36)
MCV RBC AUTO: 80.7 FL (ref 80–100)
MONOCYTES # BLD: 0.4 K/UL (ref 0–1.3)
MONOCYTES NFR BLD: 7.4 %
NEUTROPHILS # BLD: 3.7 K/UL (ref 1.7–7.7)
NEUTROPHILS NFR BLD: 72.1 %
NITRITE UR QL STRIP.AUTO: NEGATIVE
NT-PROBNP SERPL-MCNC: 76 PG/ML (ref 0–124)
PH UR STRIP.AUTO: 5.5 [PH] (ref 5–8)
PLATELET # BLD AUTO: 124 K/UL (ref 135–450)
PMV BLD AUTO: 9.9 FL (ref 5–10.5)
POTASSIUM SERPL-SCNC: 4 MMOL/L (ref 3.5–5.1)
PROT SERPL-MCNC: 7 G/DL (ref 6.4–8.2)
PROT UR STRIP.AUTO-MCNC: NEGATIVE MG/DL
RBC # BLD AUTO: 3.81 M/UL (ref 4–5.2)
SARS-COV-2 RNA RESP QL NAA+PROBE: NOT DETECTED
SODIUM SERPL-SCNC: 138 MMOL/L (ref 136–145)
SP GR UR STRIP.AUTO: 1.02 (ref 1–1.03)
TROPONIN, HIGH SENSITIVITY: 15 NG/L (ref 0–14)
UA COMPLETE W REFLEX CULTURE PNL UR: ABNORMAL
UA DIPSTICK W REFLEX MICRO PNL UR: ABNORMAL
URN SPEC COLLECT METH UR: ABNORMAL
UROBILINOGEN UR STRIP-ACNC: 0.2 E.U./DL
WBC # BLD AUTO: 5.1 K/UL (ref 4–11)

## 2024-02-15 PROCEDURE — 86850 RBC ANTIBODY SCREEN: CPT

## 2024-02-15 PROCEDURE — 85025 COMPLETE CBC W/AUTO DIFF WBC: CPT

## 2024-02-15 PROCEDURE — 99285 EMERGENCY DEPT VISIT HI MDM: CPT

## 2024-02-15 PROCEDURE — 86901 BLOOD TYPING SEROLOGIC RH(D): CPT

## 2024-02-15 PROCEDURE — 81003 URINALYSIS AUTO W/O SCOPE: CPT

## 2024-02-15 PROCEDURE — 71045 X-RAY EXAM CHEST 1 VIEW: CPT

## 2024-02-15 PROCEDURE — 80053 COMPREHEN METABOLIC PANEL: CPT

## 2024-02-15 PROCEDURE — 87636 SARSCOV2 & INF A&B AMP PRB: CPT

## 2024-02-15 PROCEDURE — 83880 ASSAY OF NATRIURETIC PEPTIDE: CPT

## 2024-02-15 PROCEDURE — 93005 ELECTROCARDIOGRAM TRACING: CPT | Performed by: NURSE PRACTITIONER

## 2024-02-15 PROCEDURE — 84484 ASSAY OF TROPONIN QUANT: CPT

## 2024-02-15 PROCEDURE — 86900 BLOOD TYPING SEROLOGIC ABO: CPT

## 2024-02-16 LAB
EKG ATRIAL RATE: 103 BPM
EKG DIAGNOSIS: NORMAL
EKG P AXIS: 37 DEGREES
EKG P-R INTERVAL: 130 MS
EKG Q-T INTERVAL: 338 MS
EKG QRS DURATION: 76 MS
EKG QTC CALCULATION (BAZETT): 442 MS
EKG R AXIS: -10 DEGREES
EKG T AXIS: 113 DEGREES
EKG VENTRICULAR RATE: 103 BPM

## 2024-02-16 PROCEDURE — 93010 ELECTROCARDIOGRAM REPORT: CPT | Performed by: INTERNAL MEDICINE

## 2024-02-16 NOTE — ED PROVIDER NOTES
[facility] EMERGENCY DEPARTMENT  EMERGENCY DEPARTMENT ENCOUNTER        Pt Name: Brandi Gomez  MRN: [id number]  Birthdate 1950  Date of evaluation: 2/15/2024  Provider: SONDRA Pineda CNP  PCP: Pricilla Duran MD  Note Started: 7:48 PM EST 2/15/24       I have seen and evaluated this patient with my supervising physician Eliot      CHIEF COMPLAINT       Chief Complaint   Patient presents with    Anemia     Pt via sister from home, just dc Tuesday after having anemia, states very fatigued and can't move well now       HISTORY OF PRESENT ILLNESS: 1 or more Elements     History from : Patient    Limitations to history : None    Brandi Gomez is a 73 y.o. female who presents to the emerged department with complaint of fatigue.  The patient reports that she was discharged Tuesday after blood transfusion and cauterization of AVM causing acute on chronic anemia.  The patient denies any black tarry or bloody stools but admits that she does not look at her stool.  She does have chronic anemia for which she receives iron infusions with hematology.  Denies blood loss.  Reports that she did initially feel better after being discharged but today is very fatigued.  She denies specific symptoms.  She is on chronic oxygen with history of ALEXANDRU/COPD.    Denies any headache, fever, lightheadedness, dizziness, visual disturbances.  No chest pain or pressure.  No neck or back pain.  No cough, or congestion.  No abdominal pain, nausea, vomiting, diarrhea, constipation, or dysuria.  No rash.    Nursing Notes were all reviewed and agreed with or any disagreements were addressed in the HPI.    REVIEW OF SYSTEMS :      Review of Systems   Constitutional:  Positive for fatigue. Negative for activity change, chills and fever.   Respiratory:  Negative for chest tightness and shortness of breath.    Cardiovascular:  Negative for chest pain.   Gastrointestinal:  Negative for abdominal pain, diarrhea,

## 2024-02-16 NOTE — ED PROVIDER NOTES
Emergency Department Provider Note     Location: [facility] EMERGENCY DEPARTMENT  2/15/2024    I independently performed a history and physical on Brandi Gomez.   All diagnostic, treatment, and disposition decisions were made by myself in conjunction with the mid-level provider.     Brandi Gomez is a 73 y.o. female here for fatigue.  Patient says she has chronic anemia and recently admitted for blood transfusion.  She has had fatigue for a long time but today she was falling asleep more than usual.  She called her PCP who advised her to come get evaluated in the ED.  Patient denies pain.  She does not pay attention to her stool and therefore she is not sure if she has any more episodes of GI bleed.  Patient reports a history of AV malformation found on endoscopy.  Patient is concerned that her hemoglobin is lower than usual.    ED Triage Vitals [02/15/24 1836]   BP Temp Temp Source Pulse Respirations SpO2 Height Weight - Scale   125/66 98.3 °F (36.8 °C) Oral 97 19 100 % -- 111.1 kg (245 lb)        Exam showed WDWN female awake, alert, no facial droop, no slurred speech, heart RRR, lungs clear, abdomen soft, NDNT.      ED course/MDM  Patient seen and examined in bay 3    EKG  The Ekg interpreted by me in the absence of a cardiologist shows.  sinus tachycardia, galu=161     Axis is   Left axis deviation  QTc is  normal  PVC noted   No evidence of acute ischemia.   Compared to prior EKG dated February 9, 2024, patient is slightly tachycardic but otherwise no significant changes      Radiology  XR CHEST PORTABLE    Result Date: 2/15/2024  EXAMINATION: ONE XRAY VIEW OF THE CHEST 2/15/2024 7:22 pm COMPARISON: Chest x-ray 02/09/2024 HISTORY: ORDERING SYSTEM PROVIDED HISTORY: SOB TECHNOLOGIST PROVIDED HISTORY: Reason for exam:->SOB Reason for Exam: SOB FINDINGS: There is no acute airspace opacity, pleural effusion, or pneumothorax.  There is stable enlargement of the heart.  Poor penetration due to

## 2024-02-16 NOTE — ED PROVIDER NOTES
EKG My Reading:  Rate: 103  Rhythm: sinus tachycardia  ST Segments: abnormal T wave morphology in lateral leads  STEMI: no  QTc: 442 (normal)  QRS: 76 (normal)       Yaw Morales MD  02/15/24 0590

## 2024-02-19 ENCOUNTER — TELEPHONE (OUTPATIENT)
Dept: CARDIOLOGY CLINIC | Age: 74
End: 2024-02-19

## 2024-02-19 NOTE — TELEPHONE ENCOUNTER
Patient is  s/p LM stent 6/2023 admitted with gi bleed  Last plavix was 2/9,    on 2/12 had 1 gastric AVM and one jejunal AVM both treated with APC. Gastric AVM bled and was then clipped  Today patient states she has no bleeding, advised her to call GI to see when is safe to go back on plavix

## 2024-02-19 NOTE — TELEPHONE ENCOUNTER
Pt states the hospital took her off Plavix because of her bp.  Pt is concerned about blood clots and would like to when or if she should start it up again.    Please advise

## 2024-02-21 ENCOUNTER — TELEPHONE (OUTPATIENT)
Dept: CARDIOLOGY CLINIC | Age: 74
End: 2024-02-21

## 2024-02-21 NOTE — TELEPHONE ENCOUNTER
Called patient to follow up. No answer, called GI and asked when patient can restart plavix, they will call back

## 2024-02-21 NOTE — TELEPHONE ENCOUNTER
Called patient and advised her to call back. Dr Weaver would like her to start plavix asap if ok with GI

## 2024-03-07 ENCOUNTER — OFFICE VISIT (OUTPATIENT)
Dept: CARDIOLOGY CLINIC | Age: 74
End: 2024-03-07
Payer: MEDICARE

## 2024-03-07 ENCOUNTER — HOSPITAL ENCOUNTER (OUTPATIENT)
Age: 74
Discharge: HOME | End: 2024-03-07
Payer: MEDICARE

## 2024-03-07 VITALS
WEIGHT: 258 LBS | HEIGHT: 61 IN | OXYGEN SATURATION: 94 % | HEART RATE: 99 BPM | SYSTOLIC BLOOD PRESSURE: 110 MMHG | BODY MASS INDEX: 48.71 KG/M2 | DIASTOLIC BLOOD PRESSURE: 60 MMHG

## 2024-03-07 DIAGNOSIS — D50.9 IRON DEFICIENCY ANEMIA, UNSPECIFIED IRON DEFICIENCY ANEMIA TYPE: ICD-10-CM

## 2024-03-07 DIAGNOSIS — G47.33 OSA (OBSTRUCTIVE SLEEP APNEA): ICD-10-CM

## 2024-03-07 DIAGNOSIS — I50.32 CHRONIC DIASTOLIC HEART FAILURE (HCC): Primary | ICD-10-CM

## 2024-03-07 DIAGNOSIS — I25.10 CORONARY ARTERY DISEASE DUE TO CALCIFIED CORONARY LESION: ICD-10-CM

## 2024-03-07 DIAGNOSIS — E66.01 MORBID OBESITY WITH BMI OF 50.0-59.9, ADULT (HCC): ICD-10-CM

## 2024-03-07 DIAGNOSIS — I25.84 CORONARY ARTERY DISEASE DUE TO CALCIFIED CORONARY LESION: ICD-10-CM

## 2024-03-07 DIAGNOSIS — I50.32 CHRONIC DIASTOLIC HEART FAILURE (HCC): ICD-10-CM

## 2024-03-07 DIAGNOSIS — I10 PRIMARY HYPERTENSION: ICD-10-CM

## 2024-03-07 LAB
ANION GAP SERPL CALCULATED.3IONS-SCNC: 12 MMOL/L (ref 3–16)
BUN SERPL-MCNC: 14 MG/DL (ref 7–20)
CALCIUM SERPL-MCNC: 9.3 MG/DL (ref 8.3–10.6)
CHLORIDE SERPL-SCNC: 95 MMOL/L (ref 99–110)
CO2 SERPL-SCNC: 32 MMOL/L (ref 21–32)
CREAT SERPL-MCNC: 0.7 MG/DL (ref 0.6–1.2)
DEPRECATED RDW RBC AUTO: 24.3 % (ref 12.4–15.4)
GFR SERPLBLD CREATININE-BSD FMLA CKD-EPI: >60 ML/MIN/{1.73_M2}
GLUCOSE SERPL-MCNC: 227 MG/DL (ref 70–99)
HCT VFR BLD AUTO: 30.5 % (ref 36–48)
HGB BLD-MCNC: 9.7 G/DL (ref 12–16)
MCH RBC QN AUTO: 25.9 PG (ref 26–34)
MCHC RBC AUTO-ENTMCNC: 31.7 G/DL (ref 31–36)
MCV RBC AUTO: 81.6 FL (ref 80–100)
NT-PROBNP SERPL-MCNC: <36 PG/ML (ref 0–124)
PLATELET # BLD AUTO: 124 K/UL (ref 135–450)
PMV BLD AUTO: 10.1 FL (ref 5–10.5)
POTASSIUM SERPL-SCNC: 4.2 MMOL/L (ref 3.5–5.1)
RBC # BLD AUTO: 3.74 M/UL (ref 4–5.2)
SODIUM SERPL-SCNC: 139 MMOL/L (ref 136–145)
TSH SERPL DL<=0.005 MIU/L-ACNC: 1.62 UIU/ML (ref 0.27–4.2)
WBC # BLD AUTO: 5.1 K/UL (ref 4–11)

## 2024-03-07 PROCEDURE — 36415 COLL VENOUS BLD VENIPUNCTURE: CPT

## 2024-03-07 PROCEDURE — G8400 PT W/DXA NO RESULTS DOC: HCPCS | Performed by: CLINICAL NURSE SPECIALIST

## 2024-03-07 PROCEDURE — 83880 ASSAY OF NATRIURETIC PEPTIDE: CPT

## 2024-03-07 PROCEDURE — 80048 BASIC METABOLIC PNL TOTAL CA: CPT

## 2024-03-07 PROCEDURE — G8417 CALC BMI ABV UP PARAM F/U: HCPCS | Performed by: CLINICAL NURSE SPECIALIST

## 2024-03-07 PROCEDURE — 1123F ACP DISCUSS/DSCN MKR DOCD: CPT | Performed by: CLINICAL NURSE SPECIALIST

## 2024-03-07 PROCEDURE — 85027 COMPLETE CBC AUTOMATED: CPT

## 2024-03-07 PROCEDURE — 1111F DSCHRG MED/CURRENT MED MERGE: CPT | Performed by: CLINICAL NURSE SPECIALIST

## 2024-03-07 PROCEDURE — 1036F TOBACCO NON-USER: CPT | Performed by: CLINICAL NURSE SPECIALIST

## 2024-03-07 PROCEDURE — 3078F DIAST BP <80 MM HG: CPT | Performed by: CLINICAL NURSE SPECIALIST

## 2024-03-07 PROCEDURE — G8484 FLU IMMUNIZE NO ADMIN: HCPCS | Performed by: CLINICAL NURSE SPECIALIST

## 2024-03-07 PROCEDURE — 1090F PRES/ABSN URINE INCON ASSESS: CPT | Performed by: CLINICAL NURSE SPECIALIST

## 2024-03-07 PROCEDURE — 3074F SYST BP LT 130 MM HG: CPT | Performed by: CLINICAL NURSE SPECIALIST

## 2024-03-07 PROCEDURE — 84443 ASSAY THYROID STIM HORMONE: CPT

## 2024-03-07 PROCEDURE — 99215 OFFICE O/P EST HI 40 MIN: CPT | Performed by: CLINICAL NURSE SPECIALIST

## 2024-03-07 PROCEDURE — 3017F COLORECTAL CA SCREEN DOC REV: CPT | Performed by: CLINICAL NURSE SPECIALIST

## 2024-03-07 PROCEDURE — G8427 DOCREV CUR MEDS BY ELIG CLIN: HCPCS | Performed by: CLINICAL NURSE SPECIALIST

## 2024-03-07 RX ORDER — FUROSEMIDE 20 MG/1
30 TABLET ORAL DAILY
Qty: 90 TABLET | Refills: 0
Start: 2024-03-07

## 2024-03-07 RX ORDER — CLOPIDOGREL BISULFATE 75 MG/1
75 TABLET ORAL DAILY
COMMUNITY

## 2024-03-07 NOTE — PROGRESS NOTES
3 times daily   Yes Ward Stevenson MD   acetaminophen (TYLENOL 8 HOUR ARTHRITIS PAIN) 650 MG extended release tablet Take 1 tablet by mouth every 8 hours as needed for Pain   Yes Ward Stevenson MD   Melatonin 10 MG TABS Take 10 mg by mouth nightly as needed   Yes Ward Stevenson MD   tiotropium (SPIRIVA RESPIMAT) 1.25 MCG/ACT AERS inhaler Inhale 2 puffs into the lungs daily   Yes Ward Stevenson MD   rosuvastatin (CRESTOR) 40 MG tablet Take 1 tablet by mouth nightly 6/15/23  Yes Rg Soto MD   docusate sodium (COLACE) 100 MG capsule Take 1 capsule by mouth 2 times daily   Yes Ward Stevenson MD   OXYGEN Inhale 2 L into the lungs continuous   Yes Ward Stevenson MD   aspirin 81 MG chewable tablet Take 1 tablet by mouth daily 5/3/23  Yes Margarito Mora MD   vitamin B-12 (CYANOCOBALAMIN) 100 MCG tablet Take 10 tablets by mouth Once a week on Wednesdays   Yes Ward Stevenson MD   LEVEMIR FLEXTOUCH 100 UNIT/ML injection pen Inject 70 Units into the skin 2 times daily 2/9/23  Yes ProviderWard MD   niacin 500 MG CR capsule Take 1 capsule by mouth nightly   Yes ProviderWard MD   ARIPiprazole (ABILIFY) 5 MG tablet Take 1 tablet by mouth daily Pt takes 5 mg   Yes Ward Stevenson MD   montelukast (SINGULAIR) 10 MG tablet Take 1 tablet by mouth nightly   Yes Ward Stevenson MD   QUEtiapine (SEROQUEL) 100 MG tablet Take 1 tablet by mouth daily   Yes ProviderWard MD   albuterol (PROVENTIL HFA;VENTOLIN HFA) 108 (90 BASE) MCG/ACT inhaler Inhale 2 puffs into the lungs every 6 hours as needed   Yes ProviderWard MD   QUEtiapine (SEROQUEL) 200 MG tablet Take 1 tablet by mouth at bedtime   Yes ProviderWard MD        Allergies:  Penicillins     Review of Systems:   Constitutional: there has been no unanticipated weight loss. There's been no change in energy level, sleep pattern, or activity level.     Eyes: No visual changes or

## 2024-03-07 NOTE — PATIENT INSTRUCTIONS
Blood work today  Hold on losartan for now but would like to get back on for kidney protection due to diabetes  Continue all current medications

## 2024-03-08 ENCOUNTER — TELEPHONE (OUTPATIENT)
Dept: CARDIOLOGY CLINIC | Age: 74
End: 2024-03-08

## 2024-03-08 NOTE — TELEPHONE ENCOUNTER
----- Message from SONDRA Mckenna - CNS sent at 3/8/2024  8:37 AM EST -----  Blood work is stable and improving  Continue all current medications   Spoke to patient she verbalized understanding.

## 2024-03-13 ENCOUNTER — TELEPHONE (OUTPATIENT)
Dept: CARDIOLOGY CLINIC | Age: 74
End: 2024-03-13

## 2024-03-13 RX ORDER — CARVEDILOL 3.12 MG/1
3.12 TABLET ORAL 2 TIMES DAILY
Qty: 30 TABLET | Refills: 0 | Status: SHIPPED | OUTPATIENT
Start: 2024-03-13

## 2024-03-13 NOTE — TELEPHONE ENCOUNTER
Spoke to patient she needs a short term supply sent to [facility] pended below and a long term sent to [facility].

## 2024-03-13 NOTE — TELEPHONE ENCOUNTER
Pt called [facility] to know if [facility] want pt t stay on the carvedilol 3.125 or go back on the carvedilol 6.25 MG     Pls advise pt   [phone]

## 2024-03-21 ENCOUNTER — TELEPHONE (OUTPATIENT)
Dept: ENDOCRINOLOGY | Age: 74
End: 2024-03-21

## 2024-03-21 DIAGNOSIS — E11.8 POORLY CONTROLLED TYPE 2 DIABETES MELLITUS WITH COMPLICATION (HCC): ICD-10-CM

## 2024-03-21 DIAGNOSIS — E11.65 POORLY CONTROLLED TYPE 2 DIABETES MELLITUS WITH COMPLICATION (HCC): ICD-10-CM

## 2024-03-21 RX ORDER — TIRZEPATIDE 10 MG/.5ML
INJECTION, SOLUTION SUBCUTANEOUS
Qty: 2 ML | Refills: 3 | Status: SHIPPED | OUTPATIENT
Start: 2024-03-21

## 2024-03-21 NOTE — TELEPHONE ENCOUNTER
Fax from [facility]/ new rx request for Mounjaro 10mg    LOV    11-28-23  FOV     4-11-24

## 2024-03-25 ENCOUNTER — TELEPHONE (OUTPATIENT)
Dept: CARDIOLOGY CLINIC | Age: 74
End: 2024-03-25

## 2024-03-25 RX ORDER — CARVEDILOL 3.12 MG/1
3.12 TABLET ORAL 2 TIMES DAILY
Qty: 180 TABLET | Refills: 0 | Status: SHIPPED | OUTPATIENT
Start: 2024-03-25

## 2024-03-25 NOTE — TELEPHONE ENCOUNTER
Pt called requesting a refill on the following medications:    carvedilol (COREG) 3.125 MG tablet   90 day supply       [facility] [id number] - [place], [place] - [address] [phone] - F [phone]  [address] [facility] [address]  Phone: [phone]  Fax: [phone]

## 2024-04-08 ENCOUNTER — TELEPHONE (OUTPATIENT)
Dept: CARDIOLOGY CLINIC | Age: 74
End: 2024-04-08

## 2024-04-08 NOTE — TELEPHONE ENCOUNTER
[facility] Dr[facility] Lopez for Neurology. Fax number is [phone] Office number [phone]. Pt can be reached at [phone].Please advise. Thank you.

## 2024-04-15 ENCOUNTER — TELEPHONE (OUTPATIENT)
Age: 74
End: 2024-04-15

## 2024-04-15 NOTE — TELEPHONE ENCOUNTER
Npt ref by Roger for spinal stenosis. Spoke with pt to schedule npt appt. Pt declined because of distance. Suggested pt call pcp and obtain referral for closer neurologist.     Contacted pcp office to make aware of issue.

## 2024-04-24 ENCOUNTER — OFFICE VISIT (OUTPATIENT)
Dept: ENDOCRINOLOGY | Age: 74
End: 2024-04-24
Payer: MEDICARE

## 2024-04-24 VITALS
WEIGHT: 250 LBS | BODY MASS INDEX: 47.2 KG/M2 | HEART RATE: 104 BPM | DIASTOLIC BLOOD PRESSURE: 65 MMHG | HEIGHT: 61 IN | RESPIRATION RATE: 16 BRPM | SYSTOLIC BLOOD PRESSURE: 122 MMHG

## 2024-04-24 DIAGNOSIS — E78.2 MIXED HYPERLIPIDEMIA: ICD-10-CM

## 2024-04-24 DIAGNOSIS — J44.1 COPD EXACERBATION (HCC): ICD-10-CM

## 2024-04-24 DIAGNOSIS — E11.65 POORLY CONTROLLED TYPE 2 DIABETES MELLITUS WITH COMPLICATION (HCC): Primary | ICD-10-CM

## 2024-04-24 DIAGNOSIS — I10 PRIMARY HYPERTENSION: ICD-10-CM

## 2024-04-24 DIAGNOSIS — E11.8 POORLY CONTROLLED TYPE 2 DIABETES MELLITUS WITH COMPLICATION (HCC): Primary | ICD-10-CM

## 2024-04-24 LAB — HBA1C MFR BLD: 7.6 %

## 2024-04-24 PROCEDURE — 2022F DILAT RTA XM EVC RTNOPTHY: CPT | Performed by: INTERNAL MEDICINE

## 2024-04-24 PROCEDURE — 1123F ACP DISCUSS/DSCN MKR DOCD: CPT | Performed by: INTERNAL MEDICINE

## 2024-04-24 PROCEDURE — 1036F TOBACCO NON-USER: CPT | Performed by: INTERNAL MEDICINE

## 2024-04-24 PROCEDURE — 3023F SPIROM DOC REV: CPT | Performed by: INTERNAL MEDICINE

## 2024-04-24 PROCEDURE — G8427 DOCREV CUR MEDS BY ELIG CLIN: HCPCS | Performed by: INTERNAL MEDICINE

## 2024-04-24 PROCEDURE — 99214 OFFICE O/P EST MOD 30 MIN: CPT | Performed by: INTERNAL MEDICINE

## 2024-04-24 PROCEDURE — G8417 CALC BMI ABV UP PARAM F/U: HCPCS | Performed by: INTERNAL MEDICINE

## 2024-04-24 PROCEDURE — 1090F PRES/ABSN URINE INCON ASSESS: CPT | Performed by: INTERNAL MEDICINE

## 2024-04-24 PROCEDURE — 83036 HEMOGLOBIN GLYCOSYLATED A1C: CPT | Performed by: INTERNAL MEDICINE

## 2024-04-24 PROCEDURE — 3017F COLORECTAL CA SCREEN DOC REV: CPT | Performed by: INTERNAL MEDICINE

## 2024-04-24 PROCEDURE — 3074F SYST BP LT 130 MM HG: CPT | Performed by: INTERNAL MEDICINE

## 2024-04-24 PROCEDURE — 3078F DIAST BP <80 MM HG: CPT | Performed by: INTERNAL MEDICINE

## 2024-04-24 PROCEDURE — G8400 PT W/DXA NO RESULTS DOC: HCPCS | Performed by: INTERNAL MEDICINE

## 2024-04-24 PROCEDURE — 3051F HG A1C>EQUAL 7.0%<8.0%: CPT | Performed by: INTERNAL MEDICINE

## 2024-04-24 RX ORDER — INSULIN DEGLUDEC 200 U/ML
INJECTION, SOLUTION SUBCUTANEOUS
Qty: 15 ADJUSTABLE DOSE PRE-FILLED PEN SYRINGE | Refills: 4 | Status: SHIPPED | OUTPATIENT
Start: 2024-04-24

## 2024-04-24 RX ORDER — TIRZEPATIDE 10 MG/.5ML
INJECTION, SOLUTION SUBCUTANEOUS
Qty: 6 ML | Refills: 1 | Status: SHIPPED | OUTPATIENT
Start: 2024-04-24

## 2024-04-24 ASSESSMENT — ENCOUNTER SYMPTOMS: BLURRED VISION: 1

## 2024-04-24 NOTE — PATIENT INSTRUCTIONS
Make sure friends and family know how to use it.  When should you call for help?   Call 911 anytime you think you may need emergency care. For example, call if:    You passed out (lost consciousness).     You are confused or cannot think clearly.     Your blood sugar is very high or very low.   Watch closely for changes in your health, and be sure to contact your doctor if:    Your blood sugar stays outside the level your doctor set for you.     You have any problems.   Where can you learn more?  Go to https://www.Innovate/Protect.net/patientEd and enter R955 to learn more about \"Hypoglycemia: Care Instructions.\"  Current as of: October 2, 2023               Content Version: 14.0  © 2006-2024 [employer].   Care instructions adapted under license by [employer]. If you have questions about a medical condition or this instruction, always ask your healthcare professional. [employer] disclaims any warranty or liability for your use of this information.

## 2024-04-24 NOTE — PROGRESS NOTES
Brandi Gomez is a 73 y.o. female is referred to me by Sylvia AMARO  for evaluation and management of uncontrolled  Type 2 diabetes. Brandi Gomez was diagnosed with Diabetes mellitus in 2000s .  Diabetes was diagnosed at routine screening .  Brandi Gomez got diabetic education in the past.  Comorbid conditions: Neuropathy, Nephropathy, Retinopathy, Chronic Kidney Disease, and Coronary Artery Disease  Patient was initially started on metformin   Patient has tried the following medications for diabetes in the past metformin stopped due to issues with kidneys according to the patient    Patient also has comorbidities that include CHF , CAD s/p stent in 2023  and follows with Cardiology she also has COPD     INTERIM:    Diabetes  She presents for her follow-up diabetic visit. She has type 2 diabetes mellitus. No MedicAlert identification noted. The initial diagnosis of diabetes was made 20 years ago. Her disease course has been stable. Hypoglycemia symptoms include dizziness, sweats and tremors. Associated symptoms include blurred vision and foot paresthesias. There are no hypoglycemic complications. Symptoms are stable. Diabetic complications include heart disease, peripheral neuropathy and retinopathy. Risk factors for coronary artery disease include diabetes mellitus, dyslipidemia, obesity and post-menopausal. Current diabetic treatment includes insulin injections. Her weight is stable. She is following a generally unhealthy diet. Meal planning includes avoidance of concentrated sweets. She has not had a previous visit with a dietitian. She rarely participates in exercise. There is no change in her home blood glucose trend. Her breakfast blood glucose is taken between 7-8 am. Her breakfast blood glucose range is generally >200 mg/dl. Her lunch blood glucose is taken between 12-1 pm. Her lunch blood glucose range is generally >200 mg/dl. An ACE inhibitor/angiotensin II receptor blocker is being taken.

## 2024-04-29 ENCOUNTER — TELEPHONE (OUTPATIENT)
Dept: ENDOCRINOLOGY | Age: 74
End: 2024-04-29

## 2024-04-29 NOTE — TELEPHONE ENCOUNTER
Fax from [facility]    Clarification needed    We rec'd 2 rx's written on the same date for Tresiba u200 flextouch inj pen 3x3ml = 9ml and Levemir u100 flepen inj [id number] (from Dr Duran) please clarify current therapy

## 2024-06-04 ENCOUNTER — TELEPHONE (OUTPATIENT)
Dept: ENDOCRINOLOGY | Age: 74
End: 2024-06-04

## 2024-06-04 DIAGNOSIS — E11.8 POORLY CONTROLLED TYPE 2 DIABETES MELLITUS WITH COMPLICATION (HCC): ICD-10-CM

## 2024-06-04 DIAGNOSIS — E11.65 POORLY CONTROLLED TYPE 2 DIABETES MELLITUS WITH COMPLICATION (HCC): ICD-10-CM

## 2024-06-04 RX ORDER — TIRZEPATIDE 10 MG/.5ML
INJECTION, SOLUTION SUBCUTANEOUS
Qty: 6 ML | Refills: 1 | Status: SHIPPED | OUTPATIENT
Start: 2024-06-04

## 2024-06-04 NOTE — TELEPHONE ENCOUNTER
Pt asking for rx to be sent to a different pharmacy       Tirzepatide (MOUNJARO) 10 MG/0.5ML SOPN SC injection     [id number] - [place], [place] - [address] -  [phone] - F [phone] [address] [id number]  Phone: [phone]  Fax: [phone]

## 2024-06-24 ENCOUNTER — TELEPHONE (OUTPATIENT)
Dept: ENDOCRINOLOGY | Age: 74
End: 2024-06-24

## 2024-06-24 DIAGNOSIS — E11.65 POORLY CONTROLLED TYPE 2 DIABETES MELLITUS WITH COMPLICATION (HCC): Primary | ICD-10-CM

## 2024-06-24 DIAGNOSIS — E11.8 POORLY CONTROLLED TYPE 2 DIABETES MELLITUS WITH COMPLICATION (HCC): Primary | ICD-10-CM

## 2024-06-24 RX ORDER — TIRZEPATIDE 12.5 MG/.5ML
INJECTION, SOLUTION SUBCUTANEOUS
Qty: 6 ML | Refills: 1 | Status: SHIPPED | OUTPATIENT
Start: 2024-06-24

## 2024-06-24 NOTE — TELEPHONE ENCOUNTER
Patient is taking Insulin 80 long acting She started taking Tresiba today. She was taking  levimir today till she switched today to see if that helps improve sugars.     She states that she does not take a short acting insulin.       Mounjaro 10 mg weekly Taking regularly     Also takes Jardiance daily.

## 2024-06-24 NOTE — TELEPHONE ENCOUNTER
Patient can increase her Tresiba to 88 units daily  If she is tolerating Mounjaro and without any side effects that can be increased from 10 mg weekly to 12.5 mg weekly

## 2024-06-24 NOTE — TELEPHONE ENCOUNTER
Pt calling and states \"her sugar has been in the 300-400 range since her last appt\". Asked pt so since April and she said yes. Pt states its like 230 or so when she wakes up in the morning. Pt states she's tired of having a dry mouth    # [phone]

## 2024-06-25 NOTE — TELEPHONE ENCOUNTER
Pt calling back and states her Jardiance still needs to be sent to pharmacy. She only has 1 day left of pills    [facility] [address]#[phone]

## 2024-06-27 RX ORDER — CARVEDILOL 3.12 MG/1
3.12 TABLET ORAL 2 TIMES DAILY
Qty: 180 TABLET | Refills: 0 | Status: SHIPPED | OUTPATIENT
Start: 2024-06-27

## 2024-07-16 ENCOUNTER — OFFICE VISIT (OUTPATIENT)
Dept: CARDIOLOGY CLINIC | Age: 74
End: 2024-07-16
Payer: MEDICARE

## 2024-07-16 VITALS
WEIGHT: 249 LBS | OXYGEN SATURATION: 95 % | SYSTOLIC BLOOD PRESSURE: 132 MMHG | HEIGHT: 61 IN | BODY MASS INDEX: 47.01 KG/M2 | HEART RATE: 105 BPM | DIASTOLIC BLOOD PRESSURE: 84 MMHG

## 2024-07-16 DIAGNOSIS — I25.84 CORONARY ARTERY DISEASE DUE TO CALCIFIED CORONARY LESION: ICD-10-CM

## 2024-07-16 DIAGNOSIS — I25.10 CORONARY ARTERY DISEASE DUE TO CALCIFIED CORONARY LESION: ICD-10-CM

## 2024-07-16 DIAGNOSIS — I10 PRIMARY HYPERTENSION: ICD-10-CM

## 2024-07-16 DIAGNOSIS — G47.33 OSA (OBSTRUCTIVE SLEEP APNEA): ICD-10-CM

## 2024-07-16 DIAGNOSIS — E66.01 MORBID OBESITY WITH BMI OF 50.0-59.9, ADULT (HCC): ICD-10-CM

## 2024-07-16 DIAGNOSIS — I50.32 CHRONIC DIASTOLIC HEART FAILURE (HCC): Primary | ICD-10-CM

## 2024-07-16 PROCEDURE — 3075F SYST BP GE 130 - 139MM HG: CPT | Performed by: CLINICAL NURSE SPECIALIST

## 2024-07-16 PROCEDURE — 1036F TOBACCO NON-USER: CPT | Performed by: CLINICAL NURSE SPECIALIST

## 2024-07-16 PROCEDURE — 1123F ACP DISCUSS/DSCN MKR DOCD: CPT | Performed by: CLINICAL NURSE SPECIALIST

## 2024-07-16 PROCEDURE — 1090F PRES/ABSN URINE INCON ASSESS: CPT | Performed by: CLINICAL NURSE SPECIALIST

## 2024-07-16 PROCEDURE — G8400 PT W/DXA NO RESULTS DOC: HCPCS | Performed by: CLINICAL NURSE SPECIALIST

## 2024-07-16 PROCEDURE — G8427 DOCREV CUR MEDS BY ELIG CLIN: HCPCS | Performed by: CLINICAL NURSE SPECIALIST

## 2024-07-16 PROCEDURE — 3079F DIAST BP 80-89 MM HG: CPT | Performed by: CLINICAL NURSE SPECIALIST

## 2024-07-16 PROCEDURE — 3017F COLORECTAL CA SCREEN DOC REV: CPT | Performed by: CLINICAL NURSE SPECIALIST

## 2024-07-16 PROCEDURE — 99214 OFFICE O/P EST MOD 30 MIN: CPT | Performed by: CLINICAL NURSE SPECIALIST

## 2024-07-16 PROCEDURE — G8417 CALC BMI ABV UP PARAM F/U: HCPCS | Performed by: CLINICAL NURSE SPECIALIST

## 2024-07-16 RX ORDER — POTASSIUM CHLORIDE 1.5 G/1.58G
20 POWDER, FOR SOLUTION ORAL DAILY
COMMUNITY

## 2024-07-16 RX ORDER — CARVEDILOL 6.25 MG/1
6.25 TABLET ORAL 2 TIMES DAILY
Qty: 60 TABLET | Refills: 0
Start: 2024-07-16

## 2024-07-16 NOTE — PATIENT INSTRUCTIONS
Increase coreg to 6.25 mg twice a day  Call Dr Faustin about the blood sugars in the 500s  Check your insurance to see if a glucose monitor is covered  RTO in 5 months

## 2024-07-18 NOTE — PROGRESS NOTES
Pt was advised to increase her long acting insulin to 100 units BID and patient is coming in to the office on Tuesday 7/23 for a CGM pro placement.   
behavior.  Psychiatric: No anxiety, no depression.  Endocrine: No malaise, fatigue or temperature intolerance. No excessive thirst, fluid intake, or urination. No tremor.  Hematologic/Lymphatic: No abnormal bruising or bleeding, blood clots or swollen lymph nodes.  Allergic/Immunologic: No nasal congestion or hives.    Physical Examination:    Vitals:    07/16/24 1407   BP: 132/84   Pulse: (!) 105   SpO2: 95%   Weight: 112.9 kg (249 lb)   Height: 1.549 m (5' 1\")          Constitutional and General Appearance: Warm and dry, no apparent distress, normal coloration  HEENT:  Normocephalic, atraumatic  Respiratory:  Normal excursion and expansion without use of accessory muscles  Resp Auscultation: Normal breath sounds without dullness  Cardiovascular:  The apical impulses not displaced  Heart tones are crisp and normal  JVP normal cm H2O  nsr  Peripheral pulses are symmetrical and full  There is no clubbing, cyanosis of the extremities.  no edema  Pedal Pulses: 2+ and equal   Abdomen: morbid obesity  No masses or tenderness  Liver/Spleen: No Abnormalities Noted  Neurological/Psychiatric:  Alert and oriented in all spheres  Moves all extremities well  Exhibits normal gait balance and coordination  No abnormalities of mood, affect, memory, mentation, or behavior are noted    Lab Data:    CBC:   Lab Results   Component Value Date/Time    WBC 5.1 03/07/2024 04:20 PM    WBC 5.1 02/15/2024 07:58 PM    WBC 6.7 02/13/2024 04:55 AM    RBC 3.74 03/07/2024 04:20 PM    RBC 3.81 02/15/2024 07:58 PM    RBC 3.42 02/13/2024 04:55 AM    HGB 9.7 03/07/2024 04:20 PM    HGB 9.3 02/15/2024 07:58 PM    HGB 8.3 02/13/2024 04:55 AM    HCT 30.5 03/07/2024 04:20 PM    HCT 30.7 02/15/2024 07:58 PM    HCT 27.3 02/13/2024 04:55 AM    MCV 81.6 03/07/2024 04:20 PM    MCV 80.7 02/15/2024 07:58 PM    MCV 79.8 02/13/2024 04:55 AM    RDW 24.3 03/07/2024 04:20 PM    RDW 22.2 02/15/2024 07:58 PM    RDW 20.8 02/13/2024 04:55 AM     03/07/2024 04:20

## 2024-07-23 ENCOUNTER — NURSE ONLY (OUTPATIENT)
Dept: ENDOCRINOLOGY | Age: 74
End: 2024-07-23

## 2024-07-23 DIAGNOSIS — E11.8 POORLY CONTROLLED TYPE 2 DIABETES MELLITUS WITH COMPLICATION (HCC): ICD-10-CM

## 2024-07-23 DIAGNOSIS — E11.65 POORLY CONTROLLED TYPE 2 DIABETES MELLITUS WITH COMPLICATION (HCC): ICD-10-CM

## 2024-07-23 RX ORDER — TIRZEPATIDE 12.5 MG/.5ML
INJECTION, SOLUTION SUBCUTANEOUS
Qty: 6 ML | Refills: 1 | Status: SHIPPED | OUTPATIENT
Start: 2024-07-23

## 2024-07-23 NOTE — PROGRESS NOTES
Patient presented for a CGM Pro placement. Dexcom Pro sensor placed on patient's... Patient to return in 10 days to have sensor removed and data downloaded.

## 2024-08-01 ENCOUNTER — TELEPHONE (OUTPATIENT)
Dept: ENDOCRINOLOGY | Age: 74
End: 2024-08-01
Payer: MEDICARE

## 2024-08-01 ENCOUNTER — NURSE ONLY (OUTPATIENT)
Dept: ENDOCRINOLOGY | Age: 74
End: 2024-08-01

## 2024-08-01 DIAGNOSIS — E11.8 POORLY CONTROLLED TYPE 2 DIABETES MELLITUS WITH COMPLICATION (HCC): Primary | ICD-10-CM

## 2024-08-01 DIAGNOSIS — E11.65 POORLY CONTROLLED TYPE 2 DIABETES MELLITUS WITH COMPLICATION (HCC): Primary | ICD-10-CM

## 2024-08-01 PROCEDURE — 95251 CONT GLUC MNTR ANALYSIS I&R: CPT | Performed by: INTERNAL MEDICINE

## 2024-08-01 RX ORDER — INSULIN ASPART 100 [IU]/ML
INJECTION, SOLUTION INTRAVENOUS; SUBCUTANEOUS
Qty: 30 ML | Refills: 1 | Status: SHIPPED | OUTPATIENT
Start: 2024-08-01

## 2024-08-01 RX ORDER — INSULIN LISPRO 100 [IU]/ML
INJECTION, SOLUTION INTRAVENOUS; SUBCUTANEOUS
Qty: 5 ADJUSTABLE DOSE PRE-FILLED PEN SYRINGE | Refills: 3 | Status: SHIPPED | OUTPATIENT
Start: 2024-08-01

## 2024-08-01 NOTE — TELEPHONE ENCOUNTER
ARISTEO... patient agreeable to starting Novolog. Rx sent to [facility] pharmacy. Patient had trouble getting Mounjaro 12.5 mg and is currently taking 10 mg until 12.5 mg is back in stock. Reports tolerating it well.

## 2024-08-01 NOTE — TELEPHONE ENCOUNTER
Diabetes Continuous Glucose Monitoring Report         Reason for Study:     - improve diabetic control without risk of hypoglycemia       Current Medication regimen:   Basal Bolus insulin regimen      CGMS Report     CGMS data collection was performed on 8/1/2024  Patient provided information on her  diet, activities and insulin dosing  during this period.   Data was available for 7+ days     Sensor Data Report:   - 12 AM to 6 AM: Overnight blood glucose pattern shows stable glycemia  - 6   AM to 10 AM:  Post breakfast hyperglycemia is noted  --10AM to 5 PM : No hypoglycemia observed during this time.  - 5   PM to 8 PM: Post meal hyperglycemia is noted       Average reading 269 mg/dL     Standard Dev 46   mg/dL   % of time <70 mg/dL 0%    % of time >180  mg/dL 99% %   % of time within range 1%  Number of hypoglycemia episodes noted: 0     Impression:   CGMS shows stable glycemia overnight with postprandial hyperglycemia     Recommendation:      Patient was advised to make the following changes in her diabetic regimen  Increase basal insulin  Increase meal boluses  Take meal boluses 10 minutes prior to eating

## 2024-08-01 NOTE — TELEPHONE ENCOUNTER
LMOM for patient to call office. If agreeable to Novolog please route back to  to send to pharmacy.

## 2024-08-01 NOTE — PROGRESS NOTES
Patient present today for CGM download. Patient reports no problems while wearing sensor and skin is free of irritation or inflammation as application site.     Report printed and routed to MD in telephone encounter.

## 2024-08-02 NOTE — TELEPHONE ENCOUNTER
Fax from [facility]    We rec'd 2 rx's written on the same date for Humalog U100 kwikpen inj and Novolog u100 flexpen    Please clarify current therapy

## 2024-08-23 ENCOUNTER — TELEPHONE (OUTPATIENT)
Dept: ENDOCRINOLOGY | Age: 74
End: 2024-08-23

## 2024-08-23 DIAGNOSIS — E11.8 POORLY CONTROLLED TYPE 2 DIABETES MELLITUS WITH COMPLICATION (HCC): Primary | ICD-10-CM

## 2024-08-23 DIAGNOSIS — E11.65 POORLY CONTROLLED TYPE 2 DIABETES MELLITUS WITH COMPLICATION (HCC): Primary | ICD-10-CM

## 2024-08-23 RX ORDER — INSULIN DEGLUDEC 200 U/ML
INJECTION, SOLUTION SUBCUTANEOUS
Qty: 15 ADJUSTABLE DOSE PRE-FILLED PEN SYRINGE | Refills: 4 | Status: SHIPPED | OUTPATIENT
Start: 2024-08-23

## 2024-08-23 NOTE — TELEPHONE ENCOUNTER
Patient called requesting a refill     Rx- Insulin Degludec (TRESIBA FLEXTOUCH) 200 UNIT/ML    [place] [address]     LOV-  NOV- 9/4/24    Please advise

## 2024-08-27 ENCOUNTER — TELEPHONE (OUTPATIENT)
Dept: ENDOCRINOLOGY | Age: 74
End: 2024-08-27

## 2024-08-27 DIAGNOSIS — E11.65 POORLY CONTROLLED TYPE 2 DIABETES MELLITUS WITH COMPLICATION (HCC): ICD-10-CM

## 2024-08-27 DIAGNOSIS — E11.8 POORLY CONTROLLED TYPE 2 DIABETES MELLITUS WITH COMPLICATION (HCC): ICD-10-CM

## 2024-08-27 RX ORDER — INSULIN DEGLUDEC 200 U/ML
INJECTION, SOLUTION SUBCUTANEOUS
Qty: 42 ML | Refills: 1 | Status: SHIPPED | OUTPATIENT
Start: 2024-08-27

## 2024-08-27 NOTE — TELEPHONE ENCOUNTER
Submitted PA for Tresiba  [facility]ia [facility]: [id number] STATUS: PENDING.    Follow up done daily; if no decision with in three days we will refax.  If another three days goes by with no decision will call the insurance for status.

## 2024-08-27 NOTE — TELEPHONE ENCOUNTER
Denial states prescription sent for 102 days and they will only approve a 90 day or 30 day supply. Adjusted quantity and updated rx sent.

## 2024-08-30 ENCOUNTER — TELEPHONE (OUTPATIENT)
Dept: ENDOCRINOLOGY | Age: 74
End: 2024-08-30

## 2024-08-30 NOTE — TELEPHONE ENCOUNTER
Pt needs rx sent to     [facility] [address] [facility]     For her Tresiba    She is almost out

## 2024-09-04 ENCOUNTER — OFFICE VISIT (OUTPATIENT)
Dept: ENDOCRINOLOGY | Age: 74
End: 2024-09-04
Payer: MEDICARE

## 2024-09-04 VITALS
HEART RATE: 94 BPM | BODY MASS INDEX: 47.58 KG/M2 | RESPIRATION RATE: 16 BRPM | HEIGHT: 61 IN | SYSTOLIC BLOOD PRESSURE: 116 MMHG | WEIGHT: 252 LBS | DIASTOLIC BLOOD PRESSURE: 63 MMHG

## 2024-09-04 DIAGNOSIS — E11.8 POORLY CONTROLLED TYPE 2 DIABETES MELLITUS WITH COMPLICATION (HCC): ICD-10-CM

## 2024-09-04 DIAGNOSIS — E11.65 POORLY CONTROLLED TYPE 2 DIABETES MELLITUS WITH COMPLICATION (HCC): Primary | ICD-10-CM

## 2024-09-04 DIAGNOSIS — I50.43 CHF (CONGESTIVE HEART FAILURE), NYHA CLASS I, ACUTE ON CHRONIC, COMBINED (HCC): ICD-10-CM

## 2024-09-04 DIAGNOSIS — I50.9 ACUTE ON CHRONIC CONGESTIVE HEART FAILURE, UNSPECIFIED HEART FAILURE TYPE (HCC): ICD-10-CM

## 2024-09-04 DIAGNOSIS — E11.8 POORLY CONTROLLED TYPE 2 DIABETES MELLITUS WITH COMPLICATION (HCC): Primary | ICD-10-CM

## 2024-09-04 DIAGNOSIS — I25.10 CAD IN NATIVE ARTERY: ICD-10-CM

## 2024-09-04 DIAGNOSIS — I10 PRIMARY HYPERTENSION: ICD-10-CM

## 2024-09-04 DIAGNOSIS — E11.65 POORLY CONTROLLED TYPE 2 DIABETES MELLITUS WITH COMPLICATION (HCC): ICD-10-CM

## 2024-09-04 DIAGNOSIS — J96.21 ACUTE ON CHRONIC RESPIRATORY FAILURE WITH HYPOXIA (HCC): ICD-10-CM

## 2024-09-04 LAB
CREAT UR-MCNC: 23.6 MG/DL (ref 28–259)
MICROALBUMIN UR DL<=1MG/L-MCNC: <1.2 MG/DL
MICROALBUMIN/CREAT UR: ABNORMAL MG/G (ref 0–30)

## 2024-09-04 PROCEDURE — 1123F ACP DISCUSS/DSCN MKR DOCD: CPT | Performed by: INTERNAL MEDICINE

## 2024-09-04 PROCEDURE — 3017F COLORECTAL CA SCREEN DOC REV: CPT | Performed by: INTERNAL MEDICINE

## 2024-09-04 PROCEDURE — G8400 PT W/DXA NO RESULTS DOC: HCPCS | Performed by: INTERNAL MEDICINE

## 2024-09-04 PROCEDURE — 2022F DILAT RTA XM EVC RTNOPTHY: CPT | Performed by: INTERNAL MEDICINE

## 2024-09-04 PROCEDURE — 3044F HG A1C LEVEL LT 7.0%: CPT | Performed by: INTERNAL MEDICINE

## 2024-09-04 PROCEDURE — 95251 CONT GLUC MNTR ANALYSIS I&R: CPT | Performed by: INTERNAL MEDICINE

## 2024-09-04 PROCEDURE — G2211 COMPLEX E/M VISIT ADD ON: HCPCS | Performed by: INTERNAL MEDICINE

## 2024-09-04 PROCEDURE — 1090F PRES/ABSN URINE INCON ASSESS: CPT | Performed by: INTERNAL MEDICINE

## 2024-09-04 PROCEDURE — 3074F SYST BP LT 130 MM HG: CPT | Performed by: INTERNAL MEDICINE

## 2024-09-04 PROCEDURE — 3078F DIAST BP <80 MM HG: CPT | Performed by: INTERNAL MEDICINE

## 2024-09-04 PROCEDURE — 1036F TOBACCO NON-USER: CPT | Performed by: INTERNAL MEDICINE

## 2024-09-04 PROCEDURE — G8427 DOCREV CUR MEDS BY ELIG CLIN: HCPCS | Performed by: INTERNAL MEDICINE

## 2024-09-04 PROCEDURE — 99214 OFFICE O/P EST MOD 30 MIN: CPT | Performed by: INTERNAL MEDICINE

## 2024-09-04 PROCEDURE — G8417 CALC BMI ABV UP PARAM F/U: HCPCS | Performed by: INTERNAL MEDICINE

## 2024-09-05 LAB
ALBUMIN SERPL-MCNC: 4.4 G/DL (ref 3.4–5)
ALBUMIN/GLOB SERPL: 1.7 {RATIO} (ref 1.1–2.2)
ALP SERPL-CCNC: 90 U/L (ref 40–129)
ALT SERPL-CCNC: 17 U/L (ref 10–40)
ANION GAP SERPL CALCULATED.3IONS-SCNC: 14 MMOL/L (ref 3–16)
AST SERPL-CCNC: 23 U/L (ref 15–37)
BILIRUB SERPL-MCNC: 0.5 MG/DL (ref 0–1)
BUN SERPL-MCNC: 17 MG/DL (ref 7–20)
CALCIUM SERPL-MCNC: 9.8 MG/DL (ref 8.3–10.6)
CHLORIDE SERPL-SCNC: 98 MMOL/L (ref 99–110)
CHOLEST SERPL-MCNC: 98 MG/DL (ref 0–199)
CO2 SERPL-SCNC: 26 MMOL/L (ref 21–32)
CREAT SERPL-MCNC: 0.7 MG/DL (ref 0.6–1.2)
EST. AVERAGE GLUCOSE BLD GHB EST-MCNC: 151.3 MG/DL
GFR SERPLBLD CREATININE-BSD FMLA CKD-EPI: >90 ML/MIN/{1.73_M2}
GLUCOSE SERPL-MCNC: 280 MG/DL (ref 70–99)
HBA1C MFR BLD: 6.9 %
HDLC SERPL-MCNC: 35 MG/DL (ref 40–60)
LDLC SERPL CALC-MCNC: 32 MG/DL
POTASSIUM SERPL-SCNC: 4.5 MMOL/L (ref 3.5–5.1)
PROT SERPL-MCNC: 7 G/DL (ref 6.4–8.2)
SODIUM SERPL-SCNC: 138 MMOL/L (ref 136–145)
TRIGL SERPL-MCNC: 156 MG/DL (ref 0–150)
TSH SERPL DL<=0.005 MIU/L-ACNC: 2.13 UIU/ML (ref 0.27–4.2)
VLDLC SERPL CALC-MCNC: 31 MG/DL

## 2024-09-13 ENCOUNTER — TELEPHONE (OUTPATIENT)
Dept: CARDIOLOGY CLINIC | Age: 74
End: 2024-09-13

## 2024-09-13 NOTE — TELEPHONE ENCOUNTER
Her labs look ok that she had done last week. Has she been losing or gaining wt? We can repeat her labs before her OV with RG next week. ANA

## 2024-09-13 NOTE — TELEPHONE ENCOUNTER
Spoke with patient,  her weight did go up by 4 pounds but she took 2 Furosemide instead of the 1.5 tab dose and she has lost 2 pounds.   She agreed to having blood work re drawn prior to her appointment with NPRG.

## 2024-09-13 NOTE — TELEPHONE ENCOUNTER
Pt called in stating she feels very tired/ weak, lightheaded, more SOB than usual, and its hard for her to stay awake during the day due to the fatigue,  denies any CP. Please call pt and discuss   Thank you

## 2024-09-13 NOTE — TELEPHONE ENCOUNTER
Spoke to patient she has been feeling weak and tired for the past 4-5 days, no energy, dizzy occasionally during the day and feels of balance, sob with exertion, on oxygen 2 L, she had labs done 09/04/2024 for Dr. Faustin does she need more labs done, she has had IV iron infusion before and will have labs done Monday at [facility], schedule patient to see NPRG 09/20/2024 at 1 pm

## 2024-10-07 DIAGNOSIS — E11.65 POORLY CONTROLLED TYPE 2 DIABETES MELLITUS WITH COMPLICATION (HCC): ICD-10-CM

## 2024-10-07 DIAGNOSIS — E11.8 POORLY CONTROLLED TYPE 2 DIABETES MELLITUS WITH COMPLICATION (HCC): ICD-10-CM

## 2024-10-07 RX ORDER — INSULIN DEGLUDEC 200 U/ML
INJECTION, SOLUTION SUBCUTANEOUS
Qty: 30 ADJUSTABLE DOSE PRE-FILLED PEN SYRINGE | Refills: 1 | Status: SHIPPED | OUTPATIENT
Start: 2024-10-07

## 2024-10-07 NOTE — TELEPHONE ENCOUNTER
Pt calling and states her pharmacy told her to call us because she is using almost 100u  2x daily of her Tresiba Flextouch and will need her rx updated. She is running out before she needs refill    [facility] [address] [facility]# [phone]

## 2024-10-07 NOTE — TELEPHONE ENCOUNTER
Per last visit patient is taking up to 100 units BID. Pended to reflect this for a 90 day supply.     Medication:   Requested Prescriptions     Pending Prescriptions Disp Refills    Insulin Degludec (TRESIBA FLEXTOUCH) 200 UNIT/ML SOPN 42 mL 1     Sig: Take upto 100 units BID       Last Filled:      Patient Phone Number: [phone] (home)     Last appt: 9/4/2024   Next appt: 1/22/2025    Last Labs DM:   Lab Results   Component Value Date/Time    LABA1C 6.9 09/04/2024 02:43 PM

## 2024-10-10 DIAGNOSIS — E11.8 POORLY CONTROLLED TYPE 2 DIABETES MELLITUS WITH COMPLICATION (HCC): Primary | ICD-10-CM

## 2024-10-10 DIAGNOSIS — E11.65 POORLY CONTROLLED TYPE 2 DIABETES MELLITUS WITH COMPLICATION (HCC): Primary | ICD-10-CM

## 2024-10-10 RX ORDER — PEN NEEDLE, DIABETIC 30 GX3/16"
NEEDLE, DISPOSABLE MISCELLANEOUS
Qty: 200 EACH | Refills: 5 | Status: SHIPPED | OUTPATIENT
Start: 2024-10-10

## 2024-10-10 NOTE — TELEPHONE ENCOUNTER
Patient called requesting a refill     Rx- Pen needles 34G x 9-64     Pharmacy- [facility] [id number]    LOV-  NOV- 1/22/25    Please advise

## 2024-10-21 DIAGNOSIS — E11.65 POORLY CONTROLLED TYPE 2 DIABETES MELLITUS WITH COMPLICATION (HCC): ICD-10-CM

## 2024-10-21 DIAGNOSIS — E11.8 POORLY CONTROLLED TYPE 2 DIABETES MELLITUS WITH COMPLICATION (HCC): ICD-10-CM

## 2024-10-21 RX ORDER — EMPAGLIFLOZIN 25 MG/1
25 TABLET, FILM COATED ORAL DAILY
Qty: 30 TABLET | Refills: 3 | Status: SHIPPED | OUTPATIENT
Start: 2024-10-21

## 2024-10-25 ENCOUNTER — HOSPITAL ENCOUNTER (OUTPATIENT)
Dept: GENERAL RADIOLOGY | Age: 74
Discharge: HOME | End: 2024-10-25
Attending: INTERNAL MEDICINE
Payer: MEDICARE

## 2024-10-25 DIAGNOSIS — Z78.0 MENOPAUSE: ICD-10-CM

## 2024-10-25 PROCEDURE — 77080 DXA BONE DENSITY AXIAL: CPT

## 2024-11-18 ENCOUNTER — TELEPHONE (OUTPATIENT)
Dept: ENDOCRINOLOGY | Age: 74
End: 2024-11-18

## 2024-11-18 DIAGNOSIS — E11.8 POORLY CONTROLLED TYPE 2 DIABETES MELLITUS WITH COMPLICATION (HCC): Primary | ICD-10-CM

## 2024-11-18 DIAGNOSIS — E11.65 POORLY CONTROLLED TYPE 2 DIABETES MELLITUS WITH COMPLICATION (HCC): Primary | ICD-10-CM

## 2024-11-18 RX ORDER — LANCETS 30 GAUGE
EACH MISCELLANEOUS
Qty: 200 EACH | Refills: 5 | Status: SHIPPED | OUTPATIENT
Start: 2024-11-18

## 2024-11-18 NOTE — TELEPHONE ENCOUNTER
Pt calling and states she needs refill on her Lancets    [facility] [address]    # [phone]

## 2024-11-29 ENCOUNTER — TELEPHONE (OUTPATIENT)
Dept: CARDIOLOGY CLINIC | Age: 74
End: 2024-11-29

## 2024-11-29 NOTE — TELEPHONE ENCOUNTER
Take an extra 20 mg of furosemide now, then tomorrow increase furosemide to 40 mg daily until weight back to 257, then go back to 30 mg furosemide.  (Will likely need a new rx for lasix).  Needs a follow up with RG in the next month if not already scheduled.  MACY

## 2024-11-29 NOTE — TELEPHONE ENCOUNTER
Pt states she had an episode about 430 am coughing, racing and pressure in chest. Pt has gained 3 lbs over night and is fatigued. States her nose is running a clear liquid. Pt did not have her BP. Please call to advise.

## 2024-11-29 NOTE — TELEPHONE ENCOUNTER
Spoke to patient she had a episode of coughing and heart racing and chest pressure about 4:30 am it lasted 40 minutes, then she started to settle down her heart was slow, she doesn't have a cold, she was sob at the time and still has sob with exertion since yesterday, when eating or talking she felt smothered, current weight 263, she was 257, Rt leg is numb but she is walking ok, hands are swollen more than usual  Taking furosemide 30 mg daily, carvedilol 6.25  bid, jardiance 25 mg daily all other medications taking as prescribed.    Spoke to patient she verbalized understanding.

## 2024-12-16 ENCOUNTER — OFFICE VISIT (OUTPATIENT)
Dept: CARDIOLOGY CLINIC | Age: 74
End: 2024-12-16
Payer: MEDICARE

## 2024-12-16 VITALS
SYSTOLIC BLOOD PRESSURE: 102 MMHG | BODY MASS INDEX: 49.81 KG/M2 | WEIGHT: 263.8 LBS | OXYGEN SATURATION: 94 % | HEART RATE: 96 BPM | DIASTOLIC BLOOD PRESSURE: 60 MMHG | HEIGHT: 61 IN

## 2024-12-16 DIAGNOSIS — I25.84 CORONARY ARTERY DISEASE DUE TO CALCIFIED CORONARY LESION: ICD-10-CM

## 2024-12-16 DIAGNOSIS — I50.32 CHRONIC DIASTOLIC HEART FAILURE (HCC): Primary | ICD-10-CM

## 2024-12-16 DIAGNOSIS — E66.01 MORBID OBESITY WITH BMI OF 50.0-59.9, ADULT: ICD-10-CM

## 2024-12-16 DIAGNOSIS — I25.10 CORONARY ARTERY DISEASE DUE TO CALCIFIED CORONARY LESION: ICD-10-CM

## 2024-12-16 DIAGNOSIS — G47.33 OSA (OBSTRUCTIVE SLEEP APNEA): ICD-10-CM

## 2024-12-16 PROCEDURE — G8427 DOCREV CUR MEDS BY ELIG CLIN: HCPCS | Performed by: CLINICAL NURSE SPECIALIST

## 2024-12-16 PROCEDURE — 99214 OFFICE O/P EST MOD 30 MIN: CPT | Performed by: CLINICAL NURSE SPECIALIST

## 2024-12-16 PROCEDURE — 1090F PRES/ABSN URINE INCON ASSESS: CPT | Performed by: CLINICAL NURSE SPECIALIST

## 2024-12-16 PROCEDURE — 1036F TOBACCO NON-USER: CPT | Performed by: CLINICAL NURSE SPECIALIST

## 2024-12-16 PROCEDURE — 3074F SYST BP LT 130 MM HG: CPT | Performed by: CLINICAL NURSE SPECIALIST

## 2024-12-16 PROCEDURE — 1159F MED LIST DOCD IN RCRD: CPT | Performed by: CLINICAL NURSE SPECIALIST

## 2024-12-16 PROCEDURE — 3078F DIAST BP <80 MM HG: CPT | Performed by: CLINICAL NURSE SPECIALIST

## 2024-12-16 PROCEDURE — G8484 FLU IMMUNIZE NO ADMIN: HCPCS | Performed by: CLINICAL NURSE SPECIALIST

## 2024-12-16 PROCEDURE — 1160F RVW MEDS BY RX/DR IN RCRD: CPT | Performed by: CLINICAL NURSE SPECIALIST

## 2024-12-16 PROCEDURE — 1123F ACP DISCUSS/DSCN MKR DOCD: CPT | Performed by: CLINICAL NURSE SPECIALIST

## 2024-12-16 PROCEDURE — 3017F COLORECTAL CA SCREEN DOC REV: CPT | Performed by: CLINICAL NURSE SPECIALIST

## 2024-12-16 PROCEDURE — G8417 CALC BMI ABV UP PARAM F/U: HCPCS | Performed by: CLINICAL NURSE SPECIALIST

## 2024-12-16 PROCEDURE — G8399 PT W/DXA RESULTS DOCUMENT: HCPCS | Performed by: CLINICAL NURSE SPECIALIST

## 2024-12-16 RX ORDER — CARVEDILOL 6.25 MG/1
6.25 TABLET ORAL 2 TIMES DAILY
Qty: 60 TABLET | Refills: 0 | Status: SHIPPED | OUTPATIENT
Start: 2024-12-16

## 2024-12-16 RX ORDER — SPIRONOLACTONE 25 MG/1
25 TABLET ORAL DAILY
Qty: 30 TABLET | Refills: 1 | Status: SHIPPED | OUTPATIENT
Start: 2024-12-16

## 2024-12-16 NOTE — PATIENT INSTRUCTIONS
Start aldactone 25 mg once a day  Stop the potassium   Ok to use claritin without the D for decongestant  Blood work in 3 weeks  Continue all other medications  RTO in 5 months

## 2024-12-16 NOTE — PROGRESS NOTES
[facility]  Progress Note    Primary Care Doctor:  Pricilla Duran MD    Chief Complaint   Patient presents with    Congestive Heart Failure     SOB    Hypertension    Chest Pain    Hyperlipidemia    Follow-up        History of Present Illness:  74 y.o. female with history of hypertension, diastolic heart failure, hyperlipidemia, diabetes, prior history of PE, morbid obesity and krishna on oxygen at night and to wear during the day.  Breast cancer with chemo  3/20-27/23 for shortness of breath, diuresed with IV lasix, UTI (cipro).  She was hospitalized at the end of April with chest pain. She was not a candidate for [facility] at the time d/t renal impairment (hyperkalemia). (Per Dr Soto note)  PCI with Dr Soto 6/14-15/2023 to     I had the pleasure of seeing Brandi Gomez in follow up for diastolic heart failure.  She is ambulatory and by her self.  She is on 2 L  of oxygen. She continues to get IV iron at Dr Valle office.  Her weight has increased from 249-252-263 (at home 260-266).  She did increase her lasix for a few days but this did not help her weight.  She states her blood sugars have been elevated sometimes in the 300s (upcoming appt with Dr Faustin in jan).  She has never been on aldactone and does take potassium pill.  No chest pain, increased shortness of breath or lightheadedness.      Past Medical History:   has a past medical history of Asthma, Back pain, Bipolar 1 disorder (HCC), CAD (coronary artery disease), Cancer (HCC), Cataract, CHF (congestive heart failure) (HCC), Cirrhosis (HCC), COPD (chronic obstructive pulmonary disease) (HCC), Depression, Diabetes mellitus (HCC), GERD (gastroesophageal reflux disease), Hx of blood clots, Hypercholesteremia, Hyperlipidemia, Hypertension, Morbid obesity with body mass index (BMI) greater than or equal to 50, Osteoporosis, Pancytopenia (HCC), Right rib fracture, RSV bronchitis, Sciatica, and Tachycardia.  Surgical History:   has a past surgical

## 2025-01-02 ENCOUNTER — TELEPHONE (OUTPATIENT)
Dept: CARDIOLOGY CLINIC | Age: 75
End: 2025-01-02

## 2025-01-02 NOTE — TELEPHONE ENCOUNTER
Patient wants to know if her Oxygen level is above 90 is it ok for her to take her oxygen off.  Please call to advise [phone]

## 2025-01-03 NOTE — TELEPHONE ENCOUNTER
Advised pt to call pulmonology to answer her oxygen questions.  She is at 94%. She states she fees fine and no SOB. I advised her to call pulmonology, Sanam Holt MD (who she saw last on 11/21/24) to get some parameters. Also explained to her that if she starts feeling any symptoms at all, she needs to immediately put her oxygen on. And she should keep it on until she speaks with pulmonologist. Pt states verbal understanding and states she will call Sanam Holt MD.

## 2025-02-05 NOTE — TELEPHONE ENCOUNTER
Medication Refill    Medication needing refilled:   spironolactone (ALDACTONE) 25 MG tablet     Dosage of the medication: 25mg    How are you taking this medication (QD, BID, TID, QID, PRN): Take 1 tablet by mouth daily     30 or 90 day supply called in: 90    When will you run out of your medication:  9 days    Which Pharmacy are we sending the medication to?:  [id number] - [place], [place] - [address] - P [phone] - F [phone]

## 2025-02-07 ENCOUNTER — HOSPITAL ENCOUNTER (OUTPATIENT)
Age: 75
Discharge: HOME | End: 2025-02-07
Payer: MEDICARE

## 2025-02-07 DIAGNOSIS — I50.32 CHRONIC DIASTOLIC HEART FAILURE (HCC): ICD-10-CM

## 2025-02-07 LAB
ANION GAP SERPL CALCULATED.3IONS-SCNC: 9 MMOL/L (ref 3–16)
BUN SERPL-MCNC: 17 MG/DL (ref 7–20)
CALCIUM SERPL-MCNC: 9.5 MG/DL (ref 8.3–10.6)
CHLORIDE SERPL-SCNC: 98 MMOL/L (ref 99–110)
CO2 SERPL-SCNC: 30 MMOL/L (ref 21–32)
CREAT SERPL-MCNC: 0.8 MG/DL (ref 0.6–1.2)
GFR SERPLBLD CREATININE-BSD FMLA CKD-EPI: 78 ML/MIN/{1.73_M2}
GLUCOSE SERPL-MCNC: 230 MG/DL (ref 70–99)
NT-PROBNP SERPL-MCNC: <36 PG/ML (ref 0–124)
POTASSIUM SERPL-SCNC: 4.6 MMOL/L (ref 3.5–5.1)
SODIUM SERPL-SCNC: 137 MMOL/L (ref 136–145)

## 2025-02-07 PROCEDURE — 36415 COLL VENOUS BLD VENIPUNCTURE: CPT

## 2025-02-07 PROCEDURE — 80048 BASIC METABOLIC PNL TOTAL CA: CPT

## 2025-02-07 PROCEDURE — 83880 ASSAY OF NATRIURETIC PEPTIDE: CPT

## 2025-02-10 RX ORDER — SPIRONOLACTONE 25 MG/1
25 TABLET ORAL DAILY
Qty: 90 TABLET | Refills: 0 | Status: SHIPPED | OUTPATIENT
Start: 2025-02-10

## 2025-02-11 RX ORDER — SPIRONOLACTONE 25 MG/1
25 TABLET ORAL DAILY
Qty: 30 TABLET | OUTPATIENT
Start: 2025-02-11

## 2025-02-11 NOTE — TELEPHONE ENCOUNTER
Received refill request for  spironolactone (ALDACTONE) 25 MG tablet  from [facility] pharmacy.     Last OV: 12/16/2024 NPRG    Next OV: 3/20/2025 NPRG    Last Labs: 2/7/2025 BMP    Last Filled:

## 2025-02-12 ENCOUNTER — TELEPHONE (OUTPATIENT)
Dept: CARDIOLOGY CLINIC | Age: 75
End: 2025-02-12

## 2025-02-12 NOTE — TELEPHONE ENCOUNTER
Per NPRG 02/10/2025  Brandi  Blood work is good  Continue current medications  Thanks ...    Tried to reach patient LMOM for her to return our call with any questions or concerns.  Patient read her Rezdy message about lab results.

## 2025-02-17 DIAGNOSIS — E11.65 POORLY CONTROLLED TYPE 2 DIABETES MELLITUS WITH COMPLICATION (HCC): ICD-10-CM

## 2025-02-17 DIAGNOSIS — E11.8 POORLY CONTROLLED TYPE 2 DIABETES MELLITUS WITH COMPLICATION (HCC): ICD-10-CM

## 2025-02-17 RX ORDER — EMPAGLIFLOZIN 25 MG/1
25 TABLET, FILM COATED ORAL DAILY
Qty: 30 TABLET | Refills: 3 | Status: SHIPPED | OUTPATIENT
Start: 2025-02-17

## 2025-02-28 ENCOUNTER — TELEPHONE (OUTPATIENT)
Dept: CARDIOLOGY CLINIC | Age: 75
End: 2025-02-28

## 2025-02-28 NOTE — TELEPHONE ENCOUNTER
Pt called to inform the office that she is having right shoulder surgery March 24th. Pt was given [facility] fax number so she can give it to the doctor to fax in the clearance.   ARISTEO

## 2025-03-06 NOTE — TELEPHONE ENCOUNTER
Tried to reach patient LMOM asking her to reach out to the surgeons office to send us a cardiac clearance form. Asked her to call with any questions or concerns

## 2025-03-10 ENCOUNTER — OFFICE VISIT (OUTPATIENT)
Dept: PULMONOLOGY | Age: 75
End: 2025-03-10
Payer: MEDICARE

## 2025-03-10 VITALS
OXYGEN SATURATION: 95 % | SYSTOLIC BLOOD PRESSURE: 118 MMHG | DIASTOLIC BLOOD PRESSURE: 72 MMHG | HEART RATE: 88 BPM | HEIGHT: 61 IN | BODY MASS INDEX: 49.84 KG/M2

## 2025-03-10 DIAGNOSIS — R06.02 SHORTNESS OF BREATH: ICD-10-CM

## 2025-03-10 DIAGNOSIS — I10 PRIMARY HYPERTENSION: ICD-10-CM

## 2025-03-10 DIAGNOSIS — G47.33 OSA (OBSTRUCTIVE SLEEP APNEA): ICD-10-CM

## 2025-03-10 DIAGNOSIS — E66.813 CLASS 3 SEVERE OBESITY DUE TO EXCESS CALORIES WITH SERIOUS COMORBIDITY AND BODY MASS INDEX (BMI) OF 50.0 TO 59.9 IN ADULT: ICD-10-CM

## 2025-03-10 DIAGNOSIS — E66.01 CLASS 3 SEVERE OBESITY DUE TO EXCESS CALORIES WITH SERIOUS COMORBIDITY AND BODY MASS INDEX (BMI) OF 50.0 TO 59.9 IN ADULT: ICD-10-CM

## 2025-03-10 DIAGNOSIS — J45.20 MILD INTERMITTENT ASTHMA WITHOUT COMPLICATION: Primary | ICD-10-CM

## 2025-03-10 PROCEDURE — G2211 COMPLEX E/M VISIT ADD ON: HCPCS | Performed by: INTERNAL MEDICINE

## 2025-03-10 PROCEDURE — 3078F DIAST BP <80 MM HG: CPT | Performed by: INTERNAL MEDICINE

## 2025-03-10 PROCEDURE — 1123F ACP DISCUSS/DSCN MKR DOCD: CPT | Performed by: INTERNAL MEDICINE

## 2025-03-10 PROCEDURE — G8417 CALC BMI ABV UP PARAM F/U: HCPCS | Performed by: INTERNAL MEDICINE

## 2025-03-10 PROCEDURE — 1036F TOBACCO NON-USER: CPT | Performed by: INTERNAL MEDICINE

## 2025-03-10 PROCEDURE — 1090F PRES/ABSN URINE INCON ASSESS: CPT | Performed by: INTERNAL MEDICINE

## 2025-03-10 PROCEDURE — G8399 PT W/DXA RESULTS DOCUMENT: HCPCS | Performed by: INTERNAL MEDICINE

## 2025-03-10 PROCEDURE — 1159F MED LIST DOCD IN RCRD: CPT | Performed by: INTERNAL MEDICINE

## 2025-03-10 PROCEDURE — 3074F SYST BP LT 130 MM HG: CPT | Performed by: INTERNAL MEDICINE

## 2025-03-10 PROCEDURE — 99214 OFFICE O/P EST MOD 30 MIN: CPT | Performed by: INTERNAL MEDICINE

## 2025-03-10 PROCEDURE — 3017F COLORECTAL CA SCREEN DOC REV: CPT | Performed by: INTERNAL MEDICINE

## 2025-03-10 PROCEDURE — G8427 DOCREV CUR MEDS BY ELIG CLIN: HCPCS | Performed by: INTERNAL MEDICINE

## 2025-03-10 RX ORDER — IPRATROPIUM BROMIDE AND ALBUTEROL SULFATE 2.5; .5 MG/3ML; MG/3ML
1 SOLUTION RESPIRATORY (INHALATION) EVERY 4 HOURS
Qty: 360 ML | Refills: 5 | Status: SHIPPED | OUTPATIENT
Start: 2025-03-10

## 2025-03-10 RX ORDER — ALPRAZOLAM 0.5 MG
0.5 TABLET ORAL NIGHTLY PRN
COMMUNITY

## 2025-03-10 RX ORDER — TIOTROPIUM BROMIDE INHALATION SPRAY 1.56 UG/1
2 SPRAY, METERED RESPIRATORY (INHALATION) DAILY
COMMUNITY
Start: 2025-03-06 | End: 2025-03-10

## 2025-03-10 NOTE — PROGRESS NOTES
PULMONARY OFFICE FOLLOW-UP VISIT    CONSULTING PHYSICIAN:  Pricilla Duran MD     REASON FOR VISIT:   Chief Complaint   Patient presents with    COPD       DATE OF VISIT: 3/10/2025    HISTORY OF PRESENT ILLNESS: 74 y.o. year old female comes into the pulmonary office for a follow-up after long time.  Patient reports that she has been dealing with several health related issues and was unable to come to the pulmonary office.  Over time her breathing has become worse.  With any exertion she feels short of breath.  Also has wheezing.  Off-and-on has cough productive of yellowish expectoration.  Denies any chest pain or chest tightness.  Using oxygen at 2 L/min all the time.  At nighttime she has seen herself desaturate even with oxygen.  Denies any pedal edema.  Wakes up tired and feels sleepy during the day.  Has not been able to lose any weight.      Previously: Since the last clinic visit patient underwent a transthoracic echocardiogram showing heart failure with preservation fraction again.  Also underwent left heart catheterization with PCI to left marginal coronary artery.  Patient also found to have anemia and is planned to undergo iron transfusion.  Reports that she still feels dyspneic on exertion.  Denies any wheezing or cough.  Does get tired easily.  Has been using oxygen at 2 L/min with oxygen saturation mostly staying above 90%.  Weight is about the same.  Still continues to have poor quality sleep.  Off-and-on she has bilateral pedal edema.  She often forgets to take Spiriva Respimat.  Also has not been taking albuterol. Patient reports that for the last 2 years she has been having progressively worsening shortness of breath mostly with exertion.  For the last 3 weeks this has been worse.  She gets short of breath with minimal exertion does occasionally wheeze.  Denies any significant cough.  Intermittently does have dry cough.  No discolored expectoration.  She has been on oxygen for the last 3 to

## 2025-03-20 ENCOUNTER — OFFICE VISIT (OUTPATIENT)
Dept: CARDIOLOGY CLINIC | Age: 75
End: 2025-03-20
Payer: MEDICARE

## 2025-03-20 VITALS
BODY MASS INDEX: 48.9 KG/M2 | OXYGEN SATURATION: 97 % | HEIGHT: 61 IN | DIASTOLIC BLOOD PRESSURE: 66 MMHG | SYSTOLIC BLOOD PRESSURE: 108 MMHG | WEIGHT: 259 LBS | HEART RATE: 86 BPM

## 2025-03-20 DIAGNOSIS — I50.32 CHRONIC DIASTOLIC CONGESTIVE HEART FAILURE (HCC): Primary | ICD-10-CM

## 2025-03-20 DIAGNOSIS — E66.01 MORBID OBESITY: ICD-10-CM

## 2025-03-20 DIAGNOSIS — I25.10 CORONARY ARTERY DISEASE DUE TO CALCIFIED CORONARY LESION: ICD-10-CM

## 2025-03-20 DIAGNOSIS — I25.84 CORONARY ARTERY DISEASE DUE TO CALCIFIED CORONARY LESION: ICD-10-CM

## 2025-03-20 DIAGNOSIS — G47.33 OSA (OBSTRUCTIVE SLEEP APNEA): ICD-10-CM

## 2025-03-20 PROCEDURE — 1036F TOBACCO NON-USER: CPT | Performed by: CLINICAL NURSE SPECIALIST

## 2025-03-20 PROCEDURE — G8417 CALC BMI ABV UP PARAM F/U: HCPCS | Performed by: CLINICAL NURSE SPECIALIST

## 2025-03-20 PROCEDURE — 1159F MED LIST DOCD IN RCRD: CPT | Performed by: CLINICAL NURSE SPECIALIST

## 2025-03-20 PROCEDURE — G8427 DOCREV CUR MEDS BY ELIG CLIN: HCPCS | Performed by: CLINICAL NURSE SPECIALIST

## 2025-03-20 PROCEDURE — 3017F COLORECTAL CA SCREEN DOC REV: CPT | Performed by: CLINICAL NURSE SPECIALIST

## 2025-03-20 PROCEDURE — G8399 PT W/DXA RESULTS DOCUMENT: HCPCS | Performed by: CLINICAL NURSE SPECIALIST

## 2025-03-20 PROCEDURE — 3078F DIAST BP <80 MM HG: CPT | Performed by: CLINICAL NURSE SPECIALIST

## 2025-03-20 PROCEDURE — 99214 OFFICE O/P EST MOD 30 MIN: CPT | Performed by: CLINICAL NURSE SPECIALIST

## 2025-03-20 PROCEDURE — 1123F ACP DISCUSS/DSCN MKR DOCD: CPT | Performed by: CLINICAL NURSE SPECIALIST

## 2025-03-20 PROCEDURE — 1160F RVW MEDS BY RX/DR IN RCRD: CPT | Performed by: CLINICAL NURSE SPECIALIST

## 2025-03-20 PROCEDURE — 3074F SYST BP LT 130 MM HG: CPT | Performed by: CLINICAL NURSE SPECIALIST

## 2025-03-20 PROCEDURE — 1090F PRES/ABSN URINE INCON ASSESS: CPT | Performed by: CLINICAL NURSE SPECIALIST

## 2025-03-20 RX ORDER — SEMAGLUTIDE 1.34 MG/ML
INJECTION, SOLUTION SUBCUTANEOUS WEEKLY
COMMUNITY
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
## 2025-03-20 NOTE — PATIENT INSTRUCTIONS
Schedule an echo and lexiscan  Let me know who is prescribing the plavix and why you are on it  Ask Dr Melo to fax what procedure you are having done and questions for us  Continue all current medications  Get the sleep study done  Try sugar free lemon drops or ice chips  RTO in 4 months or sooner if clearance needed and will need Dr Stone

## 2025-04-03 RX ORDER — CARVEDILOL 6.25 MG/1
6.25 TABLET ORAL 2 TIMES DAILY
Qty: 60 TABLET | Refills: 0 | Status: SHIPPED | OUTPATIENT
Start: 2025-04-03

## 2025-04-15 ENCOUNTER — TELEPHONE (OUTPATIENT)
Dept: ENDOCRINOLOGY | Age: 75
End: 2025-04-15

## 2025-04-15 NOTE — TELEPHONE ENCOUNTER
Call from pt stating that she is having a difficult time managing her BG   Pt stated that she has been to the ER 2x this past week due to BG being in the 500's     Pt stated that her . She stated that she hasn't ate anything yet. She stated that she just woke up     Pt stated that she is very concerned and believes her BG is going to be in the 400's very soon today     Pt is wanting to know what Dr. Faustin would suggest she do to manage her BG?    Please advise   CB# [phone]

## 2025-04-15 NOTE — TELEPHONE ENCOUNTER
Spoke to the patient. She is currently taking Ozempic 0.5 mg weekly, Tresiba 50 units daily, Humalog 30 units with each meal 2-3 times a day, Jardiance 25 mg daily. Moved next appointment up to next week..     Routing to MD on call.

## 2025-04-15 NOTE — TELEPHONE ENCOUNTER
Hello  Tried calling the patient with no response.  Can you please let her know that she can increase her Tresiba to 55 units daily.  And tell her to take her Humalog at least 15 minutes before each meal and consistently.  If she has any concerns or high blood sugars then she needs to go to ER for further evaluation to rule out any underlying infection  Thank you  Leanne Decker MD

## 2025-04-15 NOTE — TELEPHONE ENCOUNTER
Patient informed. Will increase Tresiba and make sure she is taking Humalog 15 minutes before eating. Will check back in on Thursday if readings are still concerning, otherwise will follow up in office next week.

## 2025-04-24 ENCOUNTER — OFFICE VISIT (OUTPATIENT)
Dept: ENDOCRINOLOGY | Age: 75
End: 2025-04-24
Payer: MEDICARE

## 2025-04-24 VITALS
WEIGHT: 255.8 LBS | TEMPERATURE: 98 F | OXYGEN SATURATION: 98 % | HEART RATE: 74 BPM | BODY MASS INDEX: 48.3 KG/M2 | SYSTOLIC BLOOD PRESSURE: 110 MMHG | RESPIRATION RATE: 16 BRPM | HEIGHT: 61 IN | DIASTOLIC BLOOD PRESSURE: 72 MMHG

## 2025-04-24 DIAGNOSIS — I50.9 ACUTE ON CHRONIC CONGESTIVE HEART FAILURE, UNSPECIFIED HEART FAILURE TYPE (HCC): ICD-10-CM

## 2025-04-24 DIAGNOSIS — I50.43 CHF (CONGESTIVE HEART FAILURE), NYHA CLASS I, ACUTE ON CHRONIC, COMBINED (HCC): ICD-10-CM

## 2025-04-24 DIAGNOSIS — J44.1 COPD EXACERBATION (HCC): ICD-10-CM

## 2025-04-24 DIAGNOSIS — E11.8 POORLY CONTROLLED TYPE 2 DIABETES MELLITUS WITH COMPLICATION (HCC): Primary | ICD-10-CM

## 2025-04-24 DIAGNOSIS — I10 PRIMARY HYPERTENSION: ICD-10-CM

## 2025-04-24 DIAGNOSIS — I25.10 CAD IN NATIVE ARTERY: ICD-10-CM

## 2025-04-24 DIAGNOSIS — E11.65 POORLY CONTROLLED TYPE 2 DIABETES MELLITUS WITH COMPLICATION (HCC): Primary | ICD-10-CM

## 2025-04-24 LAB — HBA1C MFR BLD: 7.5 %

## 2025-04-24 PROCEDURE — 83036 HEMOGLOBIN GLYCOSYLATED A1C: CPT | Performed by: INTERNAL MEDICINE

## 2025-04-24 PROCEDURE — 99214 OFFICE O/P EST MOD 30 MIN: CPT | Performed by: INTERNAL MEDICINE

## 2025-04-24 PROCEDURE — G2211 COMPLEX E/M VISIT ADD ON: HCPCS | Performed by: INTERNAL MEDICINE

## 2025-04-24 PROCEDURE — 1159F MED LIST DOCD IN RCRD: CPT | Performed by: INTERNAL MEDICINE

## 2025-04-24 PROCEDURE — 1160F RVW MEDS BY RX/DR IN RCRD: CPT | Performed by: INTERNAL MEDICINE

## 2025-04-24 PROCEDURE — 3023F SPIROM DOC REV: CPT | Performed by: INTERNAL MEDICINE

## 2025-04-24 PROCEDURE — 1090F PRES/ABSN URINE INCON ASSESS: CPT | Performed by: INTERNAL MEDICINE

## 2025-04-24 PROCEDURE — 1036F TOBACCO NON-USER: CPT | Performed by: INTERNAL MEDICINE

## 2025-04-24 PROCEDURE — 1123F ACP DISCUSS/DSCN MKR DOCD: CPT | Performed by: INTERNAL MEDICINE

## 2025-04-24 PROCEDURE — 3074F SYST BP LT 130 MM HG: CPT | Performed by: INTERNAL MEDICINE

## 2025-04-24 PROCEDURE — 3017F COLORECTAL CA SCREEN DOC REV: CPT | Performed by: INTERNAL MEDICINE

## 2025-04-24 PROCEDURE — 2022F DILAT RTA XM EVC RTNOPTHY: CPT | Performed by: INTERNAL MEDICINE

## 2025-04-24 PROCEDURE — 3051F HG A1C>EQUAL 7.0%<8.0%: CPT | Performed by: INTERNAL MEDICINE

## 2025-04-24 PROCEDURE — 3078F DIAST BP <80 MM HG: CPT | Performed by: INTERNAL MEDICINE

## 2025-04-24 PROCEDURE — G8399 PT W/DXA RESULTS DOCUMENT: HCPCS | Performed by: INTERNAL MEDICINE

## 2025-04-24 PROCEDURE — G8427 DOCREV CUR MEDS BY ELIG CLIN: HCPCS | Performed by: INTERNAL MEDICINE

## 2025-04-24 PROCEDURE — G8417 CALC BMI ABV UP PARAM F/U: HCPCS | Performed by: INTERNAL MEDICINE

## 2025-04-24 RX ORDER — PEN NEEDLE, DIABETIC 30 GX3/16"
NEEDLE, DISPOSABLE MISCELLANEOUS
Qty: 200 EACH | Refills: 5 | Status: SHIPPED | OUTPATIENT
Start: 2025-04-24

## 2025-04-24 RX ORDER — AVOBENZONE, HOMOSALATE, OCTISALATE, OCTOCRYLENE 30; 40; 45; 26 MG/ML; MG/ML; MG/ML; MG/ML
CREAM TOPICAL
Qty: 200 EACH | Refills: 5 | Status: SHIPPED | OUTPATIENT
Start: 2025-04-24

## 2025-04-24 NOTE — PATIENT INSTRUCTIONS
Increase Tresiba to 60 units twice a day   Increase Ozempic 1 mg weekly   If blood sugars start getting below 90 call our office     Patient Education        Learning About Carbohydrate (Carb) Counting and Eating Out When You Have Diabetes  Why plan your meals?     Meal planning can be a key part of managing diabetes. Planning meals and snacks with the right balance of carbohydrate, protein, and fat can help you keep your blood sugar at the target level you set with your doctor.  You don't have to eat special foods. You can eat what your family eats, including sweets once in a while. But you do have to pay attention to how often you eat and how much you eat of certain foods.  You may want to work with a dietitian or a diabetes educator. They can give you tips and meal ideas and can answer your questions about meal planning. This health professional can also help you reach a healthy weight if that is one of your goals.  What should you know about eating carbs?  Managing the amount of carbohydrate (carbs) you eat is an important part of healthy meals when you have diabetes. Carbohydrate is found in many foods.  Learn which foods have carbs. And learn the amounts of carbs in different foods.  Bread, cereal, pasta, and rice have about 15 grams of carbs in a serving. A serving is 1 slice of bread (1 ounce), ½ cup of cooked cereal, or 1/3 cup of cooked pasta or rice.  Fruits have 15 grams of carbs in a serving. A serving is 1 small fresh fruit, such as an apple or orange; ½ of a banana; ½ cup of cooked or canned fruit; ½ cup of fruit juice; 1 cup of melon or raspberries; or 2 tablespoons of dried fruit.  Milk and no-sugar-added yogurt have 15 grams of carbs in a serving. A serving is 1 cup of milk or 3/4 cup (6 oz) of no-sugar-added yogurt.  Starchy vegetables have 15 grams of carbs in a serving. A serving is ½ cup of mashed potatoes or sweet potato; 1 cup winter squash; ½ of a small baked potato; ½ cup of cooked beans;

## 2025-04-24 NOTE — PROGRESS NOTES
Brandi Gomez is a 74 y.o. female is seen for management of uncontrolled  Type 2 diabetes. Brandi Gomez was diagnosed with Diabetes mellitus in 2000s .  Diabetes was diagnosed at routine screening .  Brandi Gomez got diabetic education in the past.  Comorbid conditions: Neuropathy, Nephropathy, Retinopathy, Chronic Kidney Disease, and Coronary Artery Disease  Patient was initially started on metformin   Patient has tried the following medications for diabetes in the past metformin stopped due to issues with kidneys according to the patient    Patient also has comorbidities that include CHF , CAD s/p stent in 2023  and follows with Cardiology she also has COPD     INTERIM:     Now takes   Tresiba 50  units BID ,   Mounjaro 10 mg weekly  was stopped in march 2025   She was switched to Ozempic and now takes 0.5 mg weekly '  she  lost 8 lbs since last visit   She got steroid injection for her back in jan 2025  , fingerstick blood glucose running high         Past Medical History:   Diagnosis Date    Asthma     Back pain     DJD    Bipolar 1 disorder (HCC)     CAD (coronary artery disease)     Cancer (HCC)     Left breast CA    Cataract     CHF (congestive heart failure) (HCC)     HFpEF    Cirrhosis (HCC)     COPD (chronic obstructive pulmonary disease) (HCC)     Depression     Diabetes mellitus (HCC)     GERD (gastroesophageal reflux disease)     Hx of blood clots     PE 06/02/2018    Hypercholesteremia     Hyperlipidemia     Hypertension     Morbid obesity with body mass index (BMI) greater than or equal to 50 (HCC)     Osteoporosis     Pancytopenia (HCC)     Right rib fracture 10/20/2022    RSV bronchitis     Sciatica     Tachycardia       Patient Active Problem List   Diagnosis    CHF (congestive heart failure), NYHA class I, acute on chronic, combined (HCC)    Acute on chronic congestive heart failure (HCC)    Acute on chronic respiratory failure with hypoxia (HCC)    Pancytopenia (HCC)    Iron

## 2025-05-07 ENCOUNTER — TELEPHONE (OUTPATIENT)
Dept: CARDIOLOGY CLINIC | Age: 75
End: 2025-05-07

## 2025-05-07 RX ORDER — FUROSEMIDE 20 MG/1
30 TABLET ORAL DAILY
Qty: 90 TABLET | Refills: 0 | Status: SHIPPED | OUTPATIENT
Start: 2025-05-07

## 2025-05-07 RX ORDER — SPIRONOLACTONE 25 MG/1
25 TABLET ORAL DAILY
Qty: 90 TABLET | Refills: 0 | Status: SHIPPED | OUTPATIENT
Start: 2025-05-07

## 2025-05-07 NOTE — TELEPHONE ENCOUNTER
Pt req Refill Medications listed below:    spironolactone   Take 1 tablet by mouth daily     furosemide   Take 1.5 tablets by mouth daily Take 1.5 tablets every day.     [facility] [id number] - [place], [place] - [address] -  [phone] - F [phone] [address] [address]  Phone: [phone]  Fax: [phone]

## 2025-05-07 NOTE — TELEPHONE ENCOUNTER
Received refill request for Spirolactone from [facility].     Last OV: 3/20/25    Next OV: 7/21/25    Last Labs: bmp 2/7/25    Last Filled: 2/10/25      Received refill request for Furosemide from [facility].     Last OV: 3/20/25    Next OV: 7/21/25    Last Labs: bmp 2/7/25    Last Filled: 3/7/24

## 2025-05-16 RX ORDER — SEMAGLUTIDE 1.34 MG/ML
1 INJECTION, SOLUTION SUBCUTANEOUS
Qty: 9 ML | Refills: 5 | Status: SHIPPED | OUTPATIENT
Start: 2025-05-16

## 2025-05-20 ENCOUNTER — TELEPHONE (OUTPATIENT)
Dept: PULMONOLOGY | Age: 75
End: 2025-05-20

## 2025-05-20 NOTE — TELEPHONE ENCOUNTER
Called to let the patient know that Dr Kennedy switched her from spiriva to stiolto her last appt. An order will be sent to [facility] for Stiolto

## 2025-05-20 NOTE — TELEPHONE ENCOUNTER
Pt called and would like a refill for   tiotropium-olodaterol (STIOLTO RESPIMAT) 2.5-2.5 MCG/ACT AERS  and would like to  know if she should use Spiriva as well.

## 2025-07-03 ENCOUNTER — TELEPHONE (OUTPATIENT)
Dept: CARDIOLOGY CLINIC | Age: 75
End: 2025-07-03

## 2025-07-03 NOTE — TELEPHONE ENCOUNTER
Patient has 2 different dosages of furosemide 40 mg and 20 mg, asking which one she is suppose to be taking. Please advise.

## 2025-07-11 DIAGNOSIS — E11.65 POORLY CONTROLLED TYPE 2 DIABETES MELLITUS WITH COMPLICATION (HCC): ICD-10-CM

## 2025-07-11 DIAGNOSIS — E11.8 POORLY CONTROLLED TYPE 2 DIABETES MELLITUS WITH COMPLICATION (HCC): ICD-10-CM

## 2025-07-11 RX ORDER — EMPAGLIFLOZIN 25 MG/1
25 TABLET, FILM COATED ORAL DAILY
Qty: 90 TABLET | Refills: 3 | Status: SHIPPED | OUTPATIENT
Start: 2025-07-11

## 2025-07-16 ENCOUNTER — OFFICE VISIT (OUTPATIENT)
Dept: CARDIOLOGY CLINIC | Age: 75
End: 2025-07-16
Payer: MEDICARE

## 2025-07-16 ENCOUNTER — HOSPITAL ENCOUNTER (OUTPATIENT)
Age: 75
Discharge: HOME | End: 2025-07-16
Payer: MEDICARE

## 2025-07-16 VITALS
HEIGHT: 61 IN | DIASTOLIC BLOOD PRESSURE: 60 MMHG | BODY MASS INDEX: 48.52 KG/M2 | WEIGHT: 257 LBS | HEART RATE: 95 BPM | SYSTOLIC BLOOD PRESSURE: 90 MMHG | OXYGEN SATURATION: 93 %

## 2025-07-16 DIAGNOSIS — E66.01 MORBID OBESITY (HCC): ICD-10-CM

## 2025-07-16 DIAGNOSIS — I50.32 CHRONIC DIASTOLIC CONGESTIVE HEART FAILURE (HCC): ICD-10-CM

## 2025-07-16 DIAGNOSIS — I25.10 CORONARY ARTERY DISEASE DUE TO CALCIFIED CORONARY LESION: ICD-10-CM

## 2025-07-16 DIAGNOSIS — I25.84 CORONARY ARTERY DISEASE DUE TO CALCIFIED CORONARY LESION: ICD-10-CM

## 2025-07-16 DIAGNOSIS — G47.33 OSA (OBSTRUCTIVE SLEEP APNEA): ICD-10-CM

## 2025-07-16 DIAGNOSIS — I50.32 CHRONIC DIASTOLIC CONGESTIVE HEART FAILURE (HCC): Primary | ICD-10-CM

## 2025-07-16 LAB
ANION GAP SERPL CALCULATED.3IONS-SCNC: 15 MMOL/L (ref 3–16)
BUN SERPL-MCNC: 13 MG/DL (ref 7–20)
CALCIUM SERPL-MCNC: 9.6 MG/DL (ref 8.3–10.6)
CHLORIDE SERPL-SCNC: 96 MMOL/L (ref 99–110)
CO2 SERPL-SCNC: 26 MMOL/L (ref 21–32)
CREAT SERPL-MCNC: 0.8 MG/DL (ref 0.6–1.2)
GFR SERPLBLD CREATININE-BSD FMLA CKD-EPI: 78 ML/MIN/{1.73_M2}
GLUCOSE SERPL-MCNC: 298 MG/DL (ref 70–99)
NT-PROBNP SERPL-MCNC: <36 PG/ML (ref 0–124)
POTASSIUM SERPL-SCNC: 4.3 MMOL/L (ref 3.5–5.1)
SODIUM SERPL-SCNC: 137 MMOL/L (ref 136–145)

## 2025-07-16 PROCEDURE — 36415 COLL VENOUS BLD VENIPUNCTURE: CPT

## 2025-07-16 PROCEDURE — 80048 BASIC METABOLIC PNL TOTAL CA: CPT

## 2025-07-16 PROCEDURE — G8417 CALC BMI ABV UP PARAM F/U: HCPCS | Performed by: CLINICAL NURSE SPECIALIST

## 2025-07-16 PROCEDURE — 99214 OFFICE O/P EST MOD 30 MIN: CPT | Performed by: CLINICAL NURSE SPECIALIST

## 2025-07-16 PROCEDURE — G8427 DOCREV CUR MEDS BY ELIG CLIN: HCPCS | Performed by: CLINICAL NURSE SPECIALIST

## 2025-07-16 PROCEDURE — 1123F ACP DISCUSS/DSCN MKR DOCD: CPT | Performed by: CLINICAL NURSE SPECIALIST

## 2025-07-16 PROCEDURE — 3017F COLORECTAL CA SCREEN DOC REV: CPT | Performed by: CLINICAL NURSE SPECIALIST

## 2025-07-16 PROCEDURE — 1159F MED LIST DOCD IN RCRD: CPT | Performed by: CLINICAL NURSE SPECIALIST

## 2025-07-16 PROCEDURE — 3074F SYST BP LT 130 MM HG: CPT | Performed by: CLINICAL NURSE SPECIALIST

## 2025-07-16 PROCEDURE — 1090F PRES/ABSN URINE INCON ASSESS: CPT | Performed by: CLINICAL NURSE SPECIALIST

## 2025-07-16 PROCEDURE — 83880 ASSAY OF NATRIURETIC PEPTIDE: CPT

## 2025-07-16 PROCEDURE — 1036F TOBACCO NON-USER: CPT | Performed by: CLINICAL NURSE SPECIALIST

## 2025-07-16 PROCEDURE — 1160F RVW MEDS BY RX/DR IN RCRD: CPT | Performed by: CLINICAL NURSE SPECIALIST

## 2025-07-16 PROCEDURE — 3078F DIAST BP <80 MM HG: CPT | Performed by: CLINICAL NURSE SPECIALIST

## 2025-07-16 PROCEDURE — G8399 PT W/DXA RESULTS DOCUMENT: HCPCS | Performed by: CLINICAL NURSE SPECIALIST

## 2025-07-16 RX ORDER — INSULIN DEGLUDEC 100 U/ML
60 INJECTION, SOLUTION SUBCUTANEOUS 2 TIMES DAILY
COMMUNITY

## 2025-07-16 RX ORDER — METOPROLOL SUCCINATE 25 MG/1
25 TABLET, EXTENDED RELEASE ORAL DAILY
Qty: 90 TABLET | Refills: 2 | Status: SHIPPED | OUTPATIENT
Start: 2025-07-16

## 2025-07-16 RX ORDER — SPIRONOLACTONE 25 MG/1
25 TABLET ORAL DAILY
Qty: 90 TABLET | Refills: 1 | Status: SHIPPED | OUTPATIENT
Start: 2025-07-16

## 2025-07-16 RX ORDER — ACETAMINOPHEN 160 MG
2000 TABLET,DISINTEGRATING ORAL DAILY
COMMUNITY

## 2025-07-16 RX ORDER — FUROSEMIDE 20 MG/1
30 TABLET ORAL DAILY
Qty: 90 TABLET | Refills: 1 | Status: SHIPPED | OUTPATIENT
Start: 2025-07-16

## 2025-07-16 NOTE — PATIENT INSTRUCTIONS
Blood work today  Stop the coreg and change to metoprolol 25 mg once a day  Continue all other medications  Refills done  Let me know if you are going to have surgery on your shoulder  RTO in 6 months

## 2025-07-16 NOTE — PROGRESS NOTES
17 02/07/2025 01:52 PM    BUN 17 09/04/2024 02:43 PM    BUN 14 03/07/2024 04:20 PM    CREATININE 0.8 02/07/2025 01:52 PM    CREATININE 0.7 09/04/2024 02:43 PM    CREATININE 0.7 03/07/2024 04:20 PM     BNP:   Lab Results   Component Value Date/Time    PROBNP <36 02/07/2025 01:52 PM    PROBNP <36 03/07/2024 04:20 PM    PROBNP 76 02/15/2024 07:58 PM    PROBNP 80 02/10/2024 01:20 PM     Cardiac Imaging:  [facility] Dr Soto 6/14/2023  Cardiac Cath PCI:  Anatomy:   LM-distal 75%   LAD-mid 40%  Cx-prox 20%  OM- nml  RCA-dom prox 40%  RPDA- nml  LVEF- 55%  LVG- nml  LVEDP- 18     IVUS of LM shows MLA 5.6mm2     Intervention  ~Successful PCI to LM with 4.5x12 SONYA. PD with 5.0x12 NC to 18atm. 10% residual LM stenosis IVUS of LM post PCI shows MLA of 16.6mm2     Contrast: 106  Flouro Time: 7.9  Access: R radial a     Impression  ~Coronary Angiography w/ severe LM stenosis  ~LVG with LVEF of 55 and no regional wall motion abnormalities  ~Successful complex angioplasty and stenting of LM       Echo:  3/22/23:   Summary   Technically difficult exam.   Left ventricular cavity size is dilated with normal left ventricular wall   thickness.   Overall left ventricular systolic function appears normal with an ejection   fraction that is visually estimated to be 55-60%.   No obvious regional wall motion abnormalities are noted.   Definity imaging agent administered to better visualize the endocardial   border.   Grade II diastolic dysfunction with elevated LV filling pressures. Avg E/e'   estimated to be 16.5.   The left atrium is mildly dilated    Assessment:    1. Chronic diastolic heart failure (HCC) no ace/arb/arni due to soft bp   2. CAD status post PCI to LM 6/14/2023   3. Morbid obesity (HCC)    4. Anemia Dr Colindres   5. ALEXANDRU (obstructive sleep apnea) Dr Kennedy       Plan:   Patient Instructions   Blood work today  Stop the coreg and change to metoprolol 25 mg once a day  Continue all other medications  Refills done  Let me know if

## 2025-07-17 ENCOUNTER — RESULTS FOLLOW-UP (OUTPATIENT)
Dept: CARDIOLOGY CLINIC | Age: 75
End: 2025-07-17

## 2025-07-17 NOTE — TELEPHONE ENCOUNTER
----- Message from SONDRA Augustin - CNS sent at 7/17/2025  8:43 AM EDT -----  Her blood work is stable but blood sugar was elevated  Continue all current heart medications  thanks

## 2025-08-07 ENCOUNTER — HOSPITAL ENCOUNTER (OUTPATIENT)
Age: 75
Discharge: HOME | End: 2025-08-09
Payer: MEDICARE

## 2025-08-07 VITALS
BODY MASS INDEX: 51.73 KG/M2 | HEIGHT: 61 IN | DIASTOLIC BLOOD PRESSURE: 73 MMHG | WEIGHT: 274 LBS | SYSTOLIC BLOOD PRESSURE: 151 MMHG

## 2025-08-07 DIAGNOSIS — I50.32 CHRONIC DIASTOLIC CONGESTIVE HEART FAILURE (HCC): ICD-10-CM

## 2025-08-07 DIAGNOSIS — I25.10 CORONARY ARTERY DISEASE DUE TO CALCIFIED CORONARY LESION: ICD-10-CM

## 2025-08-07 DIAGNOSIS — I25.84 CORONARY ARTERY DISEASE DUE TO CALCIFIED CORONARY LESION: ICD-10-CM

## 2025-08-07 LAB
ECHO AO ASC DIAM: 3.6 CM
ECHO AO ASCENDING AORTA INDEX: 1.67 CM/M2
ECHO AO ROOT DIAM: 3.3 CM
ECHO AO ROOT INDEX: 1.53 CM/M2
ECHO AV AREA PEAK VELOCITY: 1.3 CM2
ECHO AV AREA VTI: 1.4 CM2
ECHO AV AREA/BSA PEAK VELOCITY: 0.6 CM2/M2
ECHO AV AREA/BSA VTI: 0.6 CM2/M2
ECHO AV MEAN GRADIENT: 7 MMHG
ECHO AV MEAN VELOCITY: 1.3 M/S
ECHO AV PEAK GRADIENT: 12 MMHG
ECHO AV PEAK VELOCITY: 1.7 M/S
ECHO AV VELOCITY RATIO: 0.59
ECHO AV VTI: 28.4 CM
ECHO BSA: 2.31 M2
ECHO BSA: 2.31 M2
ECHO LA AREA 2C: 17.4 CM2
ECHO LA AREA 4C: 21.7 CM2
ECHO LA MAJOR AXIS: 5.7 CM
ECHO LA MINOR AXIS: 5.1 CM
ECHO LA VOL BP: 57 ML (ref 22–52)
ECHO LA VOL MOD A2C: 47 ML (ref 22–52)
ECHO LA VOL MOD A4C: 63 ML (ref 22–52)
ECHO LA VOL/BSA BIPLANE: 26 ML/M2 (ref 16–34)
ECHO LA VOLUME INDEX MOD A2C: 22 ML/M2 (ref 16–34)
ECHO LA VOLUME INDEX MOD A4C: 29 ML/M2 (ref 16–34)
ECHO LV E' LATERAL VELOCITY: 9.33 CM/S
ECHO LV E' SEPTAL VELOCITY: 6.85 CM/S
ECHO LV EDV A2C: 95 ML
ECHO LV EDV A4C: 101 ML
ECHO LV EDV INDEX A4C: 47 ML/M2
ECHO LV EDV NDEX A2C: 44 ML/M2
ECHO LV EF PHYSICIAN: 55 %
ECHO LV EJECTION FRACTION A2C: 59 %
ECHO LV EJECTION FRACTION A4C: 57 %
ECHO LV EJECTION FRACTION BIPLANE: 57 % (ref 55–100)
ECHO LV ESV A2C: 39 ML
ECHO LV ESV A4C: 44 ML
ECHO LV ESV INDEX A2C: 18 ML/M2
ECHO LV ESV INDEX A4C: 20 ML/M2
ECHO LV FRACTIONAL SHORTENING: 36 % (ref 28–44)
ECHO LV INTERNAL DIMENSION DIASTOLE INDEX: 1.94 CM/M2
ECHO LV INTERNAL DIMENSION DIASTOLIC: 4.2 CM (ref 3.9–5.3)
ECHO LV INTERNAL DIMENSION SYSTOLIC INDEX: 1.25 CM/M2
ECHO LV INTERNAL DIMENSION SYSTOLIC: 2.7 CM
ECHO LV IVSD: 1.1 CM (ref 0.6–0.9)
ECHO LV MASS 2D: 147 G (ref 67–162)
ECHO LV MASS INDEX 2D: 68.1 G/M2 (ref 43–95)
ECHO LV POSTERIOR WALL DIASTOLIC: 1 CM (ref 0.6–0.9)
ECHO LV RELATIVE WALL THICKNESS RATIO: 0.48
ECHO LVOT AREA: 2.3 CM2
ECHO LVOT AV VTI INDEX: 0.6
ECHO LVOT DIAM: 1.7 CM
ECHO LVOT MEAN GRADIENT: 2 MMHG
ECHO LVOT PEAK GRADIENT: 4 MMHG
ECHO LVOT PEAK VELOCITY: 1 M/S
ECHO LVOT STROKE VOLUME INDEX: 17.8 ML/M2
ECHO LVOT SV: 38.3 ML
ECHO LVOT VTI: 16.9 CM
ECHO MV A VELOCITY: 0.88 M/S
ECHO MV E VELOCITY: 0.53 M/S
ECHO MV E/A RATIO: 0.6
ECHO MV E/E' LATERAL: 5.68
ECHO MV E/E' RATIO (AVERAGED): 6.71
ECHO MV E/E' SEPTAL: 7.74
ECHO PV MAX VELOCITY: 1.5 M/S
ECHO PV PEAK GRADIENT: 9 MMHG
ECHO RA AREA 4C: 11.8 CM2
ECHO RA END SYSTOLIC VOLUME APICAL 4 CHAMBER INDEX BSA: 12 ML/M2
ECHO RA VOLUME: 26 ML
ECHO RV BASAL DIMENSION: 4.3 CM
ECHO RV FREE WALL PEAK S': 11.3 CM/S
ECHO RV LONGITUDINAL DIMENSION: 6.7 CM
ECHO RV MID DIMENSION: 2.9 CM
ECHO RV TAPSE: 1.4 CM (ref 1.7–?)
ECHO TV REGURGITANT MAX VELOCITY: 2.33 M/S
ECHO TV REGURGITANT PEAK GRADIENT: 22 MMHG
NUC STRESS EJECTION FRACTION: 64 %
NUC STRESS LV EDV: 77 ML (ref 56–104)
NUC STRESS LV ESV: 28 ML (ref 19–49)
NUC STRESS LV MASS: 117 G
STRESS BASELINE DIAS BP: 74 MMHG
STRESS BASELINE HR: 100 BPM
STRESS BASELINE SYS BP: 132 MMHG
STRESS ESTIMATED WORKLOAD: 1 METS
STRESS EXERCISE DUR MIN: 4 MIN
STRESS EXERCISE DUR SEC: 0 SEC
STRESS O2 SAT PEAK: 96 %
STRESS O2 SAT REST: 96 %
STRESS PEAK DIAS BP: 63 MMHG
STRESS PEAK SYS BP: 121 MMHG
STRESS PERCENT HR ACHIEVED: 71 %
STRESS POST PEAK HR: 105 BPM
STRESS RATE PRESSURE PRODUCT: NORMAL BPM*MMHG
STRESS TARGET HR: 148 BPM
TID: 0.94

## 2025-08-07 PROCEDURE — 93016 CV STRESS TEST SUPVJ ONLY: CPT | Performed by: INTERNAL MEDICINE

## 2025-08-07 PROCEDURE — A9502 TC99M TETROFOSMIN: HCPCS | Performed by: CLINICAL NURSE SPECIALIST

## 2025-08-07 PROCEDURE — 3430000000 HC RX DIAGNOSTIC RADIOPHARMACEUTICAL: Performed by: CLINICAL NURSE SPECIALIST

## 2025-08-07 PROCEDURE — 6360000002 HC RX W HCPCS: Performed by: CLINICAL NURSE SPECIALIST

## 2025-08-07 PROCEDURE — 6360000004 HC RX CONTRAST MEDICATION: Performed by: CLINICAL NURSE SPECIALIST

## 2025-08-07 PROCEDURE — 93018 CV STRESS TEST I&R ONLY: CPT | Performed by: INTERNAL MEDICINE

## 2025-08-07 PROCEDURE — 78452 HT MUSCLE IMAGE SPECT MULT: CPT

## 2025-08-07 PROCEDURE — 93306 TTE W/DOPPLER COMPLETE: CPT | Performed by: INTERNAL MEDICINE

## 2025-08-07 PROCEDURE — C8929 TTE W OR WO FOL WCON,DOPPLER: HCPCS

## 2025-08-07 PROCEDURE — 93017 CV STRESS TEST TRACING ONLY: CPT

## 2025-08-07 PROCEDURE — 78452 HT MUSCLE IMAGE SPECT MULT: CPT | Performed by: INTERNAL MEDICINE

## 2025-08-07 RX ORDER — REGADENOSON 0.08 MG/ML
0.4 INJECTION, SOLUTION INTRAVENOUS
Status: COMPLETED | OUTPATIENT
Start: 2025-08-07 | End: 2025-08-07

## 2025-08-07 RX ADMIN — TETROFOSMIN 10.9 MILLICURIE: 1.38 INJECTION, POWDER, LYOPHILIZED, FOR SOLUTION INTRAVENOUS at 12:27

## 2025-08-07 RX ADMIN — TETROFOSMIN 33.5 MILLICURIE: 1.38 INJECTION, POWDER, LYOPHILIZED, FOR SOLUTION INTRAVENOUS at 13:30

## 2025-08-07 RX ADMIN — REGADENOSON 0.4 MG: 0.08 INJECTION, SOLUTION INTRAVENOUS at 13:35

## 2025-08-07 RX ADMIN — SULFUR HEXAFLUORIDE 2 ML: 60.7; .19; .19 INJECTION, POWDER, LYOPHILIZED, FOR SUSPENSION INTRAVENOUS; INTRAVESICAL at 12:17

## 2025-08-08 ENCOUNTER — OFFICE VISIT (OUTPATIENT)
Dept: ENT CLINIC | Age: 75
End: 2025-08-08

## 2025-08-08 VITALS
SYSTOLIC BLOOD PRESSURE: 119 MMHG | DIASTOLIC BLOOD PRESSURE: 70 MMHG | TEMPERATURE: 98.6 F | OXYGEN SATURATION: 93 % | WEIGHT: 253 LBS | BODY MASS INDEX: 47.77 KG/M2 | HEIGHT: 61 IN | HEART RATE: 109 BPM

## 2025-08-08 DIAGNOSIS — G47.30 SLEEP-DISORDERED BREATHING: ICD-10-CM

## 2025-08-08 DIAGNOSIS — H61.21 IMPACTED CERUMEN OF RIGHT EAR: Primary | ICD-10-CM

## 2025-08-12 ENCOUNTER — TELEPHONE (OUTPATIENT)
Dept: CARDIOLOGY CLINIC | Age: 75
End: 2025-08-12

## 2025-08-16 PROBLEM — H61.21 IMPACTED CERUMEN OF RIGHT EAR: Status: ACTIVE | Noted: 2025-08-16

## 2025-08-16 PROBLEM — G47.30 SLEEP-DISORDERED BREATHING: Status: ACTIVE | Noted: 2025-08-16

## 2025-08-28 ENCOUNTER — OFFICE VISIT (OUTPATIENT)
Dept: ENDOCRINOLOGY | Age: 75
End: 2025-08-28
Payer: MEDICARE

## 2025-08-28 VITALS
WEIGHT: 250 LBS | HEIGHT: 61 IN | TEMPERATURE: 98 F | RESPIRATION RATE: 16 BRPM | BODY MASS INDEX: 47.2 KG/M2 | OXYGEN SATURATION: 98 %

## 2025-08-28 DIAGNOSIS — E11.65 POORLY CONTROLLED TYPE 2 DIABETES MELLITUS WITH COMPLICATION (HCC): Primary | ICD-10-CM

## 2025-08-28 DIAGNOSIS — E11.8 POORLY CONTROLLED TYPE 2 DIABETES MELLITUS WITH COMPLICATION (HCC): Primary | ICD-10-CM

## 2025-08-28 DIAGNOSIS — I10 PRIMARY HYPERTENSION: ICD-10-CM

## 2025-08-28 DIAGNOSIS — E11.8 POORLY CONTROLLED TYPE 2 DIABETES MELLITUS WITH COMPLICATION (HCC): ICD-10-CM

## 2025-08-28 DIAGNOSIS — E11.65 POORLY CONTROLLED TYPE 2 DIABETES MELLITUS WITH COMPLICATION (HCC): ICD-10-CM

## 2025-08-28 DIAGNOSIS — E78.2 MIXED HYPERLIPIDEMIA: ICD-10-CM

## 2025-08-28 PROCEDURE — 1159F MED LIST DOCD IN RCRD: CPT | Performed by: INTERNAL MEDICINE

## 2025-08-28 PROCEDURE — 3017F COLORECTAL CA SCREEN DOC REV: CPT | Performed by: INTERNAL MEDICINE

## 2025-08-28 PROCEDURE — G2211 COMPLEX E/M VISIT ADD ON: HCPCS | Performed by: INTERNAL MEDICINE

## 2025-08-28 PROCEDURE — 2022F DILAT RTA XM EVC RTNOPTHY: CPT | Performed by: INTERNAL MEDICINE

## 2025-08-28 PROCEDURE — 1036F TOBACCO NON-USER: CPT | Performed by: INTERNAL MEDICINE

## 2025-08-28 PROCEDURE — G8417 CALC BMI ABV UP PARAM F/U: HCPCS | Performed by: INTERNAL MEDICINE

## 2025-08-28 PROCEDURE — 1123F ACP DISCUSS/DSCN MKR DOCD: CPT | Performed by: INTERNAL MEDICINE

## 2025-08-28 PROCEDURE — 99214 OFFICE O/P EST MOD 30 MIN: CPT | Performed by: INTERNAL MEDICINE

## 2025-08-28 PROCEDURE — G8427 DOCREV CUR MEDS BY ELIG CLIN: HCPCS | Performed by: INTERNAL MEDICINE

## 2025-08-28 PROCEDURE — 3051F HG A1C>EQUAL 7.0%<8.0%: CPT | Performed by: INTERNAL MEDICINE

## 2025-08-28 PROCEDURE — G8399 PT W/DXA RESULTS DOCUMENT: HCPCS | Performed by: INTERNAL MEDICINE

## 2025-08-28 PROCEDURE — 1090F PRES/ABSN URINE INCON ASSESS: CPT | Performed by: INTERNAL MEDICINE

## 2025-08-28 PROCEDURE — 1160F RVW MEDS BY RX/DR IN RCRD: CPT | Performed by: INTERNAL MEDICINE

## 2025-08-28 RX ORDER — KETOROLAC TROMETHAMINE 30 MG/ML
INJECTION, SOLUTION INTRAMUSCULAR; INTRAVENOUS
Qty: 1 EACH | Refills: 1 | Status: SHIPPED | OUTPATIENT
Start: 2025-08-28

## 2025-08-28 RX ORDER — HYDROCHLOROTHIAZIDE 12.5 MG/1
CAPSULE ORAL
Qty: 2 EACH | Refills: 5 | Status: SHIPPED | OUTPATIENT
Start: 2025-08-28

## (undated) DEVICE — BW-412T DISP COMBO CLEANING BRUSH: Brand: SINGLE USE COMBINATION CLEANING BRUSH

## (undated) DEVICE — AIR/WATER CLEANING ADAPTER FOR OLYMPUS® GI ENDOSCOPE: Brand: BULLDOG®

## (undated) DEVICE — GOWN AURORA NONREINF LG: Brand: MEDLINE INDUSTRIES, INC.

## (undated) DEVICE — FIAPC® PROBE W/ FILTER 2200 C OD 2.3MM/6.9FR; L 2.2M/7.2FT: Brand: ERBE

## (undated) DEVICE — ELECTRODE PT RET AD L9FT HI MOIST COND ADH HYDRGEL CORDED

## (undated) DEVICE — MOUTHPIECE ENDOSCP L CTRL OPN AND SIDE PORTS DISP

## (undated) DEVICE — SOLUTION IV IRRIG WATER 500ML POUR BRL ST 2F7113

## (undated) DEVICE — ENDOSCOPIC KIT 6X3/16 FT COLON W/ 1.1 OZ 2 GWN W/O BRSH

## (undated) DEVICE — VALVE SUCTION AIR H2O SET ORCA POD + DISP

## (undated) DEVICE — REPLAY HEMOSTASIS CLIP, 16MM SPAN: Brand: REPLAY

## (undated) DEVICE — FORCEPS BX L240CM WRK CHN 2.8MM STD CAP W/ NDL MIC MESH